# Patient Record
Sex: MALE | Race: WHITE | NOT HISPANIC OR LATINO | Employment: OTHER | URBAN - METROPOLITAN AREA
[De-identification: names, ages, dates, MRNs, and addresses within clinical notes are randomized per-mention and may not be internally consistent; named-entity substitution may affect disease eponyms.]

---

## 2017-03-10 DIAGNOSIS — M25.562 PAIN IN BOTH KNEES, UNSPECIFIED CHRONICITY: Primary | ICD-10-CM

## 2017-03-10 DIAGNOSIS — M25.561 PAIN IN BOTH KNEES, UNSPECIFIED CHRONICITY: Primary | ICD-10-CM

## 2017-03-13 ENCOUNTER — OFFICE VISIT (OUTPATIENT)
Dept: ORTHOPEDICS | Facility: CLINIC | Age: 57
End: 2017-03-13
Payer: COMMERCIAL

## 2017-03-13 VITALS
DIASTOLIC BLOOD PRESSURE: 75 MMHG | HEART RATE: 78 BPM | SYSTOLIC BLOOD PRESSURE: 138 MMHG | BODY MASS INDEX: 38.51 KG/M2 | HEIGHT: 69 IN | WEIGHT: 260 LBS

## 2017-03-13 DIAGNOSIS — M22.2X1 PATELLOFEMORAL STRESS SYNDROME OF BOTH KNEES: Primary | ICD-10-CM

## 2017-03-13 DIAGNOSIS — M22.2X2 PATELLOFEMORAL STRESS SYNDROME OF BOTH KNEES: Primary | ICD-10-CM

## 2017-03-13 PROCEDURE — 1160F RVW MEDS BY RX/DR IN RCRD: CPT | Mod: S$GLB,,, | Performed by: ORTHOPAEDIC SURGERY

## 2017-03-13 PROCEDURE — 99213 OFFICE O/P EST LOW 20 MIN: CPT | Mod: S$GLB,,, | Performed by: ORTHOPAEDIC SURGERY

## 2017-03-13 NOTE — PROGRESS NOTES
Past Medical History:   Diagnosis Date    Arthritis     Gout        Past Surgical History:   Procedure Laterality Date    SHOULDER ARTHROSCOPY         Current Outpatient Prescriptions   Medication Sig    ibuprofen (ADVIL,MOTRIN) 800 MG tablet Take 1 tablet (800 mg total) by mouth 3 (three) times daily.    meloxicam (MOBIC) 7.5 MG tablet Take 1 tablet (7.5 mg total) by mouth once daily.    meloxicam (MOBIC) 7.5 MG tablet Take 1 tablet (7.5 mg total) by mouth once daily.    oxycodone-acetaminophen (PERCOCET)  mg per tablet Take 1 tablet by mouth every 6 (six) hours.    oxycodone-acetaminophen (PERCOCET)  mg per tablet Take 1 tablet by mouth every 6 (six) hours.     No current facility-administered medications for this visit.        Review of patient's allergies indicates:   Allergen Reactions    Pcn [penicillins]        History reviewed. No pertinent family history.    Social History     Social History    Marital status:      Spouse name: N/A    Number of children: N/A    Years of education: N/A     Occupational History    Not on file.     Social History Main Topics    Smoking status: Current Every Day Smoker     Packs/day: 5.00    Smokeless tobacco: Never Used    Alcohol use No    Drug use: No    Sexual activity: Not on file     Other Topics Concern    Not on file     Social History Narrative       Chief Complaint:   Chief Complaint   Patient presents with    Right Knee - Pain    Left Knee - Pain       Consulting Physician: No ref. provider found    History of present illness: This is a 56-year-old gentleman who had a left knee scope done by us in Feb 2106.  We also completed a Euflexxa series on both knees.He reports that he feels that he had a 60-70% improvement but recently at work he has pain that gets up to an 8 out of 10 and has swelling in the knees.  He states the right knee seems to swell more than the left.    Review of Systems:    Constitution: Denies chills, fever,  and sweats.    HENT: Denies headaches or blurry vision.    Cardiovascular: Denies chest pain or irregular heart beat.    Respiratory: Denies cough or shortness of breath.    Gastrointestinal: Denies abdominal pain, nausea, or vomiting.    Musculoskeletal:  Denies muscle cramps.    Neurological: Denies dizziness or focal weakness.    Psychiatric/Behavioral: Normal mental status.    Hematologic/Lymphatic: Denies bleeding problem or easy bruising/bleeding.    Skin: Denies rash or suspicious lesions.      Examination:    Vital Signs:    Vitals:    03/13/17 1315   BP: 138/75   Pulse: 78       Body mass index is 38.4 kg/(m^2).    This a well-developed, well nourished patient in no acute distress.    Alert and oriented and cooperative to examination.       Physical Exam: Right Knee Exam    Gait   Antalgic    Skin  Rash:   None  Scars:   None    Inspection  Erythema:  None  Ecchymosis:  None  Effusion:  None  Masses:  None  Lymphadenopathy: None    Range of Motion: 0 to 130°    Medial Joint : y  Lateral Joint : n    Patellofemoral Tenderness: Yes  Patellofemoral Crepitus: Yes    Lachman:  Normal  Anterior Drawer: Normal  Posterior Drawer: Normal    Leroy's:  Negative  Apley's:  Negative    Varus Stress:  Stable  Valgus Stress:  Stable    Strength:  5/5    Pulses:  Palpable  Sensation:  Intact    Left Knee Exam    Gait   Antalgic    Skin  Rash:   None  Scars:   None    Inspection  Erythema:  None  Ecchymosis:  None  Effusion:  None  Masses:  None  Lymphadenopathy: None    Range of Motion: 0 to 130°    Medial Joint : y  Lateral Joint : n    Patellofemoral Tenderness: Yes  Patellofemoral Crepitus: Yes    Lachman:  Normal  Anterior Drawer: Normal  Posterior Drawer: Normal    Leroy's:  Negative  Apley's:  Negative    Varus Stress:  Stable  Valgus Stress:  Stable    Strength:  5/5    Pulses:  Palpable  Sensation:  Intact          Imaging: X-rays ordered and reviewed today of both  knees show some degenerative changes of the medial compartment but mostly moderate to severe changes in the patellofemoral compartment.    Assessment: Patellofemoral stress syndrome of both knees        Plan:  The pain is mostly in the lateral thigh near the superior portion of the patella consistent with patellofemoral syndrome.  We discussed treatment options and suggested that he do some physical therapy.  We will also go ahead and order Euflexxa injections and series helped him last time.    DISCLAIMER: This note may have been dictated using voice recognition software and may contain grammatical errors.     NOTE: Consult report sent to referring provider via [email or web address] EMR.

## 2017-04-03 ENCOUNTER — OFFICE VISIT (OUTPATIENT)
Dept: ORTHOPEDICS | Facility: CLINIC | Age: 57
End: 2017-04-03
Payer: COMMERCIAL

## 2017-04-03 VITALS — BODY MASS INDEX: 38.51 KG/M2 | WEIGHT: 260 LBS | HEIGHT: 69 IN

## 2017-04-03 DIAGNOSIS — M17.11 ARTHRITIS OF KNEE, RIGHT: ICD-10-CM

## 2017-04-03 DIAGNOSIS — M17.12 ARTHRITIS OF KNEE, LEFT: Primary | ICD-10-CM

## 2017-04-03 PROCEDURE — 99499 UNLISTED E&M SERVICE: CPT | Mod: S$GLB,,, | Performed by: ORTHOPAEDIC SURGERY

## 2017-04-03 PROCEDURE — 20610 DRAIN/INJ JOINT/BURSA W/O US: CPT | Mod: 50,S$GLB,, | Performed by: ORTHOPAEDIC SURGERY

## 2017-04-03 PROCEDURE — 1160F RVW MEDS BY RX/DR IN RCRD: CPT | Mod: S$GLB,,, | Performed by: ORTHOPAEDIC SURGERY

## 2017-04-03 RX ORDER — HYALURONATE SODIUM 20 MG/2 ML
20 SYRINGE (ML) INTRAARTICULAR
Status: DISCONTINUED | OUTPATIENT
Start: 2017-04-03 | End: 2017-04-03 | Stop reason: HOSPADM

## 2017-04-03 RX ADMIN — Medication 20 MG: at 04:04

## 2017-04-03 NOTE — PROGRESS NOTES
Past Medical History:   Diagnosis Date    Arthritis     Gout        Past Surgical History:   Procedure Laterality Date    SHOULDER ARTHROSCOPY         Current Outpatient Prescriptions   Medication Sig    ibuprofen (ADVIL,MOTRIN) 800 MG tablet Take 1 tablet (800 mg total) by mouth 3 (three) times daily.    meloxicam (MOBIC) 7.5 MG tablet Take 1 tablet (7.5 mg total) by mouth once daily.    meloxicam (MOBIC) 7.5 MG tablet Take 1 tablet (7.5 mg total) by mouth once daily.    oxycodone-acetaminophen (PERCOCET)  mg per tablet Take 1 tablet by mouth every 6 (six) hours.    oxycodone-acetaminophen (PERCOCET)  mg per tablet Take 1 tablet by mouth every 6 (six) hours.     No current facility-administered medications for this visit.        Review of patient's allergies indicates:   Allergen Reactions    Pcn [penicillins]        History reviewed. No pertinent family history.    Social History     Social History    Marital status:      Spouse name: N/A    Number of children: N/A    Years of education: N/A     Occupational History    Not on file.     Social History Main Topics    Smoking status: Current Every Day Smoker     Packs/day: 5.00    Smokeless tobacco: Never Used    Alcohol use No    Drug use: No    Sexual activity: Not on file     Other Topics Concern    Not on file     Social History Narrative       Chief Complaint:   Chief Complaint   Patient presents with    Injections     euflexxa 1 of 3 bilat knee       Consulting Physician: Rod Drake MD    History of present illness: This is a 56-year-old gentleman who had a left knee scope done by us in Feb 2106.  He puts his pain at work at a 5 out of 10.  States the right knee is worse.    Review of Systems:    Constitution: Denies chills, fever, and sweats.    HENT: Denies headaches or blurry vision.    Cardiovascular: Denies chest pain or irregular heart beat.    Respiratory: Denies cough or shortness of  breath.    Gastrointestinal: Denies abdominal pain, nausea, or vomiting.    Musculoskeletal:  Denies muscle cramps.    Neurological: Denies dizziness or focal weakness.    Psychiatric/Behavioral: Normal mental status.    Hematologic/Lymphatic: Denies bleeding problem or easy bruising/bleeding.    Skin: Denies rash or suspicious lesions.      Examination:    Vital Signs:    There were no vitals filed for this visit.    Body mass index is 38.4 kg/(m^2).    This a well-developed, well nourished patient in no acute distress.    Alert and oriented and cooperative to examination.       Physical Exam: Right Knee Exam    Gait   Antalgic    Skin  Rash:   None  Scars:   None    Inspection  Erythema:  None  Ecchymosis:  None  Effusion:  None  Masses:  None  Lymphadenopathy: None    Range of Motion: 0 to 130°    Medial Joint : y  Lateral Joint : n    Patellofemoral Tenderness: Yes  Patellofemoral Crepitus: Yes    Lachman:  Normal  Anterior Drawer: Normal  Posterior Drawer: Normal    Leroy's:  Negative  Apley's:  Negative    Varus Stress:  Stable  Valgus Stress:  Stable    Strength:  5/5    Pulses:  Palpable  Sensation:  Intact    Left Knee Exam    Gait   Antalgic    Skin  Rash:   None  Scars:   None    Inspection  Erythema:  None  Ecchymosis:  None  Effusion:  None  Masses:  None  Lymphadenopathy: None    Range of Motion: 0 to 130°    Medial Joint : y  Lateral Joint : n    Patellofemoral Tenderness: Yes  Patellofemoral Crepitus: Yes    Lachman:  Normal  Anterior Drawer: Normal  Posterior Drawer: Normal    Leroy's:  Negative  Apley's:  Negative    Varus Stress:  Stable  Valgus Stress:  Stable    Strength:  5/5    Pulses:  Palpable  Sensation:  Intact          Imaging:     Assessment: Arthritis of knee, left  -     Large Joint Aspiration/Injection    Arthritis of knee, right  -     Large Joint Aspiration/Injection        Plan:  The pain is mostly in the lateral thigh near the  superior portion of the patella consistent with patellofemoral syndrome.  We discussed treatment options and suggested that he do some physical therapy.  We will also go ahead and do Euflexxa injections    DISCLAIMER: This note may have been dictated using voice recognition software and may contain grammatical errors.     NOTE: Consult report sent to referring provider via [employer] EMR.

## 2017-04-03 NOTE — PROCEDURES
Large Joint Aspiration/Injection  Date/Time: 4/3/2017 4:08 PM  Performed by: VIKTOR MORGAN  Authorized by: VIKTOR MORGAN     Consent Done?:  Yes (Verbal)  Indications:  Pain    Location:  Knee  Site:  R knee and L knee  Prep: Patient was prepped and draped in usual sterile fashion    Approach:  Anteromedial  Medications:  20 mg EUFLEXXA 10 mg/mL; 20 mg EUFLEXXA 10 mg/mL

## 2017-04-10 ENCOUNTER — OFFICE VISIT (OUTPATIENT)
Dept: ORTHOPEDICS | Facility: CLINIC | Age: 57
End: 2017-04-10
Payer: COMMERCIAL

## 2017-04-10 VITALS — HEIGHT: 69 IN | WEIGHT: 260 LBS | BODY MASS INDEX: 38.51 KG/M2

## 2017-04-10 DIAGNOSIS — M17.12 ARTHRITIS OF KNEE, LEFT: Primary | ICD-10-CM

## 2017-04-10 DIAGNOSIS — M17.11 ARTHRITIS OF KNEE, RIGHT: ICD-10-CM

## 2017-04-10 PROCEDURE — 99499 UNLISTED E&M SERVICE: CPT | Mod: S$GLB,,, | Performed by: ORTHOPAEDIC SURGERY

## 2017-04-10 PROCEDURE — 20610 DRAIN/INJ JOINT/BURSA W/O US: CPT | Mod: 50,S$GLB,, | Performed by: ORTHOPAEDIC SURGERY

## 2017-04-10 RX ORDER — HYALURONATE SODIUM 20 MG/2 ML
20 SYRINGE (ML) INTRAARTICULAR
Status: DISCONTINUED | OUTPATIENT
Start: 2017-04-10 | End: 2017-04-10 | Stop reason: HOSPADM

## 2017-04-10 RX ADMIN — Medication 20 MG: at 03:04

## 2017-04-10 NOTE — PROGRESS NOTES
Past Medical History:   Diagnosis Date    Arthritis     Gout        Past Surgical History:   Procedure Laterality Date    SHOULDER ARTHROSCOPY         Current Outpatient Prescriptions   Medication Sig    ibuprofen (ADVIL,MOTRIN) 800 MG tablet Take 1 tablet (800 mg total) by mouth 3 (three) times daily.    meloxicam (MOBIC) 7.5 MG tablet Take 1 tablet (7.5 mg total) by mouth once daily.    meloxicam (MOBIC) 7.5 MG tablet Take 1 tablet (7.5 mg total) by mouth once daily.    oxycodone-acetaminophen (PERCOCET)  mg per tablet Take 1 tablet by mouth every 6 (six) hours.    oxycodone-acetaminophen (PERCOCET)  mg per tablet Take 1 tablet by mouth every 6 (six) hours.     No current facility-administered medications for this visit.        Review of patient's allergies indicates:   Allergen Reactions    Pcn [penicillins]        History reviewed. No pertinent family history.    Social History     Social History    Marital status:      Spouse name: N/A    Number of children: N/A    Years of education: N/A     Occupational History    Not on file.     Social History Main Topics    Smoking status: Current Every Day Smoker     Packs/day: 5.00    Smokeless tobacco: Never Used    Alcohol use No    Drug use: No    Sexual activity: Not on file     Other Topics Concern    Not on file     Social History Narrative       Chief Complaint:   Chief Complaint   Patient presents with    Injections     Bilateral Knee Euflexxa #2       Consulting Physician: No ref. provider found    History of present illness: This is a 56-year-old gentleman who had a left knee scope done by us in Feb 2106.  He puts his pain at work at a 2 out of 10 today and feels injections are helping.    Review of Systems:    Constitution: Denies chills, fever, and sweats.    HENT: Denies headaches or blurry vision.    Cardiovascular: Denies chest pain or irregular heart beat.    Respiratory: Denies cough or shortness of  breath.    Gastrointestinal: Denies abdominal pain, nausea, or vomiting.    Musculoskeletal:  Denies muscle cramps.    Neurological: Denies dizziness or focal weakness.    Psychiatric/Behavioral: Normal mental status.    Hematologic/Lymphatic: Denies bleeding problem or easy bruising/bleeding.    Skin: Denies rash or suspicious lesions.      Examination:    Vital Signs:    There were no vitals filed for this visit.    Body mass index is 38.4 kg/(m^2).    This a well-developed, well nourished patient in no acute distress.    Alert and oriented and cooperative to examination.       Physical Exam: Right Knee Exam    Gait   Antalgic    Skin  Rash:   None  Scars:   None    Inspection  Erythema:  None  Ecchymosis:  None  Effusion:  None  Masses:  None  Lymphadenopathy: None    Range of Motion: 0 to 130°    Medial Joint : y  Lateral Joint : n    Patellofemoral Tenderness: Yes  Patellofemoral Crepitus: Yes    Lachman:  Normal  Anterior Drawer: Normal  Posterior Drawer: Normal    Leroy's:  Negative  Apley's:  Negative    Varus Stress:  Stable  Valgus Stress:  Stable    Strength:  5/5    Pulses:  Palpable  Sensation:  Intact    Left Knee Exam    Gait   Antalgic    Skin  Rash:   None  Scars:   None    Inspection  Erythema:  None  Ecchymosis:  None  Effusion:  None  Masses:  None  Lymphadenopathy: None    Range of Motion: 0 to 130°    Medial Joint : y  Lateral Joint : n    Patellofemoral Tenderness: Yes  Patellofemoral Crepitus: Yes    Lachman:  Normal  Anterior Drawer: Normal  Posterior Drawer: Normal    Leroy's:  Negative  Apley's:  Negative    Varus Stress:  Stable  Valgus Stress:  Stable    Strength:  5/5    Pulses:  Palpable  Sensation:  Intact          Imaging:     Assessment: Arthritis of knee, left  -     Large Joint Aspiration/Injection    Arthritis of knee, right  -     Large Joint Aspiration/Injection        Plan: Euflexxa injections    DISCLAIMER: This note may have  been dictated using voice recognition software and may contain grammatical errors.     NOTE: Consult report sent to referring provider via [employer] EMR.

## 2017-04-10 NOTE — PROCEDURES
Large Joint Aspiration/Injection  Date/Time: 4/10/2017 3:59 PM  Performed by: VIKTOR MORAGN  Authorized by: VIKTOR MORGAN     Consent Done?:  Yes (Verbal)  Indications:  Pain  Timeout: Prior to procedure the correct patient, procedure, and site was verified      Location:  Knee  Site:  R knee and L knee  Prep: Patient was prepped and draped in usual sterile fashion    Approach:  Anterolateral  Medications:  20 mg EUFLEXXA 10 mg/mL; 20 mg EUFLEXXA 10 mg/mL

## 2017-04-17 ENCOUNTER — OFFICE VISIT (OUTPATIENT)
Dept: ORTHOPEDICS | Facility: CLINIC | Age: 57
End: 2017-04-17
Payer: COMMERCIAL

## 2017-04-17 VITALS — HEIGHT: 69 IN | WEIGHT: 260 LBS | BODY MASS INDEX: 38.51 KG/M2

## 2017-04-17 DIAGNOSIS — M17.11 ARTHRITIS OF KNEE, RIGHT: ICD-10-CM

## 2017-04-17 DIAGNOSIS — M17.12 ARTHRITIS OF KNEE, LEFT: Primary | ICD-10-CM

## 2017-04-17 PROCEDURE — 20610 DRAIN/INJ JOINT/BURSA W/O US: CPT | Mod: 50,S$GLB,, | Performed by: ORTHOPAEDIC SURGERY

## 2017-04-17 PROCEDURE — 99499 UNLISTED E&M SERVICE: CPT | Mod: S$GLB,,, | Performed by: ORTHOPAEDIC SURGERY

## 2017-04-17 RX ORDER — HYALURONATE SODIUM 20 MG/2 ML
20 SYRINGE (ML) INTRAARTICULAR
Status: DISCONTINUED | OUTPATIENT
Start: 2017-04-17 | End: 2017-04-17 | Stop reason: HOSPADM

## 2017-04-17 RX ADMIN — Medication 20 MG: at 03:04

## 2017-04-17 NOTE — PROGRESS NOTES
Past Medical History:   Diagnosis Date    Arthritis     Gout        Past Surgical History:   Procedure Laterality Date    SHOULDER ARTHROSCOPY         Current Outpatient Prescriptions   Medication Sig    ibuprofen (ADVIL,MOTRIN) 800 MG tablet Take 1 tablet (800 mg total) by mouth 3 (three) times daily.    meloxicam (MOBIC) 7.5 MG tablet Take 1 tablet (7.5 mg total) by mouth once daily.    meloxicam (MOBIC) 7.5 MG tablet Take 1 tablet (7.5 mg total) by mouth once daily.    oxycodone-acetaminophen (PERCOCET)  mg per tablet Take 1 tablet by mouth every 6 (six) hours.    oxycodone-acetaminophen (PERCOCET)  mg per tablet Take 1 tablet by mouth every 6 (six) hours.     No current facility-administered medications for this visit.        Review of patient's allergies indicates:   Allergen Reactions    Pcn [penicillins]        History reviewed. No pertinent family history.    Social History     Social History    Marital status:      Spouse name: N/A    Number of children: N/A    Years of education: N/A     Occupational History    Not on file.     Social History Main Topics    Smoking status: Current Every Day Smoker     Packs/day: 5.00    Smokeless tobacco: Never Used    Alcohol use No    Drug use: No    Sexual activity: Not on file     Other Topics Concern    Not on file     Social History Narrative       Chief Complaint:   Chief Complaint   Patient presents with    Injections     Bilat knee Euflexxa #3       Consulting Physician: No ref. provider found    History of present illness: This is a 56-year-old gentleman who had a left knee scope done by us in Feb 2106.  He puts his pain at work at a 2 out of 10 today and feels injections are helping.    Review of Systems:    Constitution: Denies chills, fever, and sweats.    HENT: Denies headaches or blurry vision.    Cardiovascular: Denies chest pain or irregular heart beat.    Respiratory: Denies cough or shortness of  breath.    Gastrointestinal: Denies abdominal pain, nausea, or vomiting.    Musculoskeletal:  Denies muscle cramps.    Neurological: Denies dizziness or focal weakness.    Psychiatric/Behavioral: Normal mental status.    Hematologic/Lymphatic: Denies bleeding problem or easy bruising/bleeding.    Skin: Denies rash or suspicious lesions.      Examination:    Vital Signs:    There were no vitals filed for this visit.    Body mass index is 38.4 kg/(m^2).    This a well-developed, well nourished patient in no acute distress.    Alert and oriented and cooperative to examination.       Physical Exam: Right Knee Exam    Gait   Antalgic    Skin  Rash:   None  Scars:   None    Inspection  Erythema:  None  Ecchymosis:  None  Effusion:  None  Masses:  None  Lymphadenopathy: None    Range of Motion: 0 to 130°    Medial Joint : y  Lateral Joint : n    Patellofemoral Tenderness: Yes  Patellofemoral Crepitus: Yes    Lachman:  Normal  Anterior Drawer: Normal  Posterior Drawer: Normal    Leroy's:  Negative  Apley's:  Negative    Varus Stress:  Stable  Valgus Stress:  Stable    Strength:  5/5    Pulses:  Palpable  Sensation:  Intact    Left Knee Exam    Gait   Antalgic    Skin  Rash:   None  Scars:   None    Inspection  Erythema:  None  Ecchymosis:  None  Effusion:  None  Masses:  None  Lymphadenopathy: None    Range of Motion: 0 to 130°    Medial Joint : y  Lateral Joint : n    Patellofemoral Tenderness: Yes  Patellofemoral Crepitus: Yes    Lachman:  Normal  Anterior Drawer: Normal  Posterior Drawer: Normal    Leroy's:  Negative  Apley's:  Negative    Varus Stress:  Stable  Valgus Stress:  Stable    Strength:  5/5    Pulses:  Palpable  Sensation:  Intact          Imaging:     Assessment: Arthritis of knee, left  -     Large Joint Aspiration/Injection    Arthritis of knee, right  -     Large Joint Aspiration/Injection        Plan: Euflexxa injections    DISCLAIMER: This note may have  been dictated using voice recognition software and may contain grammatical errors.     NOTE: Consult report sent to referring provider via [employer] EMR.

## 2017-04-17 NOTE — PROCEDURES
Large Joint Aspiration/Injection  Date/Time: 4/17/2017 3:31 PM  Performed by: VIKTOR MORGAN  Authorized by: VIKTOR MORGAN     Consent Done?:  Yes (Verbal)  Indications:  Pain  Timeout: Prior to procedure the correct patient, procedure, and site was verified      Location:  Knee  Site:  R knee and L knee  Prep: Patient was prepped and draped in usual sterile fashion    Approach:  Anteromedial  Medications:  20 mg EUFLEXXA 10 mg/mL(mw 2.4 -3.6 million); 20 mg EUFLEXXA 10 mg/mL(mw 2.4 -3.6 million)

## 2018-10-15 ENCOUNTER — TELEPHONE (OUTPATIENT)
Dept: PODIATRY | Facility: CLINIC | Age: 58
End: 2018-10-15

## 2018-10-15 NOTE — TELEPHONE ENCOUNTER
----- Message from Racquel Lawrence sent at 10/15/2018 11:00 AM CDT -----  Contact: Patient's wife, Della  Type:  Same Day Appointment Request    Caller is requesting a same day appointment.  Caller declined first available appointment listed below.      Name of Caller:  Patient's wife  When is the first available appointment?  10/19  Symptoms:  Patient has a sore on his left leg  Best Call Back Number:    Additional Information:

## 2018-10-16 ENCOUNTER — TELEPHONE (OUTPATIENT)
Dept: PODIATRY | Facility: CLINIC | Age: 58
End: 2018-10-16

## 2018-10-16 NOTE — TELEPHONE ENCOUNTER
----- Message from Kat Ireland sent at 10/16/2018  2:45 PM CDT -----  Contact: self  Patient [phone] is returning call from today about changing appt/please call

## 2018-10-17 ENCOUNTER — OFFICE VISIT (OUTPATIENT)
Dept: PODIATRY | Facility: CLINIC | Age: 58
End: 2018-10-17
Payer: COMMERCIAL

## 2018-10-17 VITALS — DIASTOLIC BLOOD PRESSURE: 72 MMHG | HEART RATE: 77 BPM | SYSTOLIC BLOOD PRESSURE: 126 MMHG

## 2018-10-17 DIAGNOSIS — I83.023 VENOUS STASIS ULCER OF LEFT ANKLE WITH VARICOSE VEINS, UNSPECIFIED ULCER STAGE: Primary | ICD-10-CM

## 2018-10-17 DIAGNOSIS — L97.329 VENOUS STASIS ULCER OF LEFT ANKLE WITH VARICOSE VEINS, UNSPECIFIED ULCER STAGE: Primary | ICD-10-CM

## 2018-10-17 PROCEDURE — 99999 PR PBB SHADOW E&M-EST. PATIENT-LVL III: CPT | Mod: PBBFAC,,, | Performed by: FAMILY MEDICINE

## 2018-10-17 PROCEDURE — 87186 SC STD MICRODIL/AGAR DIL: CPT | Mod: 59

## 2018-10-17 PROCEDURE — 99214 OFFICE O/P EST MOD 30 MIN: CPT | Mod: S$GLB,,, | Performed by: FAMILY MEDICINE

## 2018-10-17 PROCEDURE — 87077 CULTURE AEROBIC IDENTIFY: CPT

## 2018-10-17 PROCEDURE — 87070 CULTURE OTHR SPECIMN AEROBIC: CPT

## 2018-10-17 RX ORDER — SIMVASTATIN 20 MG/1
TABLET, FILM COATED ORAL
COMMUNITY
Start: 2018-09-25 | End: 2022-04-11

## 2018-10-17 RX ORDER — SAXAGLIPTIN AND METFORMIN HYDROCHLORIDE 5; 500 MG/1; MG/1
TABLET, FILM COATED, EXTENDED RELEASE ORAL
COMMUNITY
Start: 2018-09-27 | End: 2022-02-23

## 2018-10-17 RX ORDER — TRIAMCINOLONE ACETONIDE 1 MG/G
OINTMENT TOPICAL
COMMUNITY
Start: 2018-09-17 | End: 2022-02-23

## 2018-10-17 RX ORDER — LINEZOLID 600 MG/1
TABLET, FILM COATED ORAL
COMMUNITY
End: 2018-10-17 | Stop reason: ALTCHOICE

## 2018-10-17 NOTE — PROGRESS NOTES
Subjective:       Patient ID: Earle Hoff is a 58 y.o. male.    Chief Complaint: Wound Care; Follow-up; and Foot Ulcer    HPI   Mr. Hoff presents for wound evaluation. He is new to me but not the clinic. Reports a long histoyr of swelling in both legs, currently being managed with compression stockings. Has seen wound care at [facility] in the past but reports he did not experience significant improvemnet during that time. Reports that they evaluated him with CTA, venous dopplers, and arterial dopplers, and that he was found to have normal blood flow. Sees Dr. Ray for his diabetes and reports this is well-controlled that his last a1c was between 6 and 6.5.    Current complaints are itching of both legs, a sore over his left medial ankle that has been there for > 6 months and won't heal (started after having a biopsy there), and shocks on the inside of his left leg from ankle to knee. Has been putting vasoline over the sore and covering with a bandaid. Wears compression stockings while at work. For the itching in his legs, he's been doing bleach soaks in the tub ~2x/week. Otherwise is using soaps and detergents for sensitive skin.    Review of Systems   Constitutional: Negative for diaphoresis, fatigue, fever and unexpected weight change.   Skin: Positive for rash and wound. Negative for color change and pallor.        itching       Past Medical History:   Diagnosis Date    Arthritis     Gout      Past Surgical History:   Procedure Laterality Date    SHOULDER ARTHROSCOPY       Social History     Socioeconomic History    Marital status:      Spouse name: Not on file    Number of children: Not on file    Years of education: Not on file    Highest education level: Not on file   Social Needs    Financial resource strain: Not on file    Food insecurity - worry: Not on file    Food insecurity - inability: Not on file    Transportation needs - medical: Not on file    Transportation needs -  non-medical: Not on file   Occupational History    Not on file   Tobacco Use    Smoking status: Current Every Day Smoker     Packs/day: 5.00    Smokeless tobacco: Never Used   Substance and Sexual Activity    Alcohol use: No    Drug use: No    Sexual activity: Not on file   Other Topics Concern    Not on file   Social History Narrative    Not on file     History reviewed. No pertinent family history.    Objective:      /72   Pulse 77   Physical Exam   Constitutional: He is oriented to person, place, and time. He appears well-developed and well-nourished. No distress.   obese   Neurological: He is alert and oriented to person, place, and time.   Skin: Skin is warm and dry. Rash (maculopapular excoriations to bilateral shins) noted. He is not diaphoretic.   Vitals reviewed.      Assessment:       1. Venous stasis ulcer of left ankle with varicose veins, unspecified ulcer stage        Plan:       Venous stasis ulcer of left ankle with varicose veins, unspecified ulcer stage  -     Continue compression stockings  -     D/c bleach soaks for the next 2-3 weeks  -     Will obtain records from prev wound care provider and inf dz; normal blood flow per patient; a1c < 6.5 per patient  -     Aerobic culture (Specify Source); send in antibiotics if indicated            Risks, benefits, and side effects were discussed with the patient. All questions were answered to the fullest satisfaction of the patient, and pt verbalized understanding and agreement to treatment plan. Pt was to call with any new or worsening symptoms, or present to the ER.

## 2018-10-22 ENCOUNTER — TELEPHONE (OUTPATIENT)
Dept: FAMILY MEDICINE | Facility: CLINIC | Age: 58
End: 2018-10-22

## 2018-10-22 DIAGNOSIS — I87.2 VENOUS STASIS ULCER WITHOUT VARICOSE VEINS, UNSPECIFIED SITE, UNSPECIFIED ULCER STAGE: Primary | ICD-10-CM

## 2018-10-22 DIAGNOSIS — L97.909 VENOUS STASIS ULCER WITHOUT VARICOSE VEINS, UNSPECIFIED SITE, UNSPECIFIED ULCER STAGE: Primary | ICD-10-CM

## 2018-10-22 LAB
BACTERIA SPEC AEROBE CULT: NORMAL
BACTERIA SPEC AEROBE CULT: NORMAL

## 2018-10-22 RX ORDER — DOXYCYCLINE HYCLATE 100 MG
100 TABLET ORAL 2 TIMES DAILY
Qty: 28 TABLET | Refills: 0 | Status: SHIPPED | OUTPATIENT
Start: 2018-10-22 | End: 2018-11-05

## 2018-10-22 NOTE — TELEPHONE ENCOUNTER
Duplicate, see other message.    ----- Message from Mary Gibson sent at 10/22/2018  3:14 PM CDT -----  Contact: self  Type:  Patient Returning Call    Who Called:  self  Who Left Message for Patient:  Not sure  Does the patient know what this is regarding?:  yes  Best Call Back Number:  [phone]  Additional Information:  Patient calling back for results. Thanks!

## 2018-11-02 ENCOUNTER — OFFICE VISIT (OUTPATIENT)
Dept: PODIATRY | Facility: CLINIC | Age: 58
End: 2018-11-02
Payer: COMMERCIAL

## 2018-11-02 VITALS
HEIGHT: 69 IN | BODY MASS INDEX: 43.69 KG/M2 | TEMPERATURE: 97 F | HEART RATE: 72 BPM | WEIGHT: 295 LBS | SYSTOLIC BLOOD PRESSURE: 118 MMHG | DIASTOLIC BLOOD PRESSURE: 66 MMHG

## 2018-11-02 DIAGNOSIS — L97.329 VENOUS STASIS ULCER OF LEFT ANKLE WITH VARICOSE VEINS, UNSPECIFIED ULCER STAGE: Primary | ICD-10-CM

## 2018-11-02 DIAGNOSIS — I83.023 VENOUS STASIS ULCER OF LEFT ANKLE WITH VARICOSE VEINS, UNSPECIFIED ULCER STAGE: Primary | ICD-10-CM

## 2018-11-02 DIAGNOSIS — G57.92 NEURITIS OF LEFT FOOT: ICD-10-CM

## 2018-11-02 DIAGNOSIS — G62.9 NEUROPATHY: ICD-10-CM

## 2018-11-02 PROCEDURE — 99214 OFFICE O/P EST MOD 30 MIN: CPT | Mod: S$GLB,,, | Performed by: PODIATRIST

## 2018-11-02 PROCEDURE — 3008F BODY MASS INDEX DOCD: CPT | Mod: CPTII,S$GLB,, | Performed by: PODIATRIST

## 2018-11-02 PROCEDURE — 99999 PR PBB SHADOW E&M-EST. PATIENT-LVL III: CPT | Mod: PBBFAC,,, | Performed by: PODIATRIST

## 2018-11-02 RX ORDER — CLINDAMYCIN HYDROCHLORIDE 150 MG/1
300 CAPSULE ORAL 3 TIMES DAILY
Qty: 60 CAPSULE | Refills: 0 | Status: SHIPPED | OUTPATIENT
Start: 2018-11-02 | End: 2018-11-12

## 2018-11-02 RX ORDER — DOXYCYCLINE 100 MG/1
CAPSULE ORAL
COMMUNITY
Start: 2018-10-22 | End: 2018-11-09

## 2018-11-02 RX ORDER — PREGABALIN 75 MG/1
75 CAPSULE ORAL 2 TIMES DAILY
Qty: 60 CAPSULE | Refills: 2 | Status: SHIPPED | OUTPATIENT
Start: 2018-11-02 | End: 2018-12-18 | Stop reason: SDUPTHER

## 2018-11-02 RX ORDER — OXYCODONE AND ACETAMINOPHEN 10; 325 MG/1; MG/1
1 TABLET ORAL EVERY 6 HOURS PRN
Qty: 20 TABLET | Refills: 0 | Status: SHIPPED | OUTPATIENT
Start: 2018-11-02 | End: 2018-11-09 | Stop reason: SDUPTHER

## 2018-11-02 RX ORDER — GABAPENTIN 300 MG/1
CAPSULE ORAL
COMMUNITY
Start: 2018-10-30 | End: 2018-12-18

## 2018-11-02 RX ORDER — FUROSEMIDE 20 MG/1
TABLET ORAL
Status: ON HOLD | COMMUNITY
Start: 2018-10-30 | End: 2022-04-27 | Stop reason: SDUPTHER

## 2018-11-02 RX ORDER — HYDROXYZINE HYDROCHLORIDE 25 MG/1
TABLET, FILM COATED ORAL
COMMUNITY
Start: 2018-10-30

## 2018-11-02 RX ORDER — CIPROFLOXACIN 500 MG/1
500 TABLET ORAL 2 TIMES DAILY
Qty: 20 TABLET | Refills: 0 | Status: SHIPPED | OUTPATIENT
Start: 2018-11-02 | End: 2018-11-12

## 2018-11-09 ENCOUNTER — OFFICE VISIT (OUTPATIENT)
Dept: PODIATRY | Facility: CLINIC | Age: 58
End: 2018-11-09
Payer: COMMERCIAL

## 2018-11-09 VITALS
SYSTOLIC BLOOD PRESSURE: 125 MMHG | TEMPERATURE: 98 F | WEIGHT: 295 LBS | HEART RATE: 80 BPM | HEIGHT: 69 IN | BODY MASS INDEX: 43.69 KG/M2 | DIASTOLIC BLOOD PRESSURE: 81 MMHG

## 2018-11-09 DIAGNOSIS — I83.023 VENOUS STASIS ULCER OF LEFT ANKLE WITH VARICOSE VEINS, UNSPECIFIED ULCER STAGE: Primary | ICD-10-CM

## 2018-11-09 DIAGNOSIS — M79.2 NEUROPATHIC PAIN: ICD-10-CM

## 2018-11-09 DIAGNOSIS — I87.2 VENOUS INSUFFICIENCY OF LEFT LOWER EXTREMITY: ICD-10-CM

## 2018-11-09 DIAGNOSIS — L97.329 VENOUS STASIS ULCER OF LEFT ANKLE WITH VARICOSE VEINS, UNSPECIFIED ULCER STAGE: Primary | ICD-10-CM

## 2018-11-09 DIAGNOSIS — G57.52 TARSAL TUNNEL SYNDROME, LEFT LOWER LIMB: ICD-10-CM

## 2018-11-09 PROCEDURE — 64450 NJX AA&/STRD OTHER PN/BRANCH: CPT | Mod: LT,S$GLB,, | Performed by: PODIATRIST

## 2018-11-09 PROCEDURE — 99999 PR PBB SHADOW E&M-EST. PATIENT-LVL III: CPT | Mod: PBBFAC,,, | Performed by: PODIATRIST

## 2018-11-09 PROCEDURE — 99214 OFFICE O/P EST MOD 30 MIN: CPT | Mod: 25,S$GLB,, | Performed by: PODIATRIST

## 2018-11-09 PROCEDURE — 3008F BODY MASS INDEX DOCD: CPT | Mod: CPTII,S$GLB,, | Performed by: PODIATRIST

## 2018-11-09 RX ORDER — DEXAMETHASONE SODIUM PHOSPHATE 4 MG/ML
4 INJECTION, SOLUTION INTRA-ARTICULAR; INTRALESIONAL; INTRAMUSCULAR; INTRAVENOUS; SOFT TISSUE ONCE
Status: COMPLETED | OUTPATIENT
Start: 2018-11-09 | End: 2018-11-09

## 2018-11-09 RX ORDER — OXYCODONE AND ACETAMINOPHEN 10; 325 MG/1; MG/1
1 TABLET ORAL EVERY 6 HOURS PRN
Qty: 20 TABLET | Refills: 0 | Status: SHIPPED | OUTPATIENT
Start: 2018-11-09 | End: 2018-11-30 | Stop reason: SDUPTHER

## 2018-11-09 RX ORDER — LIDOCAINE HYDROCHLORIDE 10 MG/ML
2 INJECTION INFILTRATION; PERINEURAL
Status: COMPLETED | OUTPATIENT
Start: 2018-11-09 | End: 2018-11-09

## 2018-11-09 RX ORDER — METHYLPREDNISOLONE ACETATE 80 MG/ML
80 INJECTION, SUSPENSION INTRA-ARTICULAR; INTRALESIONAL; INTRAMUSCULAR; SOFT TISSUE ONCE
Status: COMPLETED | OUTPATIENT
Start: 2018-11-09 | End: 2018-11-09

## 2018-11-09 RX ADMIN — METHYLPREDNISOLONE ACETATE 80 MG: 80 INJECTION, SUSPENSION INTRA-ARTICULAR; INTRALESIONAL; INTRAMUSCULAR; SOFT TISSUE at 12:11

## 2018-11-09 RX ADMIN — DEXAMETHASONE SODIUM PHOSPHATE 4 MG: 4 INJECTION, SOLUTION INTRA-ARTICULAR; INTRALESIONAL; INTRAMUSCULAR; INTRAVENOUS; SOFT TISSUE at 12:11

## 2018-11-09 RX ADMIN — LIDOCAINE HYDROCHLORIDE 2 ML: 10 INJECTION INFILTRATION; PERINEURAL at 12:11

## 2018-11-10 NOTE — PROGRESS NOTES
Subjective:      Patient ID: Earle Hoff is a 58 y.o. male.    Chief Complaint: Follow-up; Foot Problem; and Ankle Pain  Patient presents via referral Dr. Valentine for Unna boot left lower extremity.  Presented to her on 10/17   with concern regarding an open wound on the left ankle region.  Culture was taken of this area and   patient was started on doxycycline.  Reports he is taking medication as directed.  States over the last   6 months or so increased pain, itching in the area, sharp shooting pain from the ankle to the foot.  I last saw the patient for this same wound about a year ago,  which started following a biopsy of the   area.  Our last visit was 11/02/2017.  At that point this area was completely healed.  Patient feels this area never completely closed, weeping some of the time with constant burning and   tingling which recently escalated.  Reports he is compliant with wearing compression hose daily.   He has been up performing bleach soaks and applying Vaseline to the area.    Works at [employer] nighttime shift, on his feet and does lot of walking. Pain level 8/10.        ROS     Constitutional    Pleasant, well-nourished, no distress, well oriented    Cardiovascular          No chest pain, no shortness of breath    Respiratory         No cough, no congestion     Musculoskeletal        YES arthralgias/joint pain, YES swelling in the extremities    Endocrine        DIABETES well controlled, no reported diabetic medication          Objective:      Physical Exam  Vascular         Arterial Pulses Right: posterior tibialis 2/4, dorsalis pedis 2/4, normal CFT   Arterial Pulses Left: posterior tibialis 2/4, dorsalis pedis 2/4, normal CFT   Mild lower extremity edema left, none right side     Integumentary   Patient has a small stasis ulcer medial left ankle,  very small superficial opening, no active bleeding          or drainage.  Surrounding skin is dark with varicosities.   Improved from previous photos  10/17   no complications right lower extremity        Neurological   Gross sensation intact, patient has acvtive firing neuritis surrounding this area due to inflammation        of wound.  This radiates from area surrounding the wound to medial aspect of the foot and occurs        regularly, about every 30 sec or so.  Also evidence of underlying neuropathy left lower extremity   No symptoms of this right lower extremity    Musculoskeletal   Muscle Strength/Testing and Tone:  Intact, normal tone bilateral   Joints, Bones, and Muscles: Limited ROM, tight, stiffness, guarding        Walks well unassisted        Culture  Component 3wk ago   Aerobic Bacterial Culture STAPHYLOCOCCUS AUREUS   Many       Aerobic Bacterial Culture ESCHERICHIA COLI   Rare       Resulting Agency [facility]   Susceptibility      Staphylococcus aureus Escherichia coli     CULTURE, AEROBIC  (SPECIFY SOURCE) CULTURE, AEROBIC  (SPECIFY SOURCE)     Amox/K Clav'ate   <=8/4  Sensitive     Amp/Sulbactam   <=8/4  Sensitive     Ampicillin   <=8  Sensitive     Cefazolin   <=8  Sensitive     Cefepime   <=8  Sensitive     Ceftriaxone   <=8  Sensitive     Ciprofloxacin   <=1  Sensitive     Clindamycin <=0.5  Sensitive       Ertapenem   <=2  Sensitive     Erythromycin <=0.5  Sensitive       Gentamicin   <=4  Sensitive     Meropenem   <=4  Sensitive     Oxacillin 0.5  Sensitive       Penicillin >8  Resistant       Piperacillin/Tazo   <=16  Sensitive     Tetracycline <=4  Sensitive <=4  Sensitive     Tobramycin   <=4  Sensitive     Trimeth/Sulfa <=0.5/9.5  Sensitive <=2/38  Sensitive              Linear View      Specimen Collected: 10/17/18 13:53 Last Resulted: 10/22/18 14:25               Assessment:       Encounter Diagnoses   Name Primary?    Venous stasis ulcer of left ankle with varicose veins, unspecified ulcer stage Yes    Neuritis of left foot     Neuropathy          Plan:       Earle was seen today for follow-up, foot problem and ankle  pain.    Diagnoses and all orders for this visit:    Venous stasis ulcer of left ankle with varicose veins, unspecified ulcer stage    Neuritis of left foot    Neuropathy    Other orders  -     clindamycin (CLEOCIN) 150 MG capsule; Take 2 capsules (300 mg total) by mouth 3 (three) times daily. for 10 days  -     ciprofloxacin HCl (CIPRO) 500 MG tablet; Take 1 tablet (500 mg total) by mouth 2 (two) times daily. for 10 days  -     pregabalin (LYRICA) 75 MG capsule; Take 1 capsule (75 mg total) by mouth 2 (two) times daily.  -     Discontinue: oxyCODONE-acetaminophen (PERCOCET)  mg per tablet; Take 1 tablet by mouth every 6 (six) hours as needed for Pain.      Reviewed culture results positive for Staph aureus and E coli.  Doxycycline covers both bacteria,   however due to patient's symptoms discontinue doxycycline and started clindamycin and Cipro.    We had a lengthy discussion regarding chronic damage to the skin, weakness in integrity of the   skin which puts him at high risk for breakdown in this same area.  Explained no bleach or soaks   of any kind should be used, no ointment directly over the wound, needs to be kept clean and dry.    Any weeping on the bandage should be changed immediately.  Advised patient unna boot would not be recommended today due to infection, we will re-evaluate   need for this in 1 week.  We discussed neuritis, evidence of neuropathy.  Recommended trying Lyrica especially since patient   feels he has had these symptoms since our lastvisit a year ago.    Dispensed prescription to start patient 75mg Lyrica twice daily.  Advised patient this can be increased   if needed.  He is to discontinue gabapentin 1 day, start Lyrica as directed the next day.  Instructed   patient to contact with any side effects.  Discussed possible cortisone injection next visit if evidence of infection resolved.  We had done this   once previously in the area the tarsal tunnel and gave patient significant  relief.  Dispensed prescription for Percocet 10 mg, 1 as needed every 6 hr for pain.  Patient has continued   to work a full-time schedule with this condition.  Instructed patient to apply either iodine or dry bandage to the area and continue compression hose,   monitor for any changes.  Counseled patient on his conditions, their implications and medical management.  Instructed patient to contact the office with any changes, questions, concerns, worsening of symptoms.   Patient verbalized understanding.   Total face to face time, exam, assessment, treatment, discussion, documentation 25 minutes, more   than half this time spent on consultation and coordination of care.   Changed appointment for Dr. Valentine next week to follow up with us next Friday in hopes of applying   unna boot.      This note was created using M*Modal voice recognition software that occasionally misinterpreted   phrases or words.

## 2018-11-16 ENCOUNTER — OFFICE VISIT (OUTPATIENT)
Dept: PODIATRY | Facility: CLINIC | Age: 58
End: 2018-11-16
Payer: COMMERCIAL

## 2018-11-16 VITALS
SYSTOLIC BLOOD PRESSURE: 115 MMHG | TEMPERATURE: 98 F | DIASTOLIC BLOOD PRESSURE: 74 MMHG | BODY MASS INDEX: 43.69 KG/M2 | HEART RATE: 73 BPM | HEIGHT: 69 IN | WEIGHT: 295 LBS

## 2018-11-16 DIAGNOSIS — L85.3 DRY SKIN DERMATITIS: ICD-10-CM

## 2018-11-16 DIAGNOSIS — L97.329 VENOUS STASIS ULCER OF LEFT ANKLE WITH VARICOSE VEINS, UNSPECIFIED ULCER STAGE: Primary | ICD-10-CM

## 2018-11-16 DIAGNOSIS — I87.2 VENOUS INSUFFICIENCY OF LEFT LOWER EXTREMITY: ICD-10-CM

## 2018-11-16 DIAGNOSIS — I83.023 VENOUS STASIS ULCER OF LEFT ANKLE WITH VARICOSE VEINS, UNSPECIFIED ULCER STAGE: Primary | ICD-10-CM

## 2018-11-16 DIAGNOSIS — M79.2 NEUROPATHIC PAIN: ICD-10-CM

## 2018-11-16 PROCEDURE — 3008F BODY MASS INDEX DOCD: CPT | Mod: CPTII,S$GLB,, | Performed by: PODIATRIST

## 2018-11-16 PROCEDURE — 99214 OFFICE O/P EST MOD 30 MIN: CPT | Mod: S$GLB,,, | Performed by: PODIATRIST

## 2018-11-16 PROCEDURE — 99999 PR PBB SHADOW E&M-EST. PATIENT-LVL III: CPT | Mod: PBBFAC,,, | Performed by: PODIATRIST

## 2018-11-19 NOTE — PROGRESS NOTES
Subjective:      Patient ID: Earle Hoff is a 58 y.o. male.    Chief Complaint: Follow-up; Diabetes Mellitus; and Ankle Pain  Patient presents  for follow up stasis ulcer left ankle and e coli and staph aureus infection.  Reports   taking clindamycin in Cipro as directed, no side effects. States area of ulcer has been dry. He was   told by his pharmacy a prior authorization/ more information was needed by our office before they   could submit prescription for Lyrica.  Therefore he has not been able to try it last week.  He reports   constant shooting pain left ankle down to the foot all day.  Reports pain medicine does help but does   not take it very often.    Pain level 8/10      ROS     Constitutional    Pleasant, well-nourished, no distress, well oriented    Cardiovascular          No chest pain, no shortness of breath    Respiratory         No cough, no congestion     Musculoskeletal        YES arthralgias/joint pain, YES swelling in the extremities    Endocrine        DIABETES well controlled, no reported diabetic medication          Objective:      Physical Exam  Vascular         Arterial Pulses Right: posterior tibialis 2/4, dorsalis pedis 2/4, normal CFT   Arterial Pulses Left: posterior tibialis 2/4, dorsalis pedis 2/4, normal CFT   Mild lower extremity edema left, none right side     Integumentary   Small stasis ulcer medial left ankle is improved, dry, no erythema or calor. Surrounding skin is         dark with varicosities.   Evidence of new petechiae, stable.    Neurological   Gross sensation intact, patient has active firing neuritis surrounding this area due to inflammation        of wound.  This radiates from area surrounding the wound to medial aspect of the foot and occurs        regularly, about every 30 sec or so.  Also evidence of underlying neuropathy left lower extremity   No symptoms of this right lower extremity    Musculoskeletal   Muscle Strength/Testing and Tone:  Intact, normal  tone bilateral   Joints, Bones, and Muscles: Limited ROM, tight, stiffness, guarding            Assessment:       Encounter Diagnoses   Name Primary?    Venous stasis ulcer of left ankle with varicose veins, unspecified ulcer stage Yes    Neuropathic pain - Left Foot     Tarsal tunnel syndrome, left lower limb     Venous insufficiency of left lower extremity          Plan:       Earle was seen today for follow-up, diabetes mellitus and ankle pain.    Diagnoses and all orders for this visit:    Venous stasis ulcer of left ankle with varicose veins, unspecified ulcer stage    Neuropathic pain - Left Foot    Tarsal tunnel syndrome, left lower limb    Venous insufficiency of left lower extremity    Other orders  -     oxyCODONE-acetaminophen (PERCOCET)  mg per tablet; Take 1 tablet by mouth every 6 (six) hours as needed for Pain.  -     dexamethasone injection 4 mg  -     methylPREDNISolone acetate injection 80 mg  -     lidocaine HCL 10 mg/ml (1%) injection 2 mL      Instructed patient finished antibiotics as directed until gone.  Reassured him area of ulceration has improved   considerably, no drainage.  We discussed rash starting in area and recommended Unna boot.  Patient was in   agreement and relates this medicated dressing has always been very effective for him. He understands it is to   be removed if it becomes too tight or painful, removing 3 days and resume compression hose.  We discussed nerve pain, neuropathy, tarsal tunnel.  Discussed possibility of damage to these nerves in this   area from this ulcer, condition of veins in this region.  Recommended cortisone injection.  We discussed   potential side effects and length of time injection may be beneficial.  Patient related he wish to pursue injection   due to high pain level, he is working a full-time job on his feet, and confirmed previous injection in this area was   very beneficial over a year ago.  Patient was administered injection tarsal  tunnel region left with above medications, patient tolerated well.  Unna boot applied left lower extremity.  Advised patient we will file pre approval for Lyrica.  He was dispensed samples today and instructed to take    1-75 mg capsule twice daily, due to on takes gabapentin with it, discontinue and contact the office immediately   if any side effects.  Refill dispensed Percocet, reviewed medication, must be taken as directed, advised patient this medication will   not be refilled on a regular basis.  Patient was in his 10 understanding and agreement with treatment plan.  Counseled patient on his conditions, their implications and medical management.  Instructed patient to contact the office with any changes, questions, concerns, worsening of symptoms.   Patient verbalized understanding.   Total face to face time, exam, assessment, treatment, discussion, documentation 25 minutes, more than half   this time spent on consultation and coordination of care.   Follow up 1 week.        This note was created using M*Modal voice recognition software that occasionally misinterpreted phrases or   words.

## 2018-11-29 ENCOUNTER — TELEPHONE (OUTPATIENT)
Dept: PODIATRY | Facility: CLINIC | Age: 58
End: 2018-11-29

## 2018-11-29 NOTE — TELEPHONE ENCOUNTER
----- Message from Mirna Moncada sent at 11/29/2018 12:36 PM CST -----  Call Samantha at [phone]  Requesting an afternoon appt today or tomorrow .. His leg is breaking out again / cannot do tomorrow at 8:15

## 2018-11-30 ENCOUNTER — OFFICE VISIT (OUTPATIENT)
Dept: PODIATRY | Facility: CLINIC | Age: 58
End: 2018-11-30
Payer: COMMERCIAL

## 2018-11-30 VITALS
TEMPERATURE: 98 F | HEIGHT: 69 IN | BODY MASS INDEX: 43.69 KG/M2 | DIASTOLIC BLOOD PRESSURE: 80 MMHG | WEIGHT: 295 LBS | OXYGEN SATURATION: 99 % | HEART RATE: 72 BPM | SYSTOLIC BLOOD PRESSURE: 135 MMHG | RESPIRATION RATE: 18 BRPM

## 2018-11-30 DIAGNOSIS — G57.52 TARSAL TUNNEL SYNDROME, LEFT LOWER LIMB: ICD-10-CM

## 2018-11-30 DIAGNOSIS — M79.2 NEUROPATHIC PAIN: Primary | ICD-10-CM

## 2018-11-30 DIAGNOSIS — R21 RASH: ICD-10-CM

## 2018-11-30 DIAGNOSIS — I87.2 VENOUS INSUFFICIENCY OF LEFT LOWER EXTREMITY: ICD-10-CM

## 2018-11-30 DIAGNOSIS — L97.329 VENOUS STASIS ULCER OF LEFT ANKLE WITH VARICOSE VEINS, UNSPECIFIED ULCER STAGE: ICD-10-CM

## 2018-11-30 DIAGNOSIS — B02.29 OTHER POSTHERPETIC NERVOUS SYSTEM INVOLVEMENT: ICD-10-CM

## 2018-11-30 DIAGNOSIS — I83.023 VENOUS STASIS ULCER OF LEFT ANKLE WITH VARICOSE VEINS, UNSPECIFIED ULCER STAGE: ICD-10-CM

## 2018-11-30 PROCEDURE — 99214 OFFICE O/P EST MOD 30 MIN: CPT | Mod: S$GLB,,, | Performed by: PODIATRIST

## 2018-11-30 PROCEDURE — 99999 PR PBB SHADOW E&M-EST. PATIENT-LVL IV: CPT | Mod: PBBFAC,,, | Performed by: PODIATRIST

## 2018-11-30 PROCEDURE — 3008F BODY MASS INDEX DOCD: CPT | Mod: CPTII,S$GLB,, | Performed by: PODIATRIST

## 2018-11-30 RX ORDER — OXYCODONE AND ACETAMINOPHEN 10; 325 MG/1; MG/1
1 TABLET ORAL EVERY 6 HOURS PRN
Qty: 20 TABLET | Refills: 0 | Status: SHIPPED | OUTPATIENT
Start: 2018-11-30 | End: 2018-12-11 | Stop reason: SDUPTHER

## 2018-11-30 RX ORDER — CIPROFLOXACIN 500 MG/1
500 TABLET ORAL 2 TIMES DAILY
Qty: 20 TABLET | Refills: 0 | Status: SHIPPED | OUTPATIENT
Start: 2018-11-30 | End: 2018-12-10

## 2018-11-30 RX ORDER — ACYCLOVIR 200 MG/1
200 CAPSULE ORAL
Qty: 50 CAPSULE | Refills: 0 | Status: SHIPPED | OUTPATIENT
Start: 2018-11-30 | End: 2019-01-02

## 2018-12-01 NOTE — PROGRESS NOTES
Subjective:      Patient ID: Earle Hoff is a 58 y.o. male.    Chief Complaint: Ankle Pain; Foot Pain; and Follow-up  Patient presents for follow up stasis ulcer left ankle and nerve pain left lower extremity.  Patient reports   cortisone injection has helped.  Also reports taking Lyrica seems to help also, tolerating well with no side   effects. He does still have continued pain in this area, not as severe, will take half a pain pill or so only if   absolutely needed.  He has continued to work full time.  Pain level 2/10 today.   Considerable improvement   from 8/10 last visit      ROS     Constitutional    Pleasant, well-nourished, no distress, well oriented    Cardiovascular          No chest pain, no shortness of breath    Respiratory         No cough, no congestion     Musculoskeletal        YES arthralgias/joint pain, YES swelling in the extremities    Endocrine        DIABETES well controlled, no reported diabetic medication          Objective:      Physical Exam  Vascular         Arterial Pulses Right: posterior tibialis 2/4, dorsalis pedis 2/4, normal CFT   Arterial Pulses Left: posterior tibialis 2/4, dorsalis pedis 2/4, normal CFT   Mild lower extremity edema left, none right side     Integumentary   Small stasis ulcer medial left ankle remains closed, dry, no erythema or calor. Surrounding skin is         dark with varicosities. Dry skin dermiitis medial ankle L>R, mild petechiae, stable.    Neurological   Gross sensation intact, positive paresthesias and nerve pain left lower extremity, improved       Musculoskeletal   Muscle Strength/Testing and Tone:  Intact, normal tone bilateral   Joints, Bones, and Muscles: Limited ROM, tight, stiffness, guarding            Assessment:       Encounter Diagnoses   Name Primary?    Venous stasis ulcer of left ankle with varicose veins, unspecified ulcer stage Yes    Venous insufficiency of left lower extremity     Neuropathic pain - Left Foot     Dry skin  dermatitis          Plan:       Earle was seen today for ankle pain, foot pain and follow-up.    Diagnoses and all orders for this visit:    Venous stasis ulcer of left ankle with varicose veins, unspecified ulcer stage    Venous insufficiency of left lower extremity    Neuropathic pain - Left Foot    Dry skin dermatitis      Continue daily compression hose.  Apply Eucrisa (alreay has at home) once daily to areas of mildly red dry skin.  Make sure it is thoroughly absorbed   into the skin and dry before applying compression hose.  Would recommend hit applied for he goes to bed when   he does not have compression hose on.  Check skin on ankle and legs daily for any changes.  Dispensed samples Lyrica until approved by insurance.  Instructed patient to continue  1-75 mg capsule twice daily.  Counseled patient on his conditions, their implications and medical management.  Instructed patient to contact the office with any changes, questions, concerns, worsening of symptoms.   Patient verbalized understanding.   Total face to face time, exam, assessment, treatment, discussion, documentation 25 minutes, more than half this   time spent on consultation and coordination of care.   Follow up 2 weeks.      This note was created using [employer] voice recognition software that occasionally misinterpreted phrases or   words.

## 2018-12-10 NOTE — PROGRESS NOTES
Subjective:      Patient ID: Earle Hoff is a 58 y.o. male.    Chief Complaint: Foot Pain (red swollen )  Patient presents with concern regarding a deep red rash on both feet and lower legs, worse on the   left side.  Relates nerve pain in left foot and ankle has increased since last visit.  Lyrica and pain   medication is helpful, but states it seems anything he/we tries is effective for only a few weeks.    He relates there is continued regular sharp shooting pains left foot and ankle.  Otherwise he feels   okay, no fever or chills.  Has continued to work full-time, too painful to wear compression stockings.  Pain level back up to 8/10. Was 2 last visit.     ROS     Constitutional    Pleasant, well-nourished, no distress, well oriented    Cardiovascular          No chest pain, no shortness of breath    Respiratory         No cough, no congestion     Musculoskeletal        YES arthralgias/joint pain, YES swelling in the extremities    Endocrine        DIABETES well controlled, no reported diabetic medication          Objective:      Physical Exam  Vascular         Arterial Pulses Right: posterior tibialis 2/4, dorsalis pedis 2/4, normal CFT   Arterial Pulses Left: posterior tibialis 2/4, dorsalis pedis 2/4, normal CFT   Mild lower extremity edema right, moderate left.     Integumentary   Small stasis ulcer medial left ankle remains closed and dry, calor.   There is a dark, deep red, well demarcated rash bilateral feet and lower legs, left greater than        right, no calor, weeping or other indication of obvious infection, possibly viral / shingles  SEE DIGITAL PHOTOS UNDER MEDIA.    Neurological   Gross sensation intact, increased cyclic radiating paresthesias and nerve pain left lower extremity       Musculoskeletal   Muscle Strength/Testing and Tone:  Intact, normal tone bilateral   Joints, Bones, and Muscles: Limited ROM, tight, stiffness, guarding            Assessment:       Encounter Diagnoses   Name  Primary?    Neuropathic pain - Left Foot Yes    Rash     Other postherpetic nervous system involvement     Tarsal tunnel syndrome, left lower limb     Venous insufficiency of left lower extremity     Venous stasis ulcer of left ankle with varicose veins, unspecified ulcer stage          Plan:       Earle was seen today for foot pain.    Diagnoses and all orders for this visit:    Neuropathic pain - Left Foot    Rash    Other postherpetic nervous system involvement    Tarsal tunnel syndrome, left lower limb    Venous insufficiency of left lower extremity    Venous stasis ulcer of left ankle with varicose veins, unspecified ulcer stage    Other orders  -     acyclovir (ZOVIRAX) 200 MG capsule; Take 1 capsule (200 mg total) by mouth every 4 (four) hours while awake. for 10 days  -     oxyCODONE-acetaminophen (PERCOCET)  mg per tablet; Take 1 tablet by mouth every 6 (six) hours as needed for Pain.  -     ciprofloxacin HCl (CIPRO) 500 MG tablet; Take 1 tablet (500 mg total) by mouth 2 (two) times daily. for 10 days          Advised patient there is no evidence of infection, would   Recommend antibiotic as a precaution.  Prescribed Cipro    Advised rash can be medication or viral related.   Discussed shingles which would increase his nerve pain. Wife tried to contact his PCP, Dr. Rachid Ray while in the treatment room there was no answer on a Friday afternoon.    Prescribed acyclovir and encouraged patient to start medication as a precaution until he can see    Dr Ray on Monday.  We discussed signs of infection to monitor for, if he has warmth, drainage,   increased pain, fever or chills he needs to go to the emergency room this weekend.   Advised patient he needs to discuss referral to neurology with Dr. Ray as well.  Patient has seen    Neurologist in the past with poor results, however explained constant radiating nerve pain lower left   leg needs to be investigated, 2nd opinion obtained since it  is above the ankle.  Advised patient   different neurologist could be very helpful.  Patient stated he would consider this.  Light unna boots were applied bilateral lower extremities, instructed patient to remove if tight or painful.  Refill dispensed for Percocet, instructed patient to take medication as directed.    Counseled patient on his conditions, their implications and medical management.  Instructed patient to contact the office with any changes, questions, concerns, worsening of symptoms.   Patient verbalized understanding.   Total face to face time, exam, assessment, treatment, discussion, documentation 25 minutes, more   than half this time spent on consultation and coordination of care.   Follow up 1 week.      This note was created using [employer] voice recognition software that occasionally misinterpreted   phrases or words.

## 2018-12-11 ENCOUNTER — OFFICE VISIT (OUTPATIENT)
Dept: PODIATRY | Facility: CLINIC | Age: 58
End: 2018-12-11
Payer: COMMERCIAL

## 2018-12-11 ENCOUNTER — TELEPHONE (OUTPATIENT)
Dept: PODIATRY | Facility: CLINIC | Age: 58
End: 2018-12-11

## 2018-12-11 VITALS
HEART RATE: 68 BPM | BODY MASS INDEX: 43.69 KG/M2 | OXYGEN SATURATION: 98 % | WEIGHT: 295 LBS | RESPIRATION RATE: 18 BRPM | HEIGHT: 69 IN | TEMPERATURE: 97 F | SYSTOLIC BLOOD PRESSURE: 123 MMHG | DIASTOLIC BLOOD PRESSURE: 75 MMHG

## 2018-12-11 DIAGNOSIS — M79.2 NEUROPATHIC PAIN: ICD-10-CM

## 2018-12-11 DIAGNOSIS — I87.2 VENOUS INSUFFICIENCY OF LEFT LOWER EXTREMITY: ICD-10-CM

## 2018-12-11 DIAGNOSIS — B02.29 OTHER POSTHERPETIC NERVOUS SYSTEM INVOLVEMENT: ICD-10-CM

## 2018-12-11 DIAGNOSIS — R21 RASH: ICD-10-CM

## 2018-12-11 DIAGNOSIS — G57.92 NEURITIS OF LEFT FOOT: ICD-10-CM

## 2018-12-11 PROCEDURE — 29580 STRAPPING UNNA BOOT: CPT | Mod: 51,LT,S$GLB, | Performed by: PODIATRIST

## 2018-12-11 PROCEDURE — 3008F BODY MASS INDEX DOCD: CPT | Mod: CPTII,S$GLB,, | Performed by: PODIATRIST

## 2018-12-11 PROCEDURE — 99214 OFFICE O/P EST MOD 30 MIN: CPT | Mod: 25,S$GLB,, | Performed by: PODIATRIST

## 2018-12-11 PROCEDURE — 96372 THER/PROPH/DIAG INJ SC/IM: CPT | Mod: 59,S$GLB,, | Performed by: PODIATRIST

## 2018-12-11 PROCEDURE — 99999 PR PBB SHADOW E&M-EST. PATIENT-LVL III: CPT | Mod: PBBFAC,,, | Performed by: PODIATRIST

## 2018-12-11 RX ORDER — BETAMETHASONE SODIUM PHOSPHATE AND BETAMETHASONE ACETATE 3; 3 MG/ML; MG/ML
18 INJECTION, SUSPENSION INTRA-ARTICULAR; INTRALESIONAL; INTRAMUSCULAR; SOFT TISSUE
Status: COMPLETED | OUTPATIENT
Start: 2018-12-11 | End: 2018-12-11

## 2018-12-11 RX ORDER — CYCLOBENZAPRINE HYDROCHLORIDE 7.5 MG/1
7.5 TABLET, FILM COATED ORAL NIGHTLY
Qty: 30 TABLET | Refills: 0 | Status: SHIPPED | OUTPATIENT
Start: 2018-12-11 | End: 2018-12-11

## 2018-12-11 RX ORDER — CYCLOBENZAPRINE HCL 10 MG
10 TABLET ORAL 3 TIMES DAILY PRN
Qty: 30 TABLET | Refills: 0 | Status: SHIPPED | OUTPATIENT
Start: 2018-12-11 | End: 2019-01-10

## 2018-12-11 RX ORDER — OXYCODONE AND ACETAMINOPHEN 10; 325 MG/1; MG/1
1 TABLET ORAL EVERY 6 HOURS PRN
Qty: 30 TABLET | Refills: 0 | Status: SHIPPED | OUTPATIENT
Start: 2018-12-11 | End: 2019-01-02 | Stop reason: SDUPTHER

## 2018-12-11 RX ADMIN — BETAMETHASONE SODIUM PHOSPHATE AND BETAMETHASONE ACETATE 18 MG: 3; 3 INJECTION, SUSPENSION INTRA-ARTICULAR; INTRALESIONAL; INTRAMUSCULAR; SOFT TISSUE at 10:12

## 2018-12-11 NOTE — TELEPHONE ENCOUNTER
----- Message from Mildred Kelley sent at 12/11/2018  4:00 PM CST -----  Contact: [facility]pharmacist  Type: Needs Medical Advice    Who Called:  [facility]-pharmacist  Pharmacy name and phone #:  [facility]  Cyclobenzaprine 7.5mg not covered by insur need to be changed to 5mg or the 10mg which is covered.  Best Call Back Number: [phone]  Additional Information: would like a call back

## 2018-12-11 NOTE — TELEPHONE ENCOUNTER
Spoke to Bud.  Advised cyclobenzaprine 7.5 mg isn't covered by insurance.  He said it 5 mg or 10 mg is covered.  Please advise.

## 2018-12-18 ENCOUNTER — OFFICE VISIT (OUTPATIENT)
Dept: PODIATRY | Facility: CLINIC | Age: 58
End: 2018-12-18
Payer: COMMERCIAL

## 2018-12-18 ENCOUNTER — HOSPITAL ENCOUNTER (OUTPATIENT)
Dept: RADIOLOGY | Facility: HOSPITAL | Age: 58
Discharge: HOME | End: 2018-12-18
Attending: PODIATRIST
Payer: COMMERCIAL

## 2018-12-18 VITALS
BODY MASS INDEX: 43.69 KG/M2 | DIASTOLIC BLOOD PRESSURE: 76 MMHG | HEIGHT: 69 IN | TEMPERATURE: 97 F | SYSTOLIC BLOOD PRESSURE: 129 MMHG | WEIGHT: 295 LBS | OXYGEN SATURATION: 98 % | HEART RATE: 70 BPM | RESPIRATION RATE: 18 BRPM

## 2018-12-18 DIAGNOSIS — M79.2 NEUROPATHIC PAIN: ICD-10-CM

## 2018-12-18 DIAGNOSIS — R21 RASH: ICD-10-CM

## 2018-12-18 DIAGNOSIS — M79.672 FOOT PAIN, LEFT: ICD-10-CM

## 2018-12-18 DIAGNOSIS — I87.2 VENOUS INSUFFICIENCY OF LEFT LOWER EXTREMITY: ICD-10-CM

## 2018-12-18 DIAGNOSIS — M72.2 PLANTAR FASCIITIS: ICD-10-CM

## 2018-12-18 DIAGNOSIS — B02.29 OTHER POSTHERPETIC NERVOUS SYSTEM INVOLVEMENT: ICD-10-CM

## 2018-12-18 DIAGNOSIS — M19.072 PRIMARY OSTEOARTHRITIS OF LEFT FOOT: ICD-10-CM

## 2018-12-18 PROCEDURE — 20550 NJX 1 TENDON SHEATH/LIGAMENT: CPT | Mod: LT,S$GLB,, | Performed by: PODIATRIST

## 2018-12-18 PROCEDURE — 99999 PR PBB SHADOW E&M-EST. PATIENT-LVL III: CPT | Mod: PBBFAC,,, | Performed by: PODIATRIST

## 2018-12-18 PROCEDURE — 3008F BODY MASS INDEX DOCD: CPT | Mod: CPTII,S$GLB,, | Performed by: PODIATRIST

## 2018-12-18 PROCEDURE — 29580 STRAPPING UNNA BOOT: CPT | Mod: 59,LT,S$GLB, | Performed by: PODIATRIST

## 2018-12-18 PROCEDURE — 73630 X-RAY EXAM OF FOOT: CPT | Mod: TC,FY,LT

## 2018-12-18 PROCEDURE — 73630 X-RAY EXAM OF FOOT: CPT | Mod: 26,LT,, | Performed by: RADIOLOGY

## 2018-12-18 PROCEDURE — 99214 OFFICE O/P EST MOD 30 MIN: CPT | Mod: 25,S$GLB,, | Performed by: PODIATRIST

## 2018-12-18 RX ORDER — PREGABALIN 75 MG/1
75 CAPSULE ORAL 3 TIMES DAILY
Qty: 90 CAPSULE | Refills: 2 | Status: SHIPPED | OUTPATIENT
Start: 2018-12-18 | End: 2022-09-06

## 2018-12-18 RX ADMIN — DEXAMETHASONE SODIUM PHOSPHATE 4 MG: 4 INJECTION, SOLUTION INTRA-ARTICULAR; INTRALESIONAL; INTRAMUSCULAR; INTRAVENOUS; SOFT TISSUE at 01:12

## 2018-12-18 RX ADMIN — METHYLPREDNISOLONE ACETATE 80 MG: 80 INJECTION, SUSPENSION INTRA-ARTICULAR; INTRALESIONAL; INTRAMUSCULAR; SOFT TISSUE at 01:12

## 2018-12-18 RX ADMIN — LIDOCAINE HYDROCHLORIDE 2 ML: 10 INJECTION INFILTRATION; PERINEURAL at 01:12

## 2018-12-19 RX ORDER — LIDOCAINE HYDROCHLORIDE 10 MG/ML
2 INJECTION INFILTRATION; PERINEURAL
Status: COMPLETED | OUTPATIENT
Start: 2018-12-19 | End: 2018-12-18

## 2018-12-19 RX ORDER — LIDOCAINE HYDROCHLORIDE 10 MG/ML
2 INJECTION INFILTRATION; PERINEURAL
Status: DISCONTINUED | OUTPATIENT
Start: 2018-12-19 | End: 2018-12-19

## 2018-12-19 RX ORDER — DEXAMETHASONE SODIUM PHOSPHATE 4 MG/ML
4 INJECTION, SOLUTION INTRA-ARTICULAR; INTRALESIONAL; INTRAMUSCULAR; INTRAVENOUS; SOFT TISSUE ONCE
Status: COMPLETED | OUTPATIENT
Start: 2018-12-19 | End: 2018-12-18

## 2018-12-19 RX ORDER — METHYLPREDNISOLONE ACETATE 80 MG/ML
80 INJECTION, SUSPENSION INTRA-ARTICULAR; INTRALESIONAL; INTRAMUSCULAR; SOFT TISSUE ONCE
Status: COMPLETED | OUTPATIENT
Start: 2018-12-19 | End: 2018-12-18

## 2018-12-19 RX ORDER — METHYLPREDNISOLONE ACETATE 80 MG/ML
80 INJECTION, SUSPENSION INTRA-ARTICULAR; INTRALESIONAL; INTRAMUSCULAR; SOFT TISSUE ONCE
Status: DISCONTINUED | OUTPATIENT
Start: 2018-12-19 | End: 2018-12-19

## 2018-12-19 RX ORDER — DEXAMETHASONE SODIUM PHOSPHATE 4 MG/ML
4 INJECTION, SOLUTION INTRA-ARTICULAR; INTRALESIONAL; INTRAMUSCULAR; INTRAVENOUS; SOFT TISSUE ONCE
Status: DISCONTINUED | OUTPATIENT
Start: 2018-12-19 | End: 2018-12-19

## 2018-12-19 NOTE — PROGRESS NOTES
Subjective:      Patient ID: Earle Hoff is a 58 y.o. male.    Chief Complaint: Follow-up  Patient presents for follow-up  Rash with blisters dorsal feet lower legs secondary to shingles.    Has a history of venous insufficiency, edema and stasis ulcer left ankle.  Relates these conditions   have been stable with no problems. Patient confirms he saw Dr. Ray who agrees with diagnosis   and wanted him to continue acyclovir as directed.   Relates Unna boot medicated dressings have   always been helpful and give him relief.  He has been taking Percocet as directed, only as needed.    Has been taking 75 mg gabapentin twice day and tolerating with no side effects.  Relates no change  in nerve pain left foot and leg.  Relates pain continues to shoot constantly, went up on his feet, active   or at work he can tolerated with the pain medication, but it is severe at night and having difficulty   sleeping.  Has appointment with Dr. Ray today at 1:30 a.m.      ROS     Constitutional    Pleasant, well-nourished, no distress, well oriented    Cardiovascular          No chest pain, no shortness of breath    Respiratory         No cough, no congestion     Musculoskeletal        YES arthralgias/joint pain, YES swelling in the extremities    Endocrine        DIABETES well controlled, no reported diabetic medication          Objective:      Physical Exam  Vascular         Arterial Pulses Right: posterior tibialis 1/4, dorsalis pedis 2/4, normal CFT   Arterial Pulses Left: posterior tibialis 1/4, dorsalis pedis 1/4, normal CFT   Moderate bilateral lower extremity edema      Integumentary          Increased edema bilateral lower legs.  Darkness of the rash has improved, but several small              superficial broken blisters noted bilateral lower extremities along with the usual dry skin and              discoloration from patient's venous insufficiency.  There is noted improvement from previous              visit, but  still very irritated.  No evidence of infection  Small stasis ulcer medial left ankle remains closed and dry, calor.     Neurological   Gross sensation intact, no change in cyclic radiating paresthesias and nerve pain left lower         Extremity.  Patient's foot jumps each time this occurs is which is roughly about every 30 sec       Musculoskeletal   Muscle Strength/Testing and Tone:  Intact, normal tone bilateral   Joints, Bones, and Muscles: Limited ROM, tight, stiffness, guarding            Assessment:       Encounter Diagnoses   Name Primary?    Neuropathic pain - Left Foot     Other postherpetic nervous system involvement     Rash     Venous insufficiency of left lower extremity     Neuritis of left foot          Plan:       Earle was seen today for follow-up.    Diagnoses and all orders for this visit:    Neuropathic pain - Left Foot    Other postherpetic nervous system involvement    Rash    Venous insufficiency of left lower extremity    Neuritis of left foot    Other orders  -     betamethasone acetate-betamethasone sodium phosphate injection 18 mg  -     crisaborole (EUCRISA) 2 % Oint; Apply 2 g topically once. for 1 dose  -     oxyCODONE-acetaminophen (PERCOCET)  mg per tablet; Take 1 tablet by mouth every 6 (six) hours as needed for Pain.  -     Discontinue: cyclobenzaprine (FEXMID) 7.5 MG Tab; Take 1 tablet (7.5 mg total) by mouth nightly.  -     cyclobenzaprine (FLEXERIL) 10 MG tablet; Take 1 tablet (10 mg total) by mouth 3 (three) times daily as needed for Muscle spasms.        Increase Lyrica 75 mg from twice a day to 3 times a day.  Advised patient we will try this for 1   week to see how he tolerates it.  recommended IM cortisone injection due to high pain level, constant firing of acute nerve   inflammation left foot, no improvement with other conservative treatments and the need for   patient to continue some level of comfort to work full time.  We discussed potential complications,    potential side effects and length of time injection may be beneficial.  Patient related he did want to  pursue injection today.  Patient was administered 3 cc betamethasone left hip  Dispensed refill for Percocet.  BMP is consistent.  Advised patient he needs to start decreasing   the amount of pain medication he takes daily to avoid dependency, hopefully increase in Lyrica   will offset his pain.  Prescribed 1 Flexeril 10 mg at night for increased neuropathic pain while at rest.  Instructed   patient do not take pain medication with this.  We discussed potential side effects.  Bilateral Unna boots lightly applied.  Instructed patient to take off in 3 days, wash legs with warm   soapy water, apply you Eri once daily.  This has been very beneficial for him in the past for   irritated skin due to his venous insufficiency.  Instructed patient to not get it into any open areas,   those regions need to be kept clean and dry.  Instructed patient no soaking or antibiotic ointment.  Advised patient with multiple superficial openings he is at high risk for infection, needs to keep his   legs clean and dry, check daily and contact the office immediately with any changes.  Advised patient she ankles has to run its course since it is viral can take 4-6 weeks, he needs to   continue to follow-up with Dr. Ray as well.  Patient was in understanding and agreement with treatment plan.  Counseled patient on his conditions, their implications and medical management.  Instructed patient to contact the office with any changes, questions, concerns, worsening of symptoms.   Patient verbalized understanding.   Total face to face time, exam, assessment, treatment, discussion, documentation 25 minutes, more   than half this time spent on consultation and coordination of care.   Follow up 1 week.      This note was created using [employer] voice recognition software that occasionally misinterpreted   phrases or words.

## 2018-12-19 NOTE — PROGRESS NOTES
"Subjective:      Patient ID: Earle Hoff is a 58 y.o. male.    Chief Complaint: Follow-up  Patient presents for follow-up rash with blisters dorsal feet lower legs secondary to shingles and   venous insufficiency with edema.  Patient reports previous area of nerve pain has "let up",  however   he is now having same pain in the left heel.  Noticed a bruise to the area, does not recall any injury,   having sharp shooting nerve pain from the heel to the arch.  States he can barely get through work.    Pain level 8/10.  Confirms he is tolerating 75 mg Lyrica 3 times a day without any side effects or   complications.   Has a few days of acyclovir left, feels the rash on his legs is improving.  He has   been using Eucrisa and massaging it in to very tight region on the inside of the left lower ankle   where he was having the nerve pain and states this has helped considerably, skin has softened,   knot isgone and feels this has been helpful regarding the constant nerve pain he was having in this   area.  Patient reports taking Percocet only as needed, tolerating Flexeril at night, only as needed.    Patient states he has come to the conclusion he is going to have chronic problems with his legs,   swelling and condition of his skin, feels extensive amount of walking and standing he does at work   at the [employer] is not helping and plans to retire from that job end of December, in a few weeks.  He is following up again today with Dr. Ray at 1:30.      ROS     Constitutional    Pleasant, well-nourished, no distress, well oriented    Cardiovascular          No chest pain, no shortness of breath    Respiratory         No cough, no congestion     Musculoskeletal        YES arthralgias/joint pain, YES swelling in the extremities    Endocrine        DIABETES well controlled, no reported diabetic medication          Objective:      Physical Exam  Vascular         Arterial Pulses Right: posterior tibialis 1/4, dorsalis pedis " "2/4, normal CFT   Arterial Pulses Left: posterior tibialis 1/4, dorsalis pedis 1/4, normal CFT   Moderate bilateral lower extremity edema      Integumentary          Dark rash with small superficial blisters dorsal feet and both anterior lower extremities                continues to improve.  There is some evidence of scratching, some new superficial                blisters.  Area where patient reports he has been" working on"  with Eucrisa has               Improved significantly.  Skin in this region is soft, good texture, light discoloration.               Majority of skin dorsal feet in the anterior lower legs is lightly erythematous, dry and                Cracking.  No areas of drainage or weeping.  No evidence of infection.  Small stasis ulcer medial left ankle remains closed and dry, calor.   Small area of ecchymosis plantar left heel, not fluctuant or body, no calor, no evidence          of abscess.    Neurological   Gross sensation intact, improved neuritis pain left lower extremity is area of tarsal tunnel,           However patient is having similar pain from the heel to the left arch.  It is severe with            palpation and can continues with out pressure to the area.  This is consistent with           Baxters neuritis     Musculoskeletal   Muscle Strength/Testing and Tone:  Intact, normal tone bilateral   Joints, Bones, and Muscles: Limited ROM, tight, stiffness, guarding  Severe plantar fascial pain left heel at the calcaneal attachment into the medial band of the        arch     Radiograph  FINDINGS:  No acute fracture or malalignment. Os peroneum, anatomic variant.  Dorsal and plantar calcaneal   spurs.  Mild midfoot degenerative changes including the navicular articulation with the cuneiforms   and the 2nd through 5th TMT joints.  Mild degenerative changes 1st MTP joint.  Moderate   degenerative change hallux sesamoid joint.  Atherosclerosis.  Soft tissue swelling dorsal foot.      "   Assessment:       Encounter Diagnoses   Name Primary?    Neuropathic pain - Left Foot     Primary osteoarthritis of left foot     Plantar fasciitis - Left Foot     Other postherpetic nervous system involvement     Rash     Venous insufficiency of left lower extremity     Foot pain, left          Plan:       Earle was seen today for follow-up.    Diagnoses and all orders for this visit:    Neuropathic pain - Left Foot    Primary osteoarthritis of left foot    Plantar fasciitis - Left Foot    Other postherpetic nervous system involvement    Rash    Venous insufficiency of left lower extremity    Foot pain, left  -     X-Ray Foot Complete Left; Future    Other orders  -     pregabalin (LYRICA) 75 MG capsule; Take 1 capsule (75 mg total) by mouth 3 (three) times daily.  -     dexamethasone injection 4 mg  -     methylPREDNISolone acetate injection 80 mg  -     lidocaine HCL 10 mg/ml (1%) injection 2 mL      Reviewed x-rays today with patient reassuring no bony pathology other than a considerable amount   of osteoarthritis throughout the foot.  Advised patient there is a significant improvement previous nerve pain he was having the medial foot  and ankle.  Explained with the state of his nerves, they are obviously pretty delicate and easy to flare,   he now has neuritis involving the plantar fascia of the heel.  Advised patient since he responded very   well to IM cortisone injection regarding the tarsal tunnel pain would recommend injection of the left heel.    I feel we need to be aggressive in treating this since he has not showed much improvement over the  last few months until last visit.    patient was in agreement with treatment plan and did want to pursue injection.  Patient was in  injection plantar fascial insertion left heel utilizing above medications.  Patient   tolerated well.  Bilateral Unna boots lightly applied.  Instructed patient to take off few hours before next appointment    so he  can shower, wash his legs lightly with warm soapy water and pat dry, we will reapply Unna boot   in the office.  We will do this for a few visits to help controlled swelling and healing of blistered skin.    He responds best to these medicated dressings.    Instructed patient to continue Lyrica 3 times daily.  Patient was in understanding and agreement with treatment plan.  Explained to patient unfortunately I agree with him regarding problems with swelling and sores on his   lower legs regarding prolonged walking and standing at work, his legs most likely would benefit from   job with lasts walking and standing.  Patient was in understanding and agreement with treatment plan.  Counseled patient on his conditions, their implications and medical management.  Instructed patient to contact the office with any changes, questions, concerns, worsening of symptoms.   Patient verbalized understanding.   Total face to face time, exam, assessment, treatment, discussion, documentation 25 minutes, more   than half this time spent on consultation and coordination of care.   Follow up 1 week.      This note was created using [employer] voice recognition software that occasionally misinterpreted   phrases or words.

## 2018-12-21 ENCOUNTER — OFFICE VISIT (OUTPATIENT)
Dept: PODIATRY | Facility: CLINIC | Age: 58
End: 2018-12-21
Payer: COMMERCIAL

## 2018-12-21 VITALS
SYSTOLIC BLOOD PRESSURE: 122 MMHG | WEIGHT: 295 LBS | BODY MASS INDEX: 43.69 KG/M2 | HEIGHT: 69 IN | HEART RATE: 98 BPM | TEMPERATURE: 98 F | DIASTOLIC BLOOD PRESSURE: 72 MMHG

## 2018-12-21 DIAGNOSIS — M72.2 PLANTAR FASCIITIS: Primary | ICD-10-CM

## 2018-12-21 DIAGNOSIS — R60.0 EDEMA, LOWER EXTREMITY: ICD-10-CM

## 2018-12-21 DIAGNOSIS — I87.2 VENOUS INSUFFICIENCY OF LEFT LOWER EXTREMITY: ICD-10-CM

## 2018-12-21 DIAGNOSIS — B02.29 OTHER POSTHERPETIC NERVOUS SYSTEM INVOLVEMENT: ICD-10-CM

## 2018-12-21 DIAGNOSIS — G57.52 TARSAL TUNNEL SYNDROME, LEFT LOWER LIMB: ICD-10-CM

## 2018-12-21 PROCEDURE — 3008F BODY MASS INDEX DOCD: CPT | Mod: CPTII,S$GLB,, | Performed by: PODIATRIST

## 2018-12-21 PROCEDURE — 99999 PR PBB SHADOW E&M-EST. PATIENT-LVL III: CPT | Mod: PBBFAC,,, | Performed by: PODIATRIST

## 2018-12-21 PROCEDURE — 29580 STRAPPING UNNA BOOT: CPT | Mod: RT,S$GLB,, | Performed by: PODIATRIST

## 2018-12-21 PROCEDURE — 99213 OFFICE O/P EST LOW 20 MIN: CPT | Mod: 25,S$GLB,, | Performed by: PODIATRIST

## 2018-12-24 ENCOUNTER — CLINICAL SUPPORT (OUTPATIENT)
Dept: PODIATRY | Facility: CLINIC | Age: 58
End: 2018-12-24
Payer: COMMERCIAL

## 2018-12-24 VITALS
HEIGHT: 69 IN | RESPIRATION RATE: 18 BRPM | HEART RATE: 81 BPM | BODY MASS INDEX: 43.56 KG/M2 | DIASTOLIC BLOOD PRESSURE: 77 MMHG | SYSTOLIC BLOOD PRESSURE: 119 MMHG | TEMPERATURE: 99 F

## 2018-12-24 PROCEDURE — 99999 PR PBB SHADOW E&M-EST. PATIENT-LVL II: CPT | Mod: PBBFAC,,,

## 2018-12-24 NOTE — PROGRESS NOTES
Patient presented to clinic for unna boot change to BLL.  VSS, temp 99.0.  Pt denies feeling ill.  He stated he removed unna boots last night before work so he could shower.  BLL appear very red, left leg weeping small amounts of serous fluid.  Unna boots applied to BLL with minimal compression.  Ace wrap applied over unna boots with minimal compression. Patient tolerated well.

## 2018-12-27 ENCOUNTER — OFFICE VISIT (OUTPATIENT)
Dept: PODIATRY | Facility: CLINIC | Age: 58
End: 2018-12-27
Payer: COMMERCIAL

## 2018-12-27 VITALS
TEMPERATURE: 98 F | HEIGHT: 69 IN | WEIGHT: 300 LBS | SYSTOLIC BLOOD PRESSURE: 123 MMHG | BODY MASS INDEX: 44.43 KG/M2 | HEART RATE: 82 BPM | DIASTOLIC BLOOD PRESSURE: 71 MMHG

## 2018-12-27 DIAGNOSIS — R60.0 EDEMA, LOWER EXTREMITY: ICD-10-CM

## 2018-12-27 DIAGNOSIS — I87.2 VENOUS INSUFFICIENCY OF LEFT LOWER EXTREMITY: ICD-10-CM

## 2018-12-27 DIAGNOSIS — G62.9 NEUROPATHY: Primary | ICD-10-CM

## 2018-12-27 DIAGNOSIS — I77.6 VASCULITIS: ICD-10-CM

## 2018-12-27 DIAGNOSIS — I83.023 VENOUS STASIS ULCER OF LEFT ANKLE WITH VARICOSE VEINS, UNSPECIFIED ULCER STAGE: ICD-10-CM

## 2018-12-27 DIAGNOSIS — L97.329 VENOUS STASIS ULCER OF LEFT ANKLE WITH VARICOSE VEINS, UNSPECIFIED ULCER STAGE: ICD-10-CM

## 2018-12-27 PROCEDURE — 99213 PR OFFICE/OUTPT VISIT, EST, LEVL III, 20-29 MIN: ICD-10-PCS | Mod: 25,S$GLB,, | Performed by: PODIATRIST

## 2018-12-27 PROCEDURE — 99999 PR PBB SHADOW E&M-EST. PATIENT-LVL III: CPT | Mod: PBBFAC,,, | Performed by: PODIATRIST

## 2018-12-27 PROCEDURE — 3008F PR BODY MASS INDEX (BMI) DOCUMENTED: ICD-10-PCS | Mod: CPTII,S$GLB,, | Performed by: PODIATRIST

## 2018-12-27 PROCEDURE — 99213 OFFICE O/P EST LOW 20 MIN: CPT | Mod: 25,S$GLB,, | Performed by: PODIATRIST

## 2018-12-27 PROCEDURE — 3008F BODY MASS INDEX DOCD: CPT | Mod: CPTII,S$GLB,, | Performed by: PODIATRIST

## 2018-12-27 PROCEDURE — 29580 STRAPPING UNNA BOOT: CPT | Mod: RT,S$GLB,, | Performed by: PODIATRIST

## 2018-12-27 PROCEDURE — 99999 PR PBB SHADOW E&M-EST. PATIENT-LVL III: ICD-10-PCS | Mod: PBBFAC,,, | Performed by: PODIATRIST

## 2018-12-27 PROCEDURE — 29580 PR STRAPPING UNNA BOOT: ICD-10-PCS | Mod: 51,LT,S$GLB, | Performed by: PODIATRIST

## 2018-12-27 NOTE — PROGRESS NOTES
Subjective:      Patient ID: Earle Hoff is a 58 y.o. male.    Chief Complaint: Follow-up  Patient presents for follow-up edema, rash with blisters dorsal feet lower legs secondary to shingles   and venous insufficiency.  Reports Unna boots have been doing a great job, always comfortable,   always seem to help him.  Relates nerve pain left heel and ankle has subsided quite a bit, starting to  experience some symptoms in the inside of the right ankle.  Is tolerating Lyrica without side effects,   taking 75 mg 3 times daily.  Following up with his PCP this afternoon for shingles      ROS     Constitutional    Pleasant, well-nourished, no distress, well oriented    Cardiovascular          No chest pain, no shortness of breath    Respiratory         No cough, no congestion     Musculoskeletal        YES arthralgias/joint pain, YES swelling in the extremities    Endocrine        DIABETES well controlled, no reported diabetic medication          Objective:      Physical Exam  Vascular         Arterial Pulses Right: posterior tibialis 1/4, dorsalis pedis 2/4, normal CFT   Arterial Pulses Left: posterior tibialis 1/4, dorsalis pedis 1/4, normal CFT   Moderate bilateral lower extremity edema, improving       Integumentary    significant improvement from previous visit.  All previous blisters are dry and healed, dark discoloration        has improved considerably bilateral lower extremities.  There is light pink, dry skin in areas of previous        rash, suspected shingles.  No open areas, no signs of infection  Small stasis ulcer medial left ankle remains closed and dry         Small area of ecchymosis plantar left heel resolved        No new areas a complication bilateral feet and lower legs           Neurological   Gross sensation intact, improved neuritis pain left lower extremity and heel, mild signs tarsal tunnel right         Musculoskeletal   Muscle Strength/Testing and Tone:  Intact, normal tone bilateral    Joints, Bones, and Muscles: Limited ROM, tight, stiffness, guarding      Significant decrease in pain regarding plantar fascia left heel          Assessment:       Encounter Diagnoses   Name Primary?    Plantar fasciitis - Left Foot Yes    Venous insufficiency of left lower extremity     Other postherpetic nervous system involvement     Tarsal tunnel syndrome, left lower limb     Edema, lower extremity          Plan:       Earle was seen today for follow-up.    Diagnoses and all orders for this visit:    Plantar fasciitis - Left Foot    Venous insufficiency of left lower extremity    Other postherpetic nervous system involvement    Tarsal tunnel syndrome, left lower limb    Edema, lower extremity      Advised patient left heel has improved considerably, monitor for any changes.  Continue Lyrica 75 mg t.i.d..  Unna boots applied bilateral    Instructed patient to take off before nurse's visit on Monday so he can shower and then come to the   office for them to be reapplied.  Patient was in understanding and agreement with treatment plan.  Counseled patient on his conditions, their implications and medical management.  Instructed patient to contact the office with any changes, questions, concerns, worsening of symptoms.   Patient verbalized understanding.   Total face to face time, exam, assessment, treatment, discussion, documentation 15 minutes, more   than half this time spent on consultation and coordination of care.   Additional time for bilateral Unna boot  applications  Follow up 3 days    This note was created using [email or web address] voice recognition software that occasionally misinterpreted   phrases or words.

## 2018-12-28 ENCOUNTER — TELEPHONE (OUTPATIENT)
Dept: PODIATRY | Facility: CLINIC | Age: 58
End: 2018-12-28

## 2018-12-28 NOTE — TELEPHONE ENCOUNTER
----- Message from Alexsandra Griffin DPM sent at 12/28/2018  1:41 PM CST -----  Please look into [facility] to eval/tx and or r/o vasculitis

## 2019-01-02 ENCOUNTER — LAB VISIT (OUTPATIENT)
Dept: LAB | Facility: HOSPITAL | Age: 59
End: 2019-01-02
Attending: FAMILY MEDICINE
Payer: COMMERCIAL

## 2019-01-02 ENCOUNTER — OFFICE VISIT (OUTPATIENT)
Dept: PODIATRY | Facility: CLINIC | Age: 59
End: 2019-01-02
Payer: COMMERCIAL

## 2019-01-02 VITALS
HEART RATE: 72 BPM | SYSTOLIC BLOOD PRESSURE: 127 MMHG | DIASTOLIC BLOOD PRESSURE: 67 MMHG | WEIGHT: 300 LBS | HEIGHT: 69 IN | RESPIRATION RATE: 18 BRPM | TEMPERATURE: 98 F | BODY MASS INDEX: 44.43 KG/M2

## 2019-01-02 DIAGNOSIS — I77.6 VASCULITIS: ICD-10-CM

## 2019-01-02 DIAGNOSIS — I83.023 VENOUS STASIS ULCER OF LEFT ANKLE, UNSPECIFIED ULCER STAGE, UNSPECIFIED WHETHER VARICOSE VEINS PRESENT: Primary | ICD-10-CM

## 2019-01-02 DIAGNOSIS — I87.2 VENOUS INSUFFICIENCY: ICD-10-CM

## 2019-01-02 DIAGNOSIS — I77.6 VASCULITIS: Primary | ICD-10-CM

## 2019-01-02 DIAGNOSIS — L97.329 VENOUS STASIS ULCER OF LEFT ANKLE, UNSPECIFIED ULCER STAGE, UNSPECIFIED WHETHER VARICOSE VEINS PRESENT: Primary | ICD-10-CM

## 2019-01-02 DIAGNOSIS — I87.2 VENOUS STASIS DERMATITIS OF BOTH LOWER EXTREMITIES: ICD-10-CM

## 2019-01-02 PROBLEM — L25.9 CONTACT DERMATITIS: Status: ACTIVE | Noted: 2019-01-02

## 2019-01-02 LAB
BASOPHILS # BLD AUTO: 0.02 K/UL
BASOPHILS NFR BLD: 0.2 %
C3 SERPL-MCNC: 144 MG/DL
C4 SERPL-MCNC: 28 MG/DL
CRP SERPL-MCNC: 0.98 MG/DL
DIFFERENTIAL METHOD: ABNORMAL
EOSINOPHIL # BLD AUTO: 0 K/UL
EOSINOPHIL NFR BLD: 0.3 %
ERYTHROCYTE [DISTWIDTH] IN BLOOD BY AUTOMATED COUNT: 16 %
ERYTHROCYTE [SEDIMENTATION RATE] IN BLOOD BY WESTERGREN METHOD: 15 MM/HR
HCT VFR BLD AUTO: 40.8 %
HGB BLD-MCNC: 13.3 G/DL
IMM GRANULOCYTES # BLD AUTO: 0.11 K/UL
IMM GRANULOCYTES NFR BLD AUTO: 1 %
LYMPHOCYTES # BLD AUTO: 1 K/UL
LYMPHOCYTES NFR BLD: 8.4 %
MCH RBC QN AUTO: 28.7 PG
MCHC RBC AUTO-ENTMCNC: 32.6 G/DL
MCV RBC AUTO: 88 FL
MONOCYTES # BLD AUTO: 0.5 K/UL
MONOCYTES NFR BLD: 4.5 %
NEUTROPHILS # BLD AUTO: 9.8 K/UL
NEUTROPHILS NFR BLD: 85.6 %
NRBC BLD-RTO: 0 /100 WBC
PLATELET # BLD AUTO: 207 K/UL
PMV BLD AUTO: 9.8 FL
RBC # BLD AUTO: 4.64 M/UL
WBC # BLD AUTO: 11.48 K/UL

## 2019-01-02 PROCEDURE — 36415 COLL VENOUS BLD VENIPUNCTURE: CPT

## 2019-01-02 PROCEDURE — 86160 COMPLEMENT ANTIGEN: CPT

## 2019-01-02 PROCEDURE — 29580 PR STRAPPING UNNA BOOT: ICD-10-PCS | Mod: 51,LT,S$GLB, | Performed by: FAMILY MEDICINE

## 2019-01-02 PROCEDURE — 3008F BODY MASS INDEX DOCD: CPT | Mod: CPTII,S$GLB,, | Performed by: FAMILY MEDICINE

## 2019-01-02 PROCEDURE — 99999 PR PBB SHADOW E&M-EST. PATIENT-LVL III: ICD-10-PCS | Mod: PBBFAC,,, | Performed by: FAMILY MEDICINE

## 2019-01-02 PROCEDURE — 99214 PR OFFICE/OUTPT VISIT, EST, LEVL IV, 30-39 MIN: ICD-10-PCS | Mod: 25,S$GLB,, | Performed by: FAMILY MEDICINE

## 2019-01-02 PROCEDURE — 99999 PR PBB SHADOW E&M-EST. PATIENT-LVL III: CPT | Mod: PBBFAC,,, | Performed by: FAMILY MEDICINE

## 2019-01-02 PROCEDURE — 29580 STRAPPING UNNA BOOT: CPT | Mod: RT,S$GLB,, | Performed by: FAMILY MEDICINE

## 2019-01-02 PROCEDURE — 3008F PR BODY MASS INDEX (BMI) DOCUMENTED: ICD-10-PCS | Mod: CPTII,S$GLB,, | Performed by: FAMILY MEDICINE

## 2019-01-02 PROCEDURE — 86160 COMPLEMENT ANTIGEN: CPT | Mod: 59

## 2019-01-02 PROCEDURE — 99214 OFFICE O/P EST MOD 30 MIN: CPT | Mod: 25,S$GLB,, | Performed by: FAMILY MEDICINE

## 2019-01-02 PROCEDURE — 86255 FLUORESCENT ANTIBODY SCREEN: CPT

## 2019-01-02 PROCEDURE — 85025 COMPLETE CBC W/AUTO DIFF WBC: CPT

## 2019-01-02 PROCEDURE — 85651 RBC SED RATE NONAUTOMATED: CPT

## 2019-01-02 PROCEDURE — 86038 ANTINUCLEAR ANTIBODIES: CPT

## 2019-01-02 PROCEDURE — 86431 RHEUMATOID FACTOR QUANT: CPT

## 2019-01-02 PROCEDURE — 86140 C-REACTIVE PROTEIN: CPT

## 2019-01-02 RX ORDER — OXYCODONE AND ACETAMINOPHEN 10; 325 MG/1; MG/1
1 TABLET ORAL EVERY 6 HOURS PRN
Qty: 30 TABLET | Refills: 0 | Status: SHIPPED | OUTPATIENT
Start: 2019-01-02 | End: 2019-01-17 | Stop reason: SDUPTHER

## 2019-01-02 NOTE — PROGRESS NOTES
Subjective:       Patient ID: Earle Hoff is a 58 y.o. male.    Chief Complaint: Follow-up    HPI   Mr. Hoff presents for follow-up and unna boot placement. Reports some improvement in itching and redness over the past 2 weeks. Request refill of percocet, which he takes sparingly, mainly at night. Is seeing podiatry, Dr. Alexsandra Griffin, and is in the process of being referred to vascular and rheumatology. Sees his allergist again tomorrow.    Review of Systems   Constitutional: Negative for chills, fatigue, fever and unexpected weight change.   Respiratory: Negative for cough, chest tightness, shortness of breath and wheezing.    Skin: Positive for color change and rash. Negative for pallor and wound.       Past Medical History:   Diagnosis Date    Arthritis     Edema, lower extremity     Gout     Stasis ulcer of lower extremity, left      Past Surgical History:   Procedure Laterality Date    KNEE ARTHROSCOPY W/ MENISCAL REPAIR      SHOULDER ARTHROSCOPY       Social History     Socioeconomic History    Marital status:      Spouse name: Not on file    Number of children: Not on file    Years of education: Not on file    Highest education level: Not on file   Social Needs    Financial resource strain: Not on file    Food insecurity - worry: Not on file    Food insecurity - inability: Not on file    Transportation needs - medical: Not on file    Transportation needs - non-medical: Not on file   Occupational History    Not on file   Tobacco Use    Smoking status: Former Smoker     Packs/day: 0.50     Types: Cigarettes     Last attempt to quit: 12/31/2018    Smokeless tobacco: Never Used   Substance and Sexual Activity    Alcohol use: No    Drug use: No    Sexual activity: Not on file   Other Topics Concern    Not on file   Social History Narrative    Not on file     History reviewed. No pertinent family history.    Objective:      /67 (BP Location: Left arm, Patient Position:  "Sitting, BP Method: Medium (Automatic))   Pulse 72   Temp 98.1 °F (36.7 °C) (Oral)   Resp 18   Ht 5' 9" (1.753 m)   Wt 136.1 kg (300 lb)   BMI 44.30 kg/m²   Physical Exam   Constitutional: He is oriented to person, place, and time. He appears well-developed and well-nourished. No distress.   obese   Neurological: He is alert and oriented to person, place, and time.   Skin: Skin is warm and dry. Rash noted. He is not diaphoretic. There is erythema.   Erythema to bilateral LE's from tops of the feet to just below the knees; excoriations to medial and lateral ankles and tops of feet   Vitals reviewed.      Assessment:       1. Vasculitis    2. Venous stasis dermatitis of both lower extremities        Plan:       Vasculitis  -     Being referred to Rheumatology by Dr. Griffin; obtain labs below  -     C-reactive protein; Future; Expected date: 01/02/2019  -     CAROL; Future; Expected date: 01/02/2019  -     Rheumatoid factor; Future; Expected date: 01/02/2019  -     Sedimentation rate, manual; Future; Expected date: 01/02/2019  -     CBC auto differential; Future; Expected date: 04/02/2019  -     C3 complement; Future; Expected date: 01/02/2019  -     C4 complement; Future; Expected date: 01/02/2019  -     Anti-neutrophilic cytoplasmic antibody; Future; Expected date: 01/02/2019  -     oxyCODONE-acetaminophen (PERCOCET)  mg per tablet; Take 1 tablet by mouth every 6 (six) hours as needed for Pain.  Dispense: 30 tablet; Refill: 0    Venous stasis dermatitis of both lower extremities  -     Unna boots placed bilaterally  -     Refilling med below; patient takes sparingly;  consistent.  -     oxyCODONE-acetaminophen (PERCOCET)  mg per tablet; Take 1 tablet by mouth every 6 (six) hours as needed for Pain.  Dispense: 30 tablet; Refill: 0            Risks, benefits, and side effects were discussed with the patient. All questions were answered to the fullest satisfaction of the patient, and pt verbalized " understanding and agreement to treatment plan. Pt was to call with any new or worsening symptoms, or present to the ER.

## 2019-01-03 ENCOUNTER — TELEPHONE (OUTPATIENT)
Dept: ADMINISTRATIVE | Facility: OTHER | Age: 59
End: 2019-01-03

## 2019-01-03 ENCOUNTER — TELEPHONE (OUTPATIENT)
Dept: PODIATRY | Facility: CLINIC | Age: 59
End: 2019-01-03

## 2019-01-03 LAB
ANA SER QL IF: NORMAL
RHEUMATOID FACT SERPL-ACNC: <10 IU/ML

## 2019-01-03 NOTE — TELEPHONE ENCOUNTER
----- Message from María Wilkins sent at 1/3/2019 12:54 PM CST -----  Contact: pt  ..Type:  Sooner Apoointment Request    Caller is requesting a sooner appointment.  Caller declined first available appointment listed below.  Caller will not accept being placed on the waitlist and is requesting a message be sent to doctor.    Name of Caller:  pt  When is the first available appointment?  No availability to schedule for pt  Best Call Back Number:    Additional Information:  Pt would like to speak with a nurse to schedule a NP appt.  Pt has referral in from Dr Alexsandra Griffin. Please call to schedule  Thanks

## 2019-01-03 NOTE — TELEPHONE ENCOUNTER
----- Message from Mildred Kelley sent at 1/3/2019 12:08 PM CST -----  Contact: pt  Type: Needs Medical Advice    Who Called:  Pt  Best Call Back Number: [phone] (home)   Additional Information: Please call patient in regards to a call from Vein Dr dont remember who he was referred to.

## 2019-01-06 NOTE — PROGRESS NOTES
Subjective:      Patient ID: Earle Hoff is a 58 y.o. male.    Chief Complaint: Follow-up  Patient presents for follow-up neuropathic pain, edema, venous insufficiency.  Relates nerve pain   Has remained more prominent on the inside of the right foot and ankle.  Has continued Lyrica 75mg   Tid.      ROS     Constitutional    Pleasant, obese (BMI 44.30) , no distress, well oriented    Cardiovascular          No chest pain, no shortness of breath    Respiratory         No cough, no congestion     Musculoskeletal        YES arthralgias/joint pain, YES swelling in the extremities    Neurological        YES peripheral neuropathy/neuritis    Endocrine        H/o DIABETES well controlled, no reported diabetic medication          Objective:      Physical Exam  Vascular         Arterial Pulses Right: posterior tibialis 1/4, dorsalis pedis 2/4, normal CFT   Arterial Pulses Left: posterior tibialis 1/4, dorsalis pedis 1/4, normal CFT   Moderate bilateral lower extremity edema,  venous insufficiency left greater than right      Integumentary    significant improvement from previous visit.  All previous blisters are dry and healed, dark discoloration        has improved considerably bilateral lower extremities.  There is light pink, dry skin in areas of previous        rash, suspected shingles.  No open areas, no signs of infection  Small stasis ulcer medial left ankle remains closed and dry         Small area of ecchymosis plantar left heel resolved        No new areas a complication bilateral feet and lower legs           Neurological   Gross sensation intact, neuropathic/neuritis pain bilateral lower extremity/tarsal tunnel regions         Musculoskeletal   Muscle Strength/Testing and Tone:  Intact, walks well unassisted  Joints, Bones, and Muscles: Limited ROM, tight, stiffness, guarding              Assessment:       Encounter Diagnoses   Name Primary?    Neuropathy Yes    Venous insufficiency of left lower extremity      Vasculitis     Edema, lower extremity     Venous stasis ulcer of left ankle with varicose veins, unspecified ulcer stage          Plan:       Earle was seen today for follow-up.    Diagnoses and all orders for this visit:    Neuropathy    Venous insufficiency of left lower extremity    Vasculitis    Edema, lower extremity    Venous stasis ulcer of left ankle with varicose veins, unspecified ulcer stage      Discussed referral to Rheumatology for vasculitis.  Advised patient I am not convinced rash on feet and   legs is due to shingles,  Nerve pain can be due to chronic inflammation.  Patient was receptive to this   and advised we will put in a referral to Rheumatology / [facility] and have nurse contact him   With doctor's name.  At that point if he does not hear from them in a few days he was instructed to call   them to check on the referral.  Continue Lyrica 75 mg t.i.d..  Unna boots applied bilateral.  Patient was in understanding and agreement with treatment plan.  Counseled patient on his conditions, their implications and medical management.  Instructed patient to contact the office with any changes, questions, concerns, worsening of symptoms.   Patient verbalized understanding.   Total face to face time, exam, assessment, treatment, discussion, documentation 15 minutes, more   than half this time spent on consultation and coordination of care.   Additional time for bilateral Unna boot  applications  Follow up with Dr Valentine 1/2/2019     This note was created using [employer] voice recognition software that occasionally misinterpreted   phrases or words.

## 2019-01-07 LAB
ANCA AB TITR SER IF: NORMAL TITER
P-ANCA TITR SER IF: NORMAL TITER

## 2019-01-08 ENCOUNTER — OFFICE VISIT (OUTPATIENT)
Dept: PODIATRY | Facility: CLINIC | Age: 59
End: 2019-01-08
Payer: COMMERCIAL

## 2019-01-08 VITALS — HEIGHT: 69 IN | RESPIRATION RATE: 18 BRPM | OXYGEN SATURATION: 98 % | BODY MASS INDEX: 44.43 KG/M2 | WEIGHT: 300 LBS

## 2019-01-08 DIAGNOSIS — R60.0 EDEMA, LOWER EXTREMITY: ICD-10-CM

## 2019-01-08 DIAGNOSIS — I83.023 VENOUS STASIS ULCER OF LEFT ANKLE, UNSPECIFIED ULCER STAGE, UNSPECIFIED WHETHER VARICOSE VEINS PRESENT: ICD-10-CM

## 2019-01-08 DIAGNOSIS — L97.329 VENOUS STASIS ULCER OF LEFT ANKLE, UNSPECIFIED ULCER STAGE, UNSPECIFIED WHETHER VARICOSE VEINS PRESENT: ICD-10-CM

## 2019-01-08 DIAGNOSIS — G62.9 NEUROPATHY: ICD-10-CM

## 2019-01-08 DIAGNOSIS — G57.50 TARSAL TUNNEL SYNDROME, UNSPECIFIED LATERALITY: ICD-10-CM

## 2019-01-08 DIAGNOSIS — I77.6 VASCULITIS: ICD-10-CM

## 2019-01-08 DIAGNOSIS — I87.2 VENOUS INSUFFICIENCY: Primary | ICD-10-CM

## 2019-01-08 PROCEDURE — 99214 OFFICE O/P EST MOD 30 MIN: CPT | Mod: 25,S$GLB,, | Performed by: PODIATRIST

## 2019-01-08 PROCEDURE — 29580 PR STRAPPING UNNA BOOT: ICD-10-PCS | Mod: 51,RT,S$GLB, | Performed by: PODIATRIST

## 2019-01-08 PROCEDURE — 99999 PR PBB SHADOW E&M-EST. PATIENT-LVL III: CPT | Mod: PBBFAC,,, | Performed by: PODIATRIST

## 2019-01-08 PROCEDURE — 99214 PR OFFICE/OUTPT VISIT, EST, LEVL IV, 30-39 MIN: ICD-10-PCS | Mod: 25,S$GLB,, | Performed by: PODIATRIST

## 2019-01-08 PROCEDURE — 29580 STRAPPING UNNA BOOT: CPT | Mod: LT,S$GLB,, | Performed by: PODIATRIST

## 2019-01-08 PROCEDURE — 3008F BODY MASS INDEX DOCD: CPT | Mod: CPTII,S$GLB,, | Performed by: PODIATRIST

## 2019-01-08 PROCEDURE — 99999 PR PBB SHADOW E&M-EST. PATIENT-LVL III: ICD-10-PCS | Mod: PBBFAC,,, | Performed by: PODIATRIST

## 2019-01-08 PROCEDURE — 3008F PR BODY MASS INDEX (BMI) DOCUMENTED: ICD-10-PCS | Mod: CPTII,S$GLB,, | Performed by: PODIATRIST

## 2019-01-08 RX ORDER — SULFAMETHOXAZOLE AND TRIMETHOPRIM 400; 80 MG/1; MG/1
TABLET ORAL
COMMUNITY
Start: 2019-01-03 | End: 2022-02-23

## 2019-01-09 NOTE — PROGRESS NOTES
Subjective:      Patient ID: Earle Hoff is a 58 y.o. male.    Chief Complaint: Follow-up  Patient presents for follow-up venous insufficiency with lower extremity edema, on neuropathic pain   specifically in the region of the tarsal tunnel left greater than right, stasis ulcer medial left foot.  States  areas have been doing very well since last visit, using Eucrisa, Aveeno, oatmeal baths.  He has not   worked since December 31st,  Has quit his job due to problems with his legs.  He states since he is   not standing all night at work he has had very little swelling.  He is hoping to return to work once   condition with his legs is resolved.  He relates however still having constant shooting pain in the inside   of the feet and ankles, it has improved since he is not on his feet as much, but still constant throughout   the day and evening, uses  Percocet sparingly, Dr. Valentine refilled last visit.  Pain level 5/10.  He is waiting for an appointment from rheumatology. Reports Dr. Valentine ordered blood work for him   prior to that visit.      ROS       Constitutional    Pleasant, obese (BMI 44.30) , no distress, well oriented    Cardiovascular          No chest pain, no shortness of breath    Respiratory         No cough, no congestion     Musculoskeletal        YES arthralgias/joint pain, YES swelling in the extremities    Neurological        YES neuritis/neuropathy    Endocrine        H/o DIABETES well controlled, no reported diabetic medication          Objective:      Physical Exam    Vascular         Arterial Pulses Right: posterior tibialis 1/4, dorsalis pedis 2/4, normal CFT   Arterial Pulses Left: posterior tibialis 1/4, dorsalis pedis 1/4, normal CFT   Moderate bilateral lower extremity edema,  venous insufficiency left greater than right      Integumentary   There has been continued improvement over the last few weeks.  Skin is smooth, light erythema      Dorsal feet and lower legs L>R.        There are  some small cracking anterior left lower leg.  Venous ulcer medial left foot / ankle is       dry, closed.  This area has remain healed last several visits.     Neurological   Gross sensation intact, painful, cyclic neuritis  involving tarsal tunnel left greater than right        Musculoskeletal   Muscle Strength/Testing and Tone:  Intact, walks well unassisted  Joints, Bones, and Muscles: Limited ROM, tight, stiffness, guarding    C-reactive protein   Order: [id number]   Status:  Final result   Visible to patient:  No (Not Released) Next appt:  01/17/2019 at 08:30 AM in Podiatry (Alexsandra Griffin DPM) Dx:  Vasculitis    Ref Range & Units 7d ago   CRP 0.00 - 0.75 mg/dL 0.98 Abnormally high             CAROL   Order: [id number]   Status:  Final result   Visible to patient:  No (Not Released) Next appt:  01/17/2019 at 08:30 AM in Podiatry (Alexsandra Griffin DPM) Dx:  Vasculitis    Ref Range & Units 7d ago   CAROL Screen Negative <1:160 Negative <1:160            Rheumatoid factor   Order: [id number]   Status:  Final result   Visible to patient:  No (Not Released) Next appt:  01/17/2019 at 08:30 AM in Podiatry (Alexsandra Griffin DPM) Dx:  Vasculitis    Ref Range & Units 7d ago   Rheumatoid Factor 0.0 - 15.0 IU/mL <10.0            Contains abnormal data Sedimentation rate, manual   Order: [id number]   Status:  Final result   Visible to patient:  No (Not Released) Next appt:  01/17/2019 at 08:30 AM in Podiatry (Alexsandra Griffin DPM) Dx:  Vasculitis    Ref Range & Units 7d ago   Sed Rate 0 - 10 mm/Hr 15 Abnormally high             Lymph #1.0 - 4.8 K/uL1.0 Mono #0.3 - 1.0 K/uL0.5 Eos #0.0 - 0.5 K/uL0.0 Baso #0.00 - 0.20 K/uL0.02 nRBC0 /100 WBC0 Gran%38.0 - 73.0 %85.6   Abnormally high  Lymph%18.0 - 48.0 %8.4 Abnormally low  Mono%4.0 - 15.0 %4.5 Eosinophil%0.0 - 8.0 %0.3 Basophil%0.0 - 1.9 %0.2   Ref Range & Hhfwq8f ago  WBC3.90 - 12.70 K/uL11.48 RBC4.60 - 6.20 M/uL4.64 Wqyzxawxlp22.0 - 18.0 g/dL13.3 Abnormally low  Mjiwsnyllu47.0 -  54.0 %40.8 MCV82 -   98 fL88 MCH27.0 - 31.0 pg28.7 MCHC32.0 - 36.0 g/dL32.6 RDW11.5 - 14.5 %16.0 Abnormally high  Mdyipunsl534 - 350 K/uL207 MPV9.2 -   12.9 fL9.8 Immature Granulocytes0.0 - 0.5 %1.0 Abnormally high  Gran # (ANC)1.8 - 7.7 K/uL9.8 Abnormally high  Immature Grans (Abs)0.00 -   0.04 K/uL0.11 Abnormally high    C3 complement   Order: [id number]   Status:  Final result   Visible to patient:  No (Not Released) Next appt:  01/17/2019 at 08:30 AM in Podiatry (Alexsandra Griffin DPM) Dx:  Vasculitis    Ref Range & Units 7d ago   Complement (C-3) 50 - 180 mg/dL 144            C4 complement   Order: [id number]   Status:  Final result   Visible to patient:  No (Not Released) Next appt:  01/17/2019 at 08:30 AM in Podiatry (Alexsandra Griffin DPM) Dx:  Vasculitis    Ref Range & Units 7d ago   Complement (C-4) 11 - 44 mg/dL 28            Anti-neutrophilic cytoplasmic antibody   Order: [id number]   Status:  Final result   Visible to patient:  No (Not Released) Next appt:  01/17/2019 at 08:30 AM in Podiatry (Alexsandra Griffin DPM) Dx:  Vasculitis    Ref Range & Units 7d ago   Cytoplasmic Neutrophilic Ab <1:20 Titer <1:20    Comment: Test performed at [facility],   [facility] [place]  [address]     [phone]   Dony Hopkins MD  - Medical Director    Perinuclear (P-ANCA) <1:20 Titer <1:20                  Assessment:       Encounter Diagnoses   Name Primary?    Venous insufficiency Yes    Vasculitis     Neuropathy     Tarsal tunnel syndrome, unspecified laterality     Edema, lower extremity     Venous stasis ulcer of left ankle, unspecified ulcer stage, unspecified whether varicose veins present          Plan:       Earle was seen today for follow-up.    Diagnoses and all orders for this visit:    Venous insufficiency    Vasculitis    Neuropathy    Tarsal tunnel syndrome, unspecified laterality    Edema, lower extremity    Venous stasis ulcer of left ankle, unspecified ulcer stage,  unspecified whether varicose veins present      Advised patient significant improvement since  He has not been working, prolonged walking and standing   is definitely been an obstacle, however whether blood work is indicative of potential vasculitis or not he   needs to follow through with the rheumatologist.  Did advise to positives on his blood work indicating   inflammation and I feel he needs a thorough workup by rheumatology.  Patient was in understanding and   agreement with treatment plan.  He was advised doctor [address], rheumatologist on our end has received   his referral in should be contacting him in the next few days for an appointment.  If he has not heard from   them by Monday I would recommend he calls our office.  Patient verbalized understanding.  Patient is going to continue Lyrica 75 mg t.i.d. And Percocet as needed for neuropathic pain. Also advised   patient hopefully with a more accurate diagnosis and treatment through Rheumatology this inflammation   and neuritis will resolve.  Advised patient I feel this is due to acute inflammation / neuritis and is not long-  term diabetic neuropathy.  Bilateral Unna boots applied.  Patient is very familiar with these dressings and will keep them intact for   3-5 days depending on comfort.  He is aware he is to remove them if there is any drainage through the   bandage, any increased swelling or pain.  Following removal of the Unna boots he is to continue use of  Eurista, Aveeno as needed, oatmeal baths which has been working very well last few weeks.  Instructed   patient to immediately contact the office with any changes.  Patient was in understanding and agreement with treatment plan.  Counseled patient on his conditions, their implications and medical management.  Instructed patient to contact the office with any changes, questions, concerns, worsening of symptoms.   Patient verbalized understanding.   Total face to face time, exam, assessment,  treatment, discussion, documentation 25 minutes, more   than half this time spent on consultation and coordination of care.   Additional time for bilateral Unna boot  applications  Follow up 1 week     This note was created using [employer] voice recognition software that occasionally misinterpreted   phrases or words.

## 2019-01-17 ENCOUNTER — OFFICE VISIT (OUTPATIENT)
Dept: PODIATRY | Facility: CLINIC | Age: 59
End: 2019-01-17
Payer: COMMERCIAL

## 2019-01-17 ENCOUNTER — TELEPHONE (OUTPATIENT)
Dept: PODIATRY | Facility: CLINIC | Age: 59
End: 2019-01-17

## 2019-01-17 ENCOUNTER — TELEPHONE (OUTPATIENT)
Dept: RHEUMATOLOGY | Facility: CLINIC | Age: 59
End: 2019-01-17

## 2019-01-17 VITALS
TEMPERATURE: 98 F | SYSTOLIC BLOOD PRESSURE: 107 MMHG | WEIGHT: 300 LBS | DIASTOLIC BLOOD PRESSURE: 64 MMHG | HEIGHT: 69 IN | HEART RATE: 79 BPM | BODY MASS INDEX: 44.43 KG/M2

## 2019-01-17 DIAGNOSIS — L85.3 DRY SKIN DERMATITIS: ICD-10-CM

## 2019-01-17 DIAGNOSIS — G57.92 NEURITIS OF LEFT FOOT: ICD-10-CM

## 2019-01-17 DIAGNOSIS — I87.2 VENOUS INSUFFICIENCY OF BOTH LOWER EXTREMITIES: Primary | ICD-10-CM

## 2019-01-17 DIAGNOSIS — I77.6 VASCULITIS: ICD-10-CM

## 2019-01-17 DIAGNOSIS — I87.2 VENOUS STASIS DERMATITIS OF BOTH LOWER EXTREMITIES: ICD-10-CM

## 2019-01-17 PROCEDURE — 3008F PR BODY MASS INDEX (BMI) DOCUMENTED: ICD-10-PCS | Mod: CPTII,S$GLB,, | Performed by: PODIATRIST

## 2019-01-17 PROCEDURE — 99213 OFFICE O/P EST LOW 20 MIN: CPT | Mod: 25,S$GLB,, | Performed by: PODIATRIST

## 2019-01-17 PROCEDURE — 29580 STRAPPING UNNA BOOT: CPT | Mod: 51,LT,S$GLB, | Performed by: PODIATRIST

## 2019-01-17 PROCEDURE — 99999 PR PBB SHADOW E&M-EST. PATIENT-LVL III: ICD-10-PCS | Mod: PBBFAC,,, | Performed by: PODIATRIST

## 2019-01-17 PROCEDURE — 29580 PR STRAPPING UNNA BOOT: ICD-10-PCS | Mod: 51,LT,S$GLB, | Performed by: PODIATRIST

## 2019-01-17 PROCEDURE — 3008F BODY MASS INDEX DOCD: CPT | Mod: CPTII,S$GLB,, | Performed by: PODIATRIST

## 2019-01-17 PROCEDURE — 99213 PR OFFICE/OUTPT VISIT, EST, LEVL III, 20-29 MIN: ICD-10-PCS | Mod: 25,S$GLB,, | Performed by: PODIATRIST

## 2019-01-17 PROCEDURE — 99999 PR PBB SHADOW E&M-EST. PATIENT-LVL III: CPT | Mod: PBBFAC,,, | Performed by: PODIATRIST

## 2019-01-17 RX ORDER — OXYCODONE AND ACETAMINOPHEN 10; 325 MG/1; MG/1
1 TABLET ORAL EVERY 8 HOURS PRN
Qty: 30 TABLET | Refills: 0 | Status: SHIPPED | OUTPATIENT
Start: 2019-01-17 | End: 2019-02-19 | Stop reason: SDUPTHER

## 2019-01-17 NOTE — TELEPHONE ENCOUNTER
----- Message from María Wilkins sent at 1/17/2019 11:40 AM CST -----  Contact: Cristina/Dr Alexsandra Griffin's Office  ..Type:  Sooner Apoointment Request    Caller is requesting a sooner appointment.  Caller declined first available appointment listed below.  Caller will not accept being placed on the waitlist and is requesting a message be sent to doctor.    Name of Caller: Cristina/Dr Alexsandra Griffin's Office  When is the first available appointment?  4-8-19  Best Call Back Number:  or   Additional Information: Cristina would like to speak with a nurse to schedule an appt for pt to be seen ASAP! Pt has a referral in chart  Please call to schedule.  Thanks

## 2019-01-17 NOTE — TELEPHONE ENCOUNTER
Unable to send message to Dr. Summers or Dr. Hazel staff. Called call center to have message sent for patient to please be scheduled ASAP for rheumatology appointment.

## 2019-01-17 NOTE — TELEPHONE ENCOUNTER
Julián    Pt has a rheumatology referral in from Dr. Alexsandra Griffin from 1/2/19.  He had a f/u today with her and stated he hasn't heard anything yet and waiting for an appointment to be made.  I can see a nurse's note from 1/4/19 where she stated she would contact the patient to schedule the appt but can't see any documentation where that was done. Is it possible for the patient to be contacted today to be scheduled ASAP?    Thanks!

## 2019-01-19 NOTE — PROGRESS NOTES
Subjective:      Patient ID: Earle Hoff is a 58 y.o. male.    Chief Complaint: Follow-up  Patient presents for follow-up venous insufficiency with lower extremity edema, dermatitis,  neuropathic   pain.  Reports areas continue to improve, skin is smooth, but redness has not resolved, some dry skin   and cracking.  Has been using the eucrisa, however relates he gets the most relief from Unna boots.  Nerve pain improved on the right, however continues on the left.  Does reported is has decreased slightly.  Pain level 4/10      ROS     Constitutional    Pleasant, obese (BMI 44.30) , no distress, well oriented    Cardiovascular          No chest pain, no shortness of breath    Respiratory          No cough, no congestion     Musculoskeletal         YES arthralgias/joint pain, YES swelling in the extremities    Neurological         YES neuritis/neuropathy    Endocrine         h/o DIABETES well controlled, no reported diabetic medication          Objective:      Physical Exam  Vascular         Arterial Pulses Right: posterior tibialis 1/4, dorsalis pedis 2/4, normal CFT   Arterial Pulses Left: posterior tibialis 1/4, dorsalis pedis 1/4, normal CFT   Mild bilateral lower extremity edema, much improved   Chronic venous insufficiency left greater than right      Integumentary   Some improvement  Noted in about the last 9 days regarding well demarcated, patchy area of        erythema  with dry skin dermatitis bilateral lower extremity  Skin is smooth, light erythema,       No weeping.   Venous ulcer medial left foot / ankle remains well closed and damaged skin is improved    Neurological   Gross sensation intact, painful, cyclic neuritis  involving tarsal tunnel left side       Musculoskeletal   Muscle Strength/Testing and Tone:  Intact, walks well unassisted  Joints, Bones, and Muscles: Limited ROM, tight, stiffness, guarding              Assessment:       Encounter Diagnoses   Name Primary?    Neuritis of left foot      Venous stasis dermatitis of both lower extremities     Dry skin dermatitis     Vasculitis     Venous insufficiency of both lower extremities Yes         Plan:       Earle was seen today for follow-up.    Diagnoses and all orders for this visit:    Venous insufficiency of both lower extremities    Neuritis of left foot    Venous stasis dermatitis of both lower extremities  -     oxyCODONE-acetaminophen (PERCOCET)  mg per tablet; Take 1 tablet by mouth every 8 (eight) hours as needed for Pain.    Dry skin dermatitis    Vasculitis  -     oxyCODONE-acetaminophen (PERCOCET)  mg per tablet; Take 1 tablet by mouth every 8 (eight) hours as needed for Pain.      Advised patient after reviewing Dr. Valentine message regarding his lab work she does not feel vasculitis   is potential underlying cause of his lower extremity flare-ups. Explained even if this condition is ruled   out blood work was positive for chronic underlying inflammatory process that needs to be further   investigated by rheumatology.  Advised patient we will contact Dr. Otto RN regarding appointment.    They have been in contact with us and confirmed they have received the referral.  In the meantime   advised patient continue current care treatment.   Patient was in understanding and agreement with   treatment plan.  Bilateral Unna boots applied today.  Continue Lyrica 75 mg t.i.d..   Percocet prescribed to take as directed, only as needed for nerve pain left foot  Patient was in understanding and agreement with treatment plan.  Counseled patient on his conditions, their implications and medical management.  Instructed patient to contact the office with any changes, questions, concerns, worsening of symptoms.   Patient verbalized understanding.   Total face to face time, exam, assessment, treatment, discussion, documentation 25 minutes, more   than half this time spent on consultation and coordination of care.   Additional time for  bilateral Unna boot  applications  Follow up 5 days     This note was created using [employer] voice recognition software that occasionally misinterpreted   phrases or words.

## 2019-01-22 ENCOUNTER — OFFICE VISIT (OUTPATIENT)
Dept: PODIATRY | Facility: CLINIC | Age: 59
End: 2019-01-22
Payer: COMMERCIAL

## 2019-01-22 VITALS
WEIGHT: 300 LBS | HEIGHT: 69 IN | OXYGEN SATURATION: 98 % | RESPIRATION RATE: 18 BRPM | TEMPERATURE: 99 F | SYSTOLIC BLOOD PRESSURE: 126 MMHG | BODY MASS INDEX: 44.43 KG/M2 | HEART RATE: 78 BPM | DIASTOLIC BLOOD PRESSURE: 81 MMHG

## 2019-01-22 DIAGNOSIS — G57.52 TARSAL TUNNEL SYNDROME, LEFT LOWER LIMB: Primary | ICD-10-CM

## 2019-01-22 DIAGNOSIS — I87.2 VENOUS INSUFFICIENCY: ICD-10-CM

## 2019-01-22 DIAGNOSIS — R60.0 EDEMA, LOWER EXTREMITY: ICD-10-CM

## 2019-01-22 DIAGNOSIS — L30.9 DERMATITIS: ICD-10-CM

## 2019-01-22 PROCEDURE — 99999 PR PBB SHADOW E&M-EST. PATIENT-LVL III: ICD-10-PCS | Mod: PBBFAC,,, | Performed by: PODIATRIST

## 2019-01-22 PROCEDURE — 3008F BODY MASS INDEX DOCD: CPT | Mod: CPTII,S$GLB,, | Performed by: PODIATRIST

## 2019-01-22 PROCEDURE — 3008F PR BODY MASS INDEX (BMI) DOCUMENTED: ICD-10-PCS | Mod: CPTII,S$GLB,, | Performed by: PODIATRIST

## 2019-01-22 PROCEDURE — 99214 OFFICE O/P EST MOD 30 MIN: CPT | Mod: 25,S$GLB,, | Performed by: PODIATRIST

## 2019-01-22 PROCEDURE — 29580 PR STRAPPING UNNA BOOT: ICD-10-PCS | Mod: 51,LT,S$GLB, | Performed by: PODIATRIST

## 2019-01-22 PROCEDURE — 99999 PR PBB SHADOW E&M-EST. PATIENT-LVL III: CPT | Mod: PBBFAC,,, | Performed by: PODIATRIST

## 2019-01-22 PROCEDURE — 29580 STRAPPING UNNA BOOT: CPT | Mod: RT,S$GLB,, | Performed by: PODIATRIST

## 2019-01-22 PROCEDURE — 99214 PR OFFICE/OUTPT VISIT, EST, LEVL IV, 30-39 MIN: ICD-10-PCS | Mod: 25,S$GLB,, | Performed by: PODIATRIST

## 2019-01-22 RX ORDER — MELOXICAM 15 MG/1
15 TABLET ORAL DAILY
Qty: 30 TABLET | Refills: 2 | Status: SHIPPED | OUTPATIENT
Start: 2019-01-22 | End: 2019-02-21

## 2019-01-23 NOTE — PROGRESS NOTES
Subjective:      Patient ID: Earle Hoff is a 58 y.o. male.    Chief Complaint: Follow-up (brice boots)  Patient presents with his wife for follow-up venous insufficiency, lower extremity edema, dermatitis,    neuropathic pain due to tarsal tunnel left.  Patient reports nerve pain in the right lower leg has remained   pretty well controlled.  Pain in the left lower leg again has improved since he stopped working, but still    Shooting pain on a fairly constant basis.  He removed Unna boots prior to coming in today.  He reports   his legs feel best when wrapped with Unna boots.  States although much improved skin remains bright   pink, left leg feels warm.  He did finally get an appointment with a rheumatologist in [place], Dr. Summers   but it is in April.  Patient reports he cannot wait that long.  Pain level 7/10 today.      ROS     Constitutional    Pleasant, obese (BMI 44.30) , no distress, well oriented    Cardiovascular          No chest pain, no shortness of breath    Respiratory          No cough, no congestion     Musculoskeletal         YES arthralgias/joint pain, YES swelling in the extremities    Neurological         YES neuritis/neuropathy    Endocrine         h/o DIABETES well controlled, no reported diabetic medication          Objective:      Physical Exam  Vascular         Arterial Pulses Right: posterior tibialis 1/4, dorsalis pedis 2/4, normal CFT   Arterial Pulses Left: posterior tibialis 1/4, dorsalis pedis 1/4, normal CFT   Mild bilateral lower extremity edema, much improved   Chronic venous insufficiency left greater than right      Integumentary           Well demarcated areas of erythematous dermatitis bilateral lower extremity.  Skin is improved,              smooth, no skin opening or weeping. Area of venous ulcer medial left foot / ankle remains             closed/healed.  Skin on the left lower leg has some mild calor.  There is obviously some underlying             inflammatory  component to patient's condition.  Again previous blood work confirmed elevated             sed rate and CPR.  Will continue to work on referral to Rheumatology.    Neurological   Gross sensation intact, painful, cyclic neuritis involving tarsal tunnel left side.   Patient's left leg jumps       every time pain shoots through this area    Musculoskeletal   Muscle Strength/Testing and Tone:  Intact, walks well unassisted  Joints, Bones, and Muscles: Limited ROM, tight, stiffness, guarding              Assessment:       Encounter Diagnoses   Name Primary?    Tarsal tunnel syndrome, left lower limb Yes    Edema, lower extremity     Venous insufficiency     Dermatitis          Plan:       Earle was seen today for follow-up.    Diagnoses and all orders for this visit:    Tarsal tunnel syndrome, left lower limb    Edema, lower extremity    Venous insufficiency    Dermatitis    Other orders  -     meloxicam (MOBIC) 15 MG tablet; Take 1 tablet (15 mg total) by mouth once daily.      Advised patient there is definitely inflammatory component regarding the left leg.  No concerns regarding   infection at this time but he needs to monitor closely for any increased warmth, any swelling, weeping.  Advised patient I am in agreement with him and will try to obtain sooner appointment to Rheumatology.    Patient states he could go to [facility] if they have a sooner appointment.  We discussed oral anti-inflammatory, recommended meloxicam.  Patient states Dr. Barrera had him on   this may be a year ago.  Confirmed in his chart his orthopedist had tried a month 7.5 mg of Mobic.  We   reviewed medication and started patient on 15 mg Mobic once daily.  Bilateral Unna boots applied today.  Continue Lyrica 75 mg t.i.d..   Percocet as directed as needed for nerve pain left foot  Patient was in understanding and agreement with treatment plan.  Counseled patient on his conditions, their implications and medical  management.  Instructed patient to contact the office with any changes, questions, concerns, worsening of symptoms.   Patient verbalized understanding.   Total face to face time, exam, assessment, treatment, discussion, documentation 25 minutes, more   than half this time spent on consultation and coordination of care.   Additional time for bilateral Unna boot  applications  Follow up 1/25/2019    This note was created using [employer] voice recognition software that occasionally misinterpreted   phrases or words.

## 2019-01-25 ENCOUNTER — OFFICE VISIT (OUTPATIENT)
Dept: PODIATRY | Facility: CLINIC | Age: 59
End: 2019-01-25
Payer: COMMERCIAL

## 2019-01-25 VITALS
HEART RATE: 67 BPM | TEMPERATURE: 98 F | WEIGHT: 300 LBS | HEIGHT: 69 IN | BODY MASS INDEX: 44.43 KG/M2 | SYSTOLIC BLOOD PRESSURE: 140 MMHG | DIASTOLIC BLOOD PRESSURE: 82 MMHG

## 2019-01-25 DIAGNOSIS — L30.9 DERMATITIS: Primary | ICD-10-CM

## 2019-01-25 DIAGNOSIS — R60.0 EDEMA, LOWER EXTREMITY: ICD-10-CM

## 2019-01-25 DIAGNOSIS — I87.2 VENOUS INSUFFICIENCY: ICD-10-CM

## 2019-01-25 DIAGNOSIS — G57.52 TARSAL TUNNEL SYNDROME, LEFT LOWER LIMB: ICD-10-CM

## 2019-01-25 PROCEDURE — 3008F BODY MASS INDEX DOCD: CPT | Mod: CPTII,S$GLB,, | Performed by: PODIATRIST

## 2019-01-25 PROCEDURE — 99999 PR PBB SHADOW E&M-EST. PATIENT-LVL III: CPT | Mod: PBBFAC,,, | Performed by: PODIATRIST

## 2019-01-25 PROCEDURE — 99213 PR OFFICE/OUTPT VISIT, EST, LEVL III, 20-29 MIN: ICD-10-PCS | Mod: 25,S$GLB,, | Performed by: PODIATRIST

## 2019-01-25 PROCEDURE — 29580 PR STRAPPING UNNA BOOT: ICD-10-PCS | Mod: 51,RT,S$GLB, | Performed by: PODIATRIST

## 2019-01-25 PROCEDURE — 99999 PR PBB SHADOW E&M-EST. PATIENT-LVL III: ICD-10-PCS | Mod: PBBFAC,,, | Performed by: PODIATRIST

## 2019-01-25 PROCEDURE — 99213 OFFICE O/P EST LOW 20 MIN: CPT | Mod: 25,S$GLB,, | Performed by: PODIATRIST

## 2019-01-25 PROCEDURE — 29580 STRAPPING UNNA BOOT: CPT | Mod: LT,S$GLB,, | Performed by: PODIATRIST

## 2019-01-25 PROCEDURE — 3008F PR BODY MASS INDEX (BMI) DOCUMENTED: ICD-10-PCS | Mod: CPTII,S$GLB,, | Performed by: PODIATRIST

## 2019-01-25 RX ORDER — CRISABOROLE 20 MG/G
OINTMENT TOPICAL
COMMUNITY
Start: 2019-01-15 | End: 2019-02-19 | Stop reason: SDUPTHER

## 2019-01-25 RX ORDER — ISOPROPYL ALCOHOL 70 ML/100ML
SWAB TOPICAL
COMMUNITY
Start: 2019-01-22 | End: 2022-02-23

## 2019-01-27 NOTE — PROGRESS NOTES
Subjective:      Patient ID: Earle Hoff is a 58 y.o. male.    Chief Complaint: Follow-up  Patient presents  For follow-up inflammatory dermatitis type rash  Bilateral lower extremities, venous   insufficiency,  Edema, tarsal tunnel pain left. Reports no change since last visit, skin on his legs seem   stable, improved from initial flare up in November 3 months ago, but is not resolving.  Again reports it  feels best to have Unna boots on, coolness of medication and compression helps not only his skin but   his pain as well.  He is taking 15 mg Mobic daily the,  no side effects.  Pain level 5/10.  Patient reports for the 1st time today that his father had same problems with his legs.  He reports he   would get a sore, treat it, would go away and another sore would appear on another area of his legs.    He was never diagnosed with a particular condition causing this.    Relates Dr Summers visit scheduled for 4/8/19, but our nurse got a sooner appointment at [facility]  with Dr. Abrams  2/12/2019.       ROS     Constitutional    Pleasant, obese (BMI 44.30) , no distress, well oriented    Cardiovascular          No chest pain, no shortness of breath    Respiratory          No cough, no congestion     Musculoskeletal         YES arthralgias/joint pain, YES swelling in the extremities    Neurological         YES neuritis/neuropathy    Endocrine         h/o DIABETES well controlled, no reported diabetic medication          Objective:      Physical Exam  Vascular         Arterial Pulses Right: posterior tibialis 1/4, dorsalis pedis 2/4, normal CFT   Arterial Pulses Left: posterior tibialis 1/4, dorsalis pedis 1/4, normal CFT   Mild bilateral lower extremity edema, much improved   Chronic venous insufficiency left greater than right      Integumentary           No change in well demarcated areas of erythematous dermatitis bilateral lower extremity. Skin is             erythematous, smooth, no skin opening or weeping.  Area of venous ulcer medial left foot / ankle             remains well healed. This area has actually improved significantly, minimal calor L>R.            SEE DIGITAL PHOTO UNDER MEDIA    Neurological   Gross sensation intact, painful, cyclic neuritis involving tarsal tunnel left side does not seem to be        occurring as often through visit today      Musculoskeletal   Muscle Strength/Testing and Tone:  Intact, walks well unassisted  Joints, Bones, and Muscles: Limited ROM, tight, stiffness, guarding              Assessment:       Encounter Diagnoses   Name Primary?    Dermatitis Yes    Venous insufficiency     Edema, lower extremity     Tarsal tunnel syndrome, left lower limb          Plan:       Earle was seen today for follow-up.    Diagnoses and all orders for this visit:    Dermatitis    Venous insufficiency    Edema, lower extremity    Tarsal tunnel syndrome, left lower limb      Sooner referral rheumatology   Bilateral Unna boots applied today.  Continue Lyrica 75 mg t.i.d.  Continue Mobic 15mg daily.  Percocet as directed as needed for nerve pain left foot.  Patient was in understanding and agreement with treatment plan.  Counseled patient on his conditions, their implications and medical management.  Instructed patient to contact the office with any changes, questions, concerns, worsening of symptoms.   Patient verbalized understanding.   Total face to face time, exam, assessment, treatment, discussion, documentation 15 minutes, more   than half this time spent on consultation and coordination of care.   Additional time for bilateral Unna boot  application  Follow up 1/29/2019    This note was created using [employer] voice recognition software that occasionally misinterpreted   phrases or words.

## 2019-01-29 ENCOUNTER — OFFICE VISIT (OUTPATIENT)
Dept: PODIATRY | Facility: CLINIC | Age: 59
End: 2019-01-29
Payer: COMMERCIAL

## 2019-01-29 VITALS
BODY MASS INDEX: 44.43 KG/M2 | HEART RATE: 65 BPM | WEIGHT: 300 LBS | RESPIRATION RATE: 18 BRPM | OXYGEN SATURATION: 99 % | HEIGHT: 69 IN | SYSTOLIC BLOOD PRESSURE: 110 MMHG | DIASTOLIC BLOOD PRESSURE: 71 MMHG | TEMPERATURE: 98 F

## 2019-01-29 DIAGNOSIS — L30.9 DERMATITIS: ICD-10-CM

## 2019-01-29 DIAGNOSIS — G57.52 TARSAL TUNNEL SYNDROME, LEFT LOWER LIMB: ICD-10-CM

## 2019-01-29 DIAGNOSIS — R60.0 EDEMA, LOWER EXTREMITY: ICD-10-CM

## 2019-01-29 DIAGNOSIS — I87.2 VENOUS INSUFFICIENCY: ICD-10-CM

## 2019-01-29 PROCEDURE — 3008F BODY MASS INDEX DOCD: CPT | Mod: CPTII,S$GLB,, | Performed by: PODIATRIST

## 2019-01-29 PROCEDURE — 99213 OFFICE O/P EST LOW 20 MIN: CPT | Mod: 25,S$GLB,, | Performed by: PODIATRIST

## 2019-01-29 PROCEDURE — 99999 PR PBB SHADOW E&M-EST. PATIENT-LVL IV: CPT | Mod: PBBFAC,,, | Performed by: PODIATRIST

## 2019-01-29 PROCEDURE — 3008F PR BODY MASS INDEX (BMI) DOCUMENTED: ICD-10-PCS | Mod: CPTII,S$GLB,, | Performed by: PODIATRIST

## 2019-01-29 PROCEDURE — 99213 PR OFFICE/OUTPT VISIT, EST, LEVL III, 20-29 MIN: ICD-10-PCS | Mod: 25,S$GLB,, | Performed by: PODIATRIST

## 2019-01-29 PROCEDURE — 29580 PR STRAPPING UNNA BOOT: ICD-10-PCS | Mod: RT,S$GLB,, | Performed by: PODIATRIST

## 2019-01-29 PROCEDURE — 29580 STRAPPING UNNA BOOT: CPT | Mod: RT,S$GLB,, | Performed by: PODIATRIST

## 2019-01-29 PROCEDURE — 99999 PR PBB SHADOW E&M-EST. PATIENT-LVL IV: ICD-10-PCS | Mod: PBBFAC,,, | Performed by: PODIATRIST

## 2019-01-30 NOTE — PROGRESS NOTES
Subjective:      Patient ID: Earle Hoff is a 58 y.o. male.    Chief Complaint: Follow-up  Patient presents for follow-up  venous insufficiency, edema, dermatitis,  tarsal tunnel pain left side.   Reports no change, pain improved since he is not working, better in the morning before he is on   is feet a lot.  Feels rash on his legs is getting better, but eager to follow up with Rheumatology for   possible cause. Has appointment at main [facility] with Dr. Abrams  2/12/2019. He took Unna boots   off on Sunday, has noticed some cracking on the back of both heels.  Just came from Dr. Ray   office this morning for blood work.  Pain level in his feet and legs today is 2/10        ROS     Constitutional    Pleasant, obese (BMI 44.30) , no distress, well oriented    Cardiovascular          No chest pain, no shortness of breath    Respiratory          No cough, no congestion     Musculoskeletal         YES arthralgias/joint pain, YES swelling in the extremities    Neurological         YES neuritis/neuropathy    Endocrine         h/o DIABETES well controlled, no reported diabetic medication          Objective:      Physical Exam  Vascular         Arterial Pulses Right: posterior tibialis 1/4, dorsalis pedis 2/4, normal CFT   Arterial Pulses Left: posterior tibialis 1/4, dorsalis pedis 1/4, normal CFT   Mild bilateral lower extremity edema, much improved   Chronic venous insufficiency left greater than right      Integumentary          No change in erythematous dermatitis bilateral lower extremity from feet to below knees. No                 weeping.  Are some superficial crack/fissures with evidence of bleeding on the medial                 posterior aspect of both heels.  No pain or evidence of infection in this area.         Minimal calor unchanged L>R lower extremity.         SEE DIGITAL PHOTO UNDER MEDIA    Neurological   Gross sensation intact, mild tarsal tunnel left side this morning     Musculoskeletal   Muscle  Strength/Testing and Tone:  Intact, walks well unassisted  Joints, Bones, and Muscles: Limited ROM, tight, stiffness, guarding              Assessment:       Encounter Diagnoses   Name Primary?    Venous insufficiency     Dermatitis     Tarsal tunnel syndrome, left lower limb     Edema, lower extremity          Plan:       Earle was seen today for follow-up.    Diagnoses and all orders for this visit:    Venous insufficiency    Dermatitis    Tarsal tunnel syndrome, left lower limb    Edema, lower extremity      Rheumatology evaluation 2/12  Bilateral Unna boots applied today.   Patient was instructed to leave these intact until  Thursday evening   or Friday morning to prevent further cracking of the skin on the heels.  He is instructed to remove the   Unna boots if pain in this area and contact the office immediately with any changes  Continue Lyrica 75 mg t.i.d.  Continue Mobic 15mg daily  Patient was in understanding and agreement with treatment plan.  Counseled patient on his conditions, their implications and medical management.  Instructed patient to contact the office with any, questions, concerns, worsening of symptoms.   Patient verbalized understanding.   Total face to face time, exam, assessment, treatment, discussion, documentation 15 minutes, more   than half this time spent on consultation and coordination of care.   Additional time for bilateral Unna boot  application  Follow up 2/1/2019    This note was created using [employer] voice recognition software that occasionally misinterpreted   phrases or words.

## 2019-02-01 ENCOUNTER — OFFICE VISIT (OUTPATIENT)
Dept: PODIATRY | Facility: CLINIC | Age: 59
End: 2019-02-01
Payer: COMMERCIAL

## 2019-02-01 VITALS
TEMPERATURE: 98 F | HEART RATE: 65 BPM | BODY MASS INDEX: 44.43 KG/M2 | WEIGHT: 300 LBS | DIASTOLIC BLOOD PRESSURE: 63 MMHG | HEIGHT: 69 IN | SYSTOLIC BLOOD PRESSURE: 112 MMHG

## 2019-02-01 DIAGNOSIS — G57.52 TARSAL TUNNEL SYNDROME, LEFT LOWER LIMB: ICD-10-CM

## 2019-02-01 DIAGNOSIS — I87.2 VENOUS INSUFFICIENCY: Primary | ICD-10-CM

## 2019-02-01 DIAGNOSIS — R60.0 EDEMA, LOWER EXTREMITY: ICD-10-CM

## 2019-02-01 DIAGNOSIS — L30.9 DERMATITIS: ICD-10-CM

## 2019-02-01 PROCEDURE — 99999 PR PBB SHADOW E&M-EST. PATIENT-LVL III: ICD-10-PCS | Mod: PBBFAC,,, | Performed by: PODIATRIST

## 2019-02-01 PROCEDURE — 99999 PR PBB SHADOW E&M-EST. PATIENT-LVL III: CPT | Mod: PBBFAC,,, | Performed by: PODIATRIST

## 2019-02-01 PROCEDURE — 3008F BODY MASS INDEX DOCD: CPT | Mod: CPTII,S$GLB,, | Performed by: PODIATRIST

## 2019-02-01 PROCEDURE — 29580 STRAPPING UNNA BOOT: CPT | Mod: RT,S$GLB,, | Performed by: PODIATRIST

## 2019-02-01 PROCEDURE — 3008F PR BODY MASS INDEX (BMI) DOCUMENTED: ICD-10-PCS | Mod: CPTII,S$GLB,, | Performed by: PODIATRIST

## 2019-02-01 PROCEDURE — 99213 OFFICE O/P EST LOW 20 MIN: CPT | Mod: 25,S$GLB,, | Performed by: PODIATRIST

## 2019-02-01 PROCEDURE — 99213 PR OFFICE/OUTPT VISIT, EST, LEVL III, 20-29 MIN: ICD-10-PCS | Mod: 25,S$GLB,, | Performed by: PODIATRIST

## 2019-02-01 PROCEDURE — 29580 PR STRAPPING UNNA BOOT: ICD-10-PCS | Mod: RT,S$GLB,, | Performed by: PODIATRIST

## 2019-02-04 NOTE — PROGRESS NOTES
Subjective:      Patient ID: Earle Hoff is a 58 y.o. male.    Chief Complaint: Follow-up and Diabetes Mellitus  Patient presents for follow-up  venous insufficiency, edema, dermatitis,  tarsal tunnel pain left side.   Patient reports significant improvement in condition of skin and pain down considerably on the left   side, none on the right side.   We have been applying Unna boots twice a week for about the last   4 weeks.  Confirms he is taking Mobic daily, no side effects.      ROS     Constitutional    Pleasant, obese (BMI 44.30) , no distress, well oriented    Cardiovascular          No chest pain, no shortness of breath    Respiratory          No cough, no congestion     Musculoskeletal         YES arthralgias/joint pain, YES swelling in the extremities    Neurological         YES neuritis/neuropathy    Endocrine         h/o DIABETES well controlled, no reported diabetic medication          Objective:      Physical Exam  Vascular         Arterial Pulses Right: posterior tibialis 1/4, dorsalis pedis 2/4, normal CFT   Arterial Pulses Left: posterior tibialis 1/4, dorsalis pedis 1/4, normal CFT   Minimal lower extremity edema bilateral    Chronic history venous insufficiency left greater than right     Integumentary         Notable significant improvement condition of skin compared to previous visit.  Well demarcated              lines of light erythematous, dry skin dermatitis are almost resolved and skin  Dorsal feet,              bilateral lower legs, especially the anterior aspect is  Almost normal skin color.  Small crack/             fissures posterior medial heels are resolved with the exception of 1 area on the right.        No calor bilateral feet/lower extremity         SEE DIGITAL PHOTO UNDER MEDIA    Neurological   Gross sensation intact,  There is some discomfort palpating tarsal tunnel left side today, but no       cyclic, radiating pain      Musculoskeletal   Muscle Strength/Testing and Tone:   Intact, walks well unassisted  Joints, Bones, and Muscles: Better ROM, tight              Assessment:       Encounter Diagnoses   Name Primary?    Venous insufficiency Yes    Dermatitis     Tarsal tunnel syndrome, left lower limb     Edema, lower extremity          Plan:       Earle was seen today for follow-up and diabetes mellitus.    Diagnoses and all orders for this visit:    Venous insufficiency    Dermatitis    Tarsal tunnel syndrome, left lower limb    Edema, lower extremity        Bilateral Unna boots applied today.     Patient instructed to leave these intact until Monday night, Tuesday morning,  Advised patient I would continue with the same twice weekly treatment since we have had   considerable results of the last 2 weeks and his pain level is almost resolved on the left lower   extremity.  Hopefully rheumatology will be able to better diagnosis cause of inflammation to   prevent recurrence.   Advised patient knee only other change other than the Unna boots as Mobic he has been taking   the last 2 weeks which may be helping inflammation, still does confirm the cause.  Pending Rheumatology  referral 2/12  Can decrease Lyrica 75 mg  to twice a day rather than 3 times  Continue Mobic 15mg daily  Patient was in understanding and agreement with treatment plan.  Counseled patient on his conditions, their implications and medical management.  Instructed patient to contact the office with any, questions, concerns, worsening of symptoms.   Patient verbalized understanding.   Total face to face time, exam, assessment, treatment, discussion, documentation 15 minutes,   more than half this time spent on consultation and coordination of care.   Additional time for   bilateral Unna boot application  Follow up 2/5/2019    This note was created using [employer] voice recognition software that occasionally misinterpreted   phrases or words.

## 2019-02-05 ENCOUNTER — OFFICE VISIT (OUTPATIENT)
Dept: PODIATRY | Facility: CLINIC | Age: 59
End: 2019-02-05
Payer: COMMERCIAL

## 2019-02-05 VITALS
RESPIRATION RATE: 18 BRPM | HEART RATE: 63 BPM | HEIGHT: 66 IN | WEIGHT: 303 LBS | TEMPERATURE: 98 F | BODY MASS INDEX: 48.7 KG/M2 | DIASTOLIC BLOOD PRESSURE: 70 MMHG | OXYGEN SATURATION: 99 % | SYSTOLIC BLOOD PRESSURE: 113 MMHG

## 2019-02-05 DIAGNOSIS — L30.9 DERMATITIS: ICD-10-CM

## 2019-02-05 DIAGNOSIS — R60.0 EDEMA, LOWER EXTREMITY: ICD-10-CM

## 2019-02-05 DIAGNOSIS — I87.2 VENOUS INSUFFICIENCY: ICD-10-CM

## 2019-02-05 DIAGNOSIS — G57.52 TARSAL TUNNEL SYNDROME, LEFT LOWER LIMB: ICD-10-CM

## 2019-02-05 PROCEDURE — 29580 PR STRAPPING UNNA BOOT: ICD-10-PCS | Mod: 51,LT,S$GLB, | Performed by: PODIATRIST

## 2019-02-05 PROCEDURE — 3008F BODY MASS INDEX DOCD: CPT | Mod: CPTII,S$GLB,, | Performed by: PODIATRIST

## 2019-02-05 PROCEDURE — 29580 STRAPPING UNNA BOOT: CPT | Mod: RT,S$GLB,, | Performed by: PODIATRIST

## 2019-02-05 PROCEDURE — 99999 PR PBB SHADOW E&M-EST. PATIENT-LVL III: CPT | Mod: PBBFAC,,, | Performed by: PODIATRIST

## 2019-02-05 PROCEDURE — 99213 OFFICE O/P EST LOW 20 MIN: CPT | Mod: 25,S$GLB,, | Performed by: PODIATRIST

## 2019-02-05 PROCEDURE — 3008F PR BODY MASS INDEX (BMI) DOCUMENTED: ICD-10-PCS | Mod: CPTII,S$GLB,, | Performed by: PODIATRIST

## 2019-02-05 PROCEDURE — 99999 PR PBB SHADOW E&M-EST. PATIENT-LVL III: ICD-10-PCS | Mod: PBBFAC,,, | Performed by: PODIATRIST

## 2019-02-05 PROCEDURE — 99213 PR OFFICE/OUTPT VISIT, EST, LEVL III, 20-29 MIN: ICD-10-PCS | Mod: 25,S$GLB,, | Performed by: PODIATRIST

## 2019-02-06 NOTE — PROGRESS NOTES
Subjective:      Patient ID: Earle Hoff is a 58 y.o. male.    Chief Complaint: Follow-up  Patient presents for follow-up  venous insufficiency, edema, dermatitis,  tarsal tunnel pain left side.   Patient reports significant improvement in condition of skin and pain down considerably on the left   side, none on the right side.   We have been applying Unna boots twice a week for about the last   4 weeks.  Confirms he is taking Mobic daily, no side effects.      ROS     Constitutional    Pleasant, obese (BMI 44.30) , no distress, well oriented    Cardiovascular          No chest pain, no shortness of breath    Respiratory          No cough, no congestion     Musculoskeletal         YES arthralgias/joint pain, YES swelling in the extremities    Neurological         YES neuritis/neuropathy    Endocrine         h/o DIABETES well controlled, no reported diabetic medication          Objective:      Physical Exam  Vascular         Arterial Pulses Right: posterior tibialis 1/4, dorsalis pedis 2/4, normal CFT   Arterial Pulses Left: posterior tibialis 1/4, dorsalis pedis 1/4, normal CFT   Minimal lower extremity edema bilateral    Chronic history venous insufficiency left greater than right     Integumentary         Continued improvement condition of dry skin dermatitis. Dorsal feet, bilateral lower legs. Small             fissure posterior medial right heel almost closed.        No calor bilateral feet/lower extremity        SEE DIGITAL PHOTO UNDER MEDIA    Neurological   No discomfort palpating tarsal tunnel left      Musculoskeletal   Muscle Strength/Testing and Tone:  Intact, walks well unassisted  Joints, Bones, and Muscles: Improving ROM              Assessment:       Encounter Diagnoses   Name Primary?    Tarsal tunnel syndrome, left lower limb     Venous insufficiency     Dermatitis     Edema, lower extremity          Plan:       Earle was seen today for follow-up.    Diagnoses and all orders for this  visit:    Tarsal tunnel syndrome, left lower limb    Venous insufficiency    Dermatitis    Edema, lower extremity        Bilateral Unna boots applied today.     Patient instructed to leave these intact until Thursday night, Friday morning.  Advised patient is in for condition have his skin has improved significantly with control of edema   would recommend this Friday are would be our last application.  Instructed patient following treatment with Unna boot compression dressings he needs to utilize   compression hose daily, no matter what his activity.  Rheumatology visit next week  2/12  Advised patient with symptoms controlled, little to no discomfort regarding tarsal tunnel pain left   instructed to start decreasing Lyrica 75 mg to twice a day.  Advised patient I feel Mobic over the last 2-3 weeks has made a significant difference in reduction   in inflammation,continue Mobic 15mg daily  Patient was in understanding and agreement with treatment plan.  Counseled patient on his conditions, their implications and medical management.  Instructed patient to contact the office with any, questions, concerns, worsening of symptoms.   Patient verbalized understanding.   Total face to face time, exam, assessment, treatment, discussion, documentation 15 minutes,   more than half this time spent on consultation and coordination of care.   Additional time for   bilateral Unna boot application  Follow up 2/9/2019    This note was created using [employer] voice recognition software that occasionally misinterpreted   phrases or words.

## 2019-02-08 ENCOUNTER — OFFICE VISIT (OUTPATIENT)
Dept: PODIATRY | Facility: CLINIC | Age: 59
End: 2019-02-08
Payer: COMMERCIAL

## 2019-02-08 VITALS
WEIGHT: 300 LBS | TEMPERATURE: 98 F | HEART RATE: 77 BPM | SYSTOLIC BLOOD PRESSURE: 141 MMHG | BODY MASS INDEX: 44.43 KG/M2 | DIASTOLIC BLOOD PRESSURE: 73 MMHG | HEIGHT: 69 IN

## 2019-02-08 DIAGNOSIS — G57.52 TARSAL TUNNEL SYNDROME, LEFT LOWER LIMB: ICD-10-CM

## 2019-02-08 DIAGNOSIS — R60.0 EDEMA, LOWER EXTREMITY: ICD-10-CM

## 2019-02-08 DIAGNOSIS — I87.2 VENOUS INSUFFICIENCY: ICD-10-CM

## 2019-02-08 DIAGNOSIS — L30.9 DERMATITIS: ICD-10-CM

## 2019-02-08 DIAGNOSIS — S80.811A ABRASION OF ANTERIOR RIGHT LOWER LEG, INITIAL ENCOUNTER: ICD-10-CM

## 2019-02-08 PROCEDURE — 29580 STRAPPING UNNA BOOT: CPT | Mod: LT,S$GLB,, | Performed by: PODIATRIST

## 2019-02-08 PROCEDURE — 29580 PR STRAPPING UNNA BOOT: ICD-10-PCS | Mod: 51,RT,S$GLB, | Performed by: PODIATRIST

## 2019-02-08 PROCEDURE — 99999 PR PBB SHADOW E&M-EST. PATIENT-LVL III: CPT | Mod: PBBFAC,,, | Performed by: PODIATRIST

## 2019-02-08 PROCEDURE — 3008F PR BODY MASS INDEX (BMI) DOCUMENTED: ICD-10-PCS | Mod: CPTII,S$GLB,, | Performed by: PODIATRIST

## 2019-02-08 PROCEDURE — 3008F BODY MASS INDEX DOCD: CPT | Mod: CPTII,S$GLB,, | Performed by: PODIATRIST

## 2019-02-08 PROCEDURE — 99213 PR OFFICE/OUTPT VISIT, EST, LEVL III, 20-29 MIN: ICD-10-PCS | Mod: 25,S$GLB,, | Performed by: PODIATRIST

## 2019-02-08 PROCEDURE — 99999 PR PBB SHADOW E&M-EST. PATIENT-LVL III: ICD-10-PCS | Mod: PBBFAC,,, | Performed by: PODIATRIST

## 2019-02-08 PROCEDURE — 99213 OFFICE O/P EST LOW 20 MIN: CPT | Mod: 25,S$GLB,, | Performed by: PODIATRIST

## 2019-02-12 ENCOUNTER — LAB VISIT (OUTPATIENT)
Dept: LAB | Facility: HOSPITAL | Age: 59
End: 2019-02-12
Attending: INTERNAL MEDICINE
Payer: COMMERCIAL

## 2019-02-12 ENCOUNTER — OFFICE VISIT (OUTPATIENT)
Dept: RHEUMATOLOGY | Facility: CLINIC | Age: 59
End: 2019-02-12
Payer: COMMERCIAL

## 2019-02-12 VITALS
BODY MASS INDEX: 46.65 KG/M2 | HEIGHT: 69 IN | SYSTOLIC BLOOD PRESSURE: 118 MMHG | HEART RATE: 82 BPM | WEIGHT: 315 LBS | DIASTOLIC BLOOD PRESSURE: 75 MMHG

## 2019-02-12 DIAGNOSIS — R21 RASH: Primary | ICD-10-CM

## 2019-02-12 DIAGNOSIS — R21 RASH: ICD-10-CM

## 2019-02-12 LAB
BILIRUB UR QL STRIP: NEGATIVE
CLARITY UR REFRACT.AUTO: CLEAR
COLOR UR AUTO: YELLOW
CREAT UR-MCNC: 173 MG/DL
GLUCOSE UR QL STRIP: NEGATIVE
HGB UR QL STRIP: NEGATIVE
KETONES UR QL STRIP: NEGATIVE
LEUKOCYTE ESTERASE UR QL STRIP: NEGATIVE
NITRITE UR QL STRIP: NEGATIVE
PH UR STRIP: 5 [PH] (ref 5–8)
PROT UR QL STRIP: NEGATIVE
PROT UR-MCNC: 8 MG/DL
PROT/CREAT UR: 0.05 MG/G{CREAT}
SP GR UR STRIP: 1.02 (ref 1–1.03)
URN SPEC COLLECT METH UR: NORMAL

## 2019-02-12 PROCEDURE — 81003 URINALYSIS AUTO W/O SCOPE: CPT

## 2019-02-12 PROCEDURE — 84156 ASSAY OF PROTEIN URINE: CPT

## 2019-02-12 PROCEDURE — 99999 PR PBB SHADOW E&M-EST. PATIENT-LVL IV: ICD-10-PCS | Mod: PBBFAC,,, | Performed by: INTERNAL MEDICINE

## 2019-02-12 PROCEDURE — 99204 PR OFFICE/OUTPT VISIT, NEW, LEVL IV, 45-59 MIN: ICD-10-PCS | Mod: S$GLB,,, | Performed by: INTERNAL MEDICINE

## 2019-02-12 PROCEDURE — 99999 PR PBB SHADOW E&M-EST. PATIENT-LVL IV: CPT | Mod: PBBFAC,,, | Performed by: INTERNAL MEDICINE

## 2019-02-12 PROCEDURE — 3008F BODY MASS INDEX DOCD: CPT | Mod: CPTII,S$GLB,, | Performed by: INTERNAL MEDICINE

## 2019-02-12 PROCEDURE — 3008F PR BODY MASS INDEX (BMI) DOCUMENTED: ICD-10-PCS | Mod: CPTII,S$GLB,, | Performed by: INTERNAL MEDICINE

## 2019-02-12 PROCEDURE — 99204 OFFICE O/P NEW MOD 45 MIN: CPT | Mod: S$GLB,,, | Performed by: INTERNAL MEDICINE

## 2019-02-12 NOTE — PROGRESS NOTES
Subjective:       Patient ID: Earle Hoff is a 58 y.o. male.    Chief Complaint: No chief complaint on file.    HPI   58 year old male with DM II,gout here for evaluation.  Reports that 2 years ago, he noticed he developed lower extremity lesions.   Reports standing his feet makes it worse.  He has lesions in arms and right side of abdomen and right hand. The lesions in legs drain clear and serous fluid.  He feels that his legs get swollen and that will make the rash worse. He reports he will start off with blisters and then they pop.   He has seen vascular doctor and was told to not have blockages.    He has seen dermatologists. He saw 2 dermatologists and had one biopsy.  Resting his feet improves the pain.   He reports that his feels thin and sometimes rubbery.  Denies lesions of scalp or oral ulcers.  Denies fevers, raynauds, photosensitivity, pleurisy, blood clots, or photosensitivity.        Past Medical History:   Diagnosis Date    Arthritis     Edema, lower extremity     Gout     Stasis ulcer of lower extremity, left        Review of Systems   Constitutional: Negative for activity change, appetite change, chills, diaphoresis, fatigue and fever.   HENT: Negative for ear discharge, ear pain, hearing loss, mouth sores, nosebleeds and sinus pressure.    Eyes: Negative.  Negative for photophobia, pain, discharge, redness and itching.   Respiratory: Negative for cough, chest tightness and shortness of breath.    Cardiovascular: Negative for chest pain, palpitations and leg swelling.   Gastrointestinal: Negative for abdominal distention, blood in stool and nausea.   Endocrine: Negative for cold intolerance, heat intolerance, polydipsia and polyphagia.   Genitourinary: Negative for flank pain, genital sores and hematuria.   Musculoskeletal: Positive for arthralgias and joint swelling. Negative for back pain, gait problem, myalgias, neck pain and neck stiffness.   Skin: Negative for color change, pallor and  rash.   Neurological: Negative for dizziness, weakness, light-headedness and headaches.   Hematological: Negative for adenopathy. Does not bruise/bleed easily.   Psychiatric/Behavioral: Negative for decreased concentration and hallucinations. The patient is not nervous/anxious.               Objective:   There were no vitals taken for this visit.     Physical Exam   Constitutional: He is oriented to person, place, and time and well-developed, well-nourished, and in no distress. No distress.   HENT:   Head: Normocephalic and atraumatic.   Right Ear: External ear normal.   Left Ear: External ear normal.   Nose: Nose normal.   Mouth/Throat: Oropharynx is clear and moist.   Eyes: Conjunctivae and EOM are normal. Pupils are equal, round, and reactive to light. Right eye exhibits no discharge. Left eye exhibits no discharge. No scleral icterus.   Neck: Normal range of motion. Neck supple. No JVD present. No tracheal deviation present. No thyromegaly present.   Cardiovascular: Normal rate, regular rhythm, normal heart sounds and intact distal pulses.  Exam reveals no gallop and no friction rub.    No murmur heard.  Pulmonary/Chest: Effort normal and breath sounds normal. No stridor. No respiratory distress. He has no wheezes. He has no rales. He exhibits no tenderness.   Abdominal: Soft. Bowel sounds are normal. He exhibits no distension and no mass. There is no tenderness. There is no rebound and no guarding.   Lymphadenopathy:     He has no cervical adenopathy.   Neurological: He is alert and oriented to person, place, and time.   Skin: Skin is warm. Rash noted. He is not diaphoretic. There is erythema.     +3 pitting edema with blistering lesions and chronic venous stasis changes.   Musculoskeletal: He exhibits no edema, tenderness or deformity.         labs: reviewed    Assessment:      58 year old male with DM II,gout here for evaluation.  He reports here for evaluation of rash.  I told patient that based on my exam,  his rash is not consistent with connective tissue disease.  I told him he needs fluid removal and I will send him to dermatology here for consideration of biopsy.  No diagnosis found.        Plan:   **    Labs  Dermatology referral  Leg elevation  Encourage weight loss  rtc prn

## 2019-02-13 ENCOUNTER — TELEPHONE (OUTPATIENT)
Dept: DERMATOLOGY | Facility: CLINIC | Age: 59
End: 2019-02-13

## 2019-02-13 ENCOUNTER — TELEPHONE (OUTPATIENT)
Dept: PODIATRY | Facility: CLINIC | Age: 59
End: 2019-02-13

## 2019-02-13 ENCOUNTER — INITIAL CONSULT (OUTPATIENT)
Dept: DERMATOLOGY | Facility: CLINIC | Age: 59
End: 2019-02-13
Payer: COMMERCIAL

## 2019-02-13 DIAGNOSIS — I87.2 CHRONIC VENOUS STASIS DERMATITIS: Primary | ICD-10-CM

## 2019-02-13 PROCEDURE — 99999 PR PBB SHADOW E&M-EST. PATIENT-LVL III: ICD-10-PCS | Mod: PBBFAC,,, | Performed by: STUDENT IN AN ORGANIZED HEALTH CARE EDUCATION/TRAINING PROGRAM

## 2019-02-13 PROCEDURE — 99202 OFFICE O/P NEW SF 15 MIN: CPT | Mod: S$GLB,,, | Performed by: STUDENT IN AN ORGANIZED HEALTH CARE EDUCATION/TRAINING PROGRAM

## 2019-02-13 PROCEDURE — 99202 PR OFFICE/OUTPT VISIT, NEW, LEVL II, 15-29 MIN: ICD-10-PCS | Mod: S$GLB,,, | Performed by: STUDENT IN AN ORGANIZED HEALTH CARE EDUCATION/TRAINING PROGRAM

## 2019-02-13 PROCEDURE — 99999 PR PBB SHADOW E&M-EST. PATIENT-LVL III: CPT | Mod: PBBFAC,,, | Performed by: STUDENT IN AN ORGANIZED HEALTH CARE EDUCATION/TRAINING PROGRAM

## 2019-02-13 NOTE — TELEPHONE ENCOUNTER
----- Message from Dina Holguin sent at 2/13/2019  9:05 AM CST -----  Contact: self   Pt was seen in office for 7 am this morning.he was told to go to [facility] and they will provide him socks. They are telling him a order is required.Patient is currently at the facility.please fax to [phone].Please call back asap at [phone].      Thanks,  Dina Holguin

## 2019-02-13 NOTE — TELEPHONE ENCOUNTER
----- Message from Chris Martinez sent at 2/13/2019  9:32 AM CST -----  Type: Needs Medical Advice    Who Called:  Self   Symptoms (please be specific): NA   How long has patient had these symptoms:  [facility] name [facility] phone #:  [facility] Call Back Number: [phone] Information: Patient is requesting an order for diabetic socks. Please fax the order-[facility]- [phone]. Patient is currently at the store.

## 2019-02-13 NOTE — LETTER
February 13, 2019      Tari Abrams MD  [address]  [address]  [facility] [address]           [facility]  [address] [facility] [address]  Phone: [phone]  Fax: [phone]          Patient: Earle Hoff   MR Number: [id number]   YOB: 1960   Date of Visit: 2/13/2019       Dear Dr. Tari Abrams:    Thank you for referring Earle Hoff to me for evaluation. Attached you will find relevant portions of my assessment and plan of care.    If you have questions, please do not hesitate to call me. I look forward to following Earle Hoff along with you.    Sincerely,    Howard Silveira MD    Enclosure  CC:  No Recipients    If you would like to receive this communication electronically, please contact [email or web address] or [phone] to request more information on [employer] access.    For providers and/or their staff who would like to refer a patient to [facility], please contact us through our one-stop-shop provider referral line, [facility], at [phone].    If you feel you have received this communication in error or would no longer like to receive these types of communications, please e-mail [email or web address]

## 2019-02-13 NOTE — TELEPHONE ENCOUNTER
Pt at [facility] in [place] to get compression stockings. Per last visit pt is to use compression stockings after brice boot comes off. He was measured and verbal was given for pt to get compression stockings. Order for provider's signature will be faxed.

## 2019-02-13 NOTE — PATIENT INSTRUCTIONS
Understanding Chronic Venous Insufficiency  Problems with the veins in the legs may lead to chronic venous insufficiency (CVI). CVI means that there is a long-term problem with the veins not being able to pump blood back to your heart. When this happens, blood stays in the legs and causes swelling and aching.   Two problems that may lead to chronic venous insufficiency are:  · Damaged valves. Valves keep blood flowing from the legs through the blood vessels and back to the heart. When the valves are damaged, blood does not flow as well.   · Deep vein thrombosis (DVT). Blood clots may form in the deep veins of the legs. This may cause pain, redness, and swelling in the legs. It may also block the flow of blood back to the heart. Seek immediate medical care if you have these symptoms.  · A blood clot in the leg can also break off and travel to the lungs. This is called pulmonary embolism (PE). In the lungs, the clot can cut off the flow of blood. This may cause chest pain, trouble breathing, sweating, a fast heartbeat, coughing (may cough up blood), and fainting. It is a medical emergency and may cause death. Call 911 if you have these symptoms.  · Healthcare providers call the two conditions, DVT and PE, venous thromboembolism (VTE).  CVI cant be cured, but you can control leg swelling to reduce the likelihood of ulcers (sores).  Recognizing the symptoms  Be aware of the following:  · If you stand or sit with your feet down for long periods, your legs may ache or feel heavy.  · Swollen ankles are possibly the most common symptom of CVI.  · As swelling increases, the skin over your ankles may show red spots or a brownish tinge. The skin may feel leathery or scaly, and may start to itch.  · If swelling is not controlled, an ulcer (open wound) may form.  What you can do  Reduce your risk of developing ulcers by doing the following:  · Increase blood flow back to your heart by elevating your legs, exercising daily,  and wearing elastic stockings.  · Boost blood flow in your legs by losing excess weight.  · If you must stand or sit in one place for a period of time, keep your blood moving by wiggling your toes, shifting your body position, and rising up on the balls of your feet.    Date Last Reviewed: 5/1/2016  © 7379-6418 [employer]. [address] [place] [address], [place] [address]. All rights reserved. This information is not intended as a substitute for professional medical care. Always follow your healthcare professional's instructions.

## 2019-02-13 NOTE — PROGRESS NOTES
Subjective:       Patient ID:  Earle Hoff is a 58 y.o. male who presents for   Chief Complaint   Patient presents with    Dry Skin     c/o dry skin red bumps to legs arms and stomach x 2 years tx kenalog oint eucrisa      History of Present Illness: The patient presents with chief complaint of rash and swelling of the legs.   Location: bilateral lower legs   Duration:  2 years   Signs/Symptoms: red bumps, dry skin, ulcers   Prior treatments: kenalog cream, eucrisa     He reports that for the past 2 years, he has been having increasing swelling and discoloration and ulcers on the lower legs. He had an ulcer on the left medial ankle that was opened up but has largely healed. He reports having pain when standing on his legs for prolonged periods of time and worsening symptoms as the day progresses. He wears unna boots that overall help with his symptoms of redness and swelling, however, once he takes them off, his symptoms return.           Review of Systems   Skin: Positive for itching and rash.        Objective:    Physical Exam   Constitutional: He appears well-developed and well-nourished. No distress.   Neurological: He is alert and oriented to person, place, and time. He is not disoriented.   Psychiatric: He has a normal mood and affect.   Skin:   Areas Examined (abnormalities noted in diagram):   Head / Face Inspection Performed  RLE Inspected  LLE Inspection Performed              Diagram Legend     Erythematous scaling macule/papule c/w actinic keratosis       Vascular papule c/w angioma      Pigmented verrucoid papule/plaque c/w seborrheic keratosis      Yellow umbilicated papule c/w sebaceous hyperplasia      Irregularly shaped tan macule c/w lentigo     1-2 mm smooth white papules consistent with Milia      Movable subcutaneous cyst with punctum c/w epidermal inclusion cyst      Subcutaneous movable cyst c/w pilar cyst      Firm pink to brown papule c/w dermatofibroma      Pedunculated fleshy  papule(s) c/w skin tag(s)      Evenly pigmented macule c/w junctional nevus     Mildly variegated pigmented, slightly irregular-bordered macule c/w mildly atypical nevus      Flesh colored to evenly pigmented papule c/w intradermal nevus       Pink pearly papule/plaque c/w basal cell carcinoma      Erythematous hyperkeratotic cursted plaque c/w SCC      Surgical scar with no sign of skin cancer recurrence      Open and closed comedones      Inflammatory papules and pustules      Verrucoid papule consistent consistent with wart     Erythematous eczematous patches and plaques     Dystrophic onycholytic nail with subungual debris c/w onychomycosis     Umbilicated papule    Erythematous-base heme-crusted tan verrucoid plaque consistent with inflamed seborrheic keratosis     Erythematous Silvery Scaling Plaque c/w Psoriasis     See annotation      Assessment / Plan:        Chronic venous stasis dermatitis - discussed etiology of disease with patient. Discussed that he needs to have compression stockings fitted, as well as leg elevation and needs to remove fluid from his legs. He is to speak to his PCP regarding his Lasix medication which he only takes 3 times weekly. Will refer patient over to vascular/cardiology for further treatment and recommendations.   -     Ambulatory referral to Vascular Cardiology       Follow-up in about 3 months (around 5/13/2019).

## 2019-02-14 ENCOUNTER — OFFICE VISIT (OUTPATIENT)
Dept: PODIATRY | Facility: CLINIC | Age: 59
End: 2019-02-14
Payer: COMMERCIAL

## 2019-02-14 VITALS
TEMPERATURE: 98 F | HEIGHT: 69 IN | SYSTOLIC BLOOD PRESSURE: 132 MMHG | HEART RATE: 70 BPM | DIASTOLIC BLOOD PRESSURE: 84 MMHG | RESPIRATION RATE: 18 BRPM | OXYGEN SATURATION: 99 % | BODY MASS INDEX: 46.65 KG/M2 | WEIGHT: 315 LBS

## 2019-02-14 DIAGNOSIS — G57.52 TARSAL TUNNEL SYNDROME, LEFT LOWER LIMB: ICD-10-CM

## 2019-02-14 DIAGNOSIS — S80.811D: Primary | ICD-10-CM

## 2019-02-14 DIAGNOSIS — R60.0 EDEMA, LOWER EXTREMITY: ICD-10-CM

## 2019-02-14 DIAGNOSIS — I87.2 VENOUS INSUFFICIENCY OF BOTH LOWER EXTREMITIES: ICD-10-CM

## 2019-02-14 PROCEDURE — 99213 OFFICE O/P EST LOW 20 MIN: CPT | Mod: 25,S$GLB,, | Performed by: PODIATRIST

## 2019-02-14 PROCEDURE — 29580 STRAPPING UNNA BOOT: CPT | Mod: 51,RT,S$GLB, | Performed by: PODIATRIST

## 2019-02-14 PROCEDURE — 99213 PR OFFICE/OUTPT VISIT, EST, LEVL III, 20-29 MIN: ICD-10-PCS | Mod: 25,S$GLB,, | Performed by: PODIATRIST

## 2019-02-14 PROCEDURE — 99999 PR PBB SHADOW E&M-EST. PATIENT-LVL III: ICD-10-PCS | Mod: PBBFAC,,, | Performed by: PODIATRIST

## 2019-02-14 PROCEDURE — 99999 PR PBB SHADOW E&M-EST. PATIENT-LVL III: CPT | Mod: PBBFAC,,, | Performed by: PODIATRIST

## 2019-02-14 PROCEDURE — 29580 STRAPPING UNNA BOOT: CPT | Mod: 50,PBBFAC | Performed by: PODIATRIST

## 2019-02-14 PROCEDURE — 29580 PR STRAPPING UNNA BOOT: ICD-10-PCS | Mod: 51,RT,S$GLB, | Performed by: PODIATRIST

## 2019-02-16 NOTE — PROGRESS NOTES
Subjective:      Patient ID: Earle Hoff is a 58 y.o. male.    Chief Complaint: Follow-up  Patient presents for follow-up  venous insufficiency, dermatitis, tarsal tunnel pain left side. Patient   reports some pain has returned since unna dressing were not perfomed last week for Rheumatology   visit. Therefore swelling increased, redness, dry skin cracking, inflammation and pain started to again.  Now has the same rash right inner forearm. Reports Rheumatologist had a lot of blood work done and   he is hoping to hear the results via phone since no follow-up appointment was made. She then sent   him to a dermatologist in [place] and was instructed to purchase lotion, set up with cardiology   referral, and sent for 2 pairs of compression hose. Feels unna compression has worked and feels the   best. Pain level 5/10 today.       ROS     Constitutional    Pleasant, obese (BMI 44.30) , no distress, well oriented    Cardiovascular          No chest pain, no shortness of breath    Respiratory          No cough, no congestion     Musculoskeletal         YES arthralgias/joint pain, YES swelling in the extremities    Neurological         YES neuritis    Endocrine          h/o DIABETES well controlled, no meds          Objective:      Physical Exam  Vascular         Arterial Pulses Right: posterior tibialis 1/4, dorsalis pedis 2/4, normal CFT   Arterial Pulses Left: posterior tibialis 1/4, dorsalis pedis 1/4, normal CFT   Moderate lower extremity edema bilateral    Chronic venous insufficiency left greater than right     Integumentary         Superficial friction burn/ abrasion anterior right lower extremity from below the knee to the ankle            is healing well.        Inflammatory dermatitis/venous stais with cracking, erythema anterior and medial lower legs            has progressed, no calor, no weeping         SEE DIGITAL PHOTO UNDER MEDIA    Neurological   Increased discomfort palpating tarsal tunnel left       Musculoskeletal   Muscle Strength/Testing and Tone:  Intact, walks well unassisted  Joints, Bones, and Muscles: Limited ROM              Assessment:       Encounter Diagnoses   Name Primary?    Abrasion of anterior lower leg, right, subsequent encounter Yes    Tarsal tunnel syndrome, left lower limb     Edema, lower extremity     Venous insufficiency of both lower extremities          Plan:       Earle was seen today for follow-up.    Diagnoses and all orders for this visit:    Abrasion of anterior lower leg, right, subsequent encounter    Tarsal tunnel syndrome, left lower limb    Edema, lower extremity    Venous insufficiency of both lower extremities      Advised patient only lab results back from rheumatologist so far are nonspecific for inflammation.    These were included and positive in labsordered by Dr. Valentine, however they are twice as high  now.  Again explained to patient and his wife these confirm inflammatory reaction and I am confident   rheumatologist will be able to diagnose underlying cause of recurrencing venous and skin issues,   obviously a systemic condition with the same rash now on the right arm  Advised patient if rheumatologist does not contact them early next week with the results of the blood   work I would recommend they contact her office.  Plan is to continue Unna compression wrap twice weekly.  Continue Lyrica 75 mg tid daily  Continue Mobic 1 daily  Patient was in understanding and agreement with treatment plan.  Bilateral Unna boots applied today.     Instructed patient to monitor for weeping, increased redness, swelling, warmth, pain.  Counseled patient on his conditions, their implications and medical management.  Instructed patient to contact the office with any, questions, concerns, worsening of symptoms.   Patient verbalized understanding.   Total face to face time, exam, assessment, treatment, discussion, documentation 15 minutes,   more than half this time spent on  consultation and coordination of care.   Additional time for bilateral   Unna boot application  Follow up 1 week    This note was created using [employer] voice recognition software that occasionally misinterpreted   phrases or words.

## 2019-02-19 ENCOUNTER — OFFICE VISIT (OUTPATIENT)
Dept: PODIATRY | Facility: CLINIC | Age: 59
End: 2019-02-19
Payer: COMMERCIAL

## 2019-02-19 ENCOUNTER — TELEPHONE (OUTPATIENT)
Dept: PODIATRY | Facility: CLINIC | Age: 59
End: 2019-02-19

## 2019-02-19 VITALS
OXYGEN SATURATION: 98 % | TEMPERATURE: 98 F | BODY MASS INDEX: 44.43 KG/M2 | DIASTOLIC BLOOD PRESSURE: 71 MMHG | RESPIRATION RATE: 18 BRPM | SYSTOLIC BLOOD PRESSURE: 129 MMHG | HEART RATE: 80 BPM | WEIGHT: 300 LBS | HEIGHT: 69 IN

## 2019-02-19 DIAGNOSIS — M79.2 NEUROPATHIC PAIN: ICD-10-CM

## 2019-02-19 DIAGNOSIS — G57.52 TARSAL TUNNEL SYNDROME, LEFT LOWER LIMB: ICD-10-CM

## 2019-02-19 DIAGNOSIS — I87.2 VENOUS STASIS DERMATITIS OF BOTH LOWER EXTREMITIES: ICD-10-CM

## 2019-02-19 DIAGNOSIS — I87.2 VENOUS INSUFFICIENCY OF BOTH LOWER EXTREMITIES: ICD-10-CM

## 2019-02-19 DIAGNOSIS — R60.0 EDEMA, LOWER EXTREMITY: ICD-10-CM

## 2019-02-19 PROCEDURE — 3008F PR BODY MASS INDEX (BMI) DOCUMENTED: ICD-10-PCS | Mod: CPTII,S$GLB,, | Performed by: PODIATRIST

## 2019-02-19 PROCEDURE — 99999 PR PBB SHADOW E&M-EST. PATIENT-LVL IV: ICD-10-PCS | Mod: PBBFAC,,, | Performed by: PODIATRIST

## 2019-02-19 PROCEDURE — 99214 PR OFFICE/OUTPT VISIT, EST, LEVL IV, 30-39 MIN: ICD-10-PCS | Mod: 25,S$GLB,, | Performed by: PODIATRIST

## 2019-02-19 PROCEDURE — 29580 STRAPPING UNNA BOOT: CPT | Mod: LT,S$GLB,, | Performed by: PODIATRIST

## 2019-02-19 PROCEDURE — 29580 PR STRAPPING UNNA BOOT: ICD-10-PCS | Mod: 51,RT,S$GLB, | Performed by: PODIATRIST

## 2019-02-19 PROCEDURE — 99999 PR PBB SHADOW E&M-EST. PATIENT-LVL IV: CPT | Mod: PBBFAC,,, | Performed by: PODIATRIST

## 2019-02-19 PROCEDURE — 3008F BODY MASS INDEX DOCD: CPT | Mod: CPTII,S$GLB,, | Performed by: PODIATRIST

## 2019-02-19 PROCEDURE — 99214 OFFICE O/P EST MOD 30 MIN: CPT | Mod: 25,S$GLB,, | Performed by: PODIATRIST

## 2019-02-19 RX ORDER — CRISABOROLE 20 MG/G
8 OINTMENT TOPICAL DAILY
Qty: 60 G | Refills: 4 | Status: SHIPPED | OUTPATIENT
Start: 2019-02-19 | End: 2019-02-19

## 2019-02-19 RX ORDER — CRISABOROLE 20 MG/G
8 OINTMENT TOPICAL DAILY
Qty: 60 G | Refills: 4 | Status: SHIPPED | OUTPATIENT
Start: 2019-02-19

## 2019-02-19 RX ORDER — OXYCODONE AND ACETAMINOPHEN 10; 325 MG/1; MG/1
1 TABLET ORAL EVERY 8 HOURS PRN
Qty: 30 TABLET | Refills: 0 | Status: SHIPPED | OUTPATIENT
Start: 2019-02-19 | End: 2019-04-05 | Stop reason: SDUPTHER

## 2019-02-19 NOTE — TELEPHONE ENCOUNTER
Patient is in Podiatry office today requesting a phone call from Rheumatology for lab results.      Please call patient at [phone].  If patient does not answer his phone, please call his wife, Henny, at [phone].      Thank you!

## 2019-02-20 ENCOUNTER — TELEPHONE (OUTPATIENT)
Dept: RHEUMATOLOGY | Facility: CLINIC | Age: 59
End: 2019-02-20

## 2019-02-20 NOTE — PROGRESS NOTES
"Subjective:      Patient ID: Earle Hoff is a 58 y.o. male.    Chief Complaint: Follow-up  Patient presents with wife for follow-up  venous insufficiency, dermatitis, tarsal tunnel/neuritis pain left greater than right side.  Reports skin is looking better with to no compression dressings, still slightly warm, red and dry. No open areas. Relates pain has not improved much, sharp shooting, shocking pain especially in the left lower leg.  Pain level 6/10.  Patient reports he now has a large area on the inside of his right forearm that has developed same rashes well.    He states he was not feeling well for 2-3 days, was in bed with the low-grade fever which he states "broke"  yesterday.  He is not sure if it is related to the rash, feels better today.  No fever this morning.. He had appointment with Dr. Abrams, rheumatologist on 2/12  and has not received a phone call regarding results or a follow-up appointment.      ROS     Constitutional    Pleasant, obese (BMI 44.30) , no distress, well oriented    Cardiovascular          No chest pain, no shortness of breath    Respiratory          No cough, no congestion     Musculoskeletal         YES arthralgias/joint pain, YES swelling in the extremities    Neurological         YES neuritis    Endocrine          h/o DIABETES well controlled, no meds          Objective:      Physical Exam  Vascular         Arterial Pulses Right: posterior tibialis 1/4, dorsalis pedis 2/4, normal CFT   Arterial Pulses Left: posterior tibialis 1/4, dorsalis pedis 1/4, normal CFT   Moderate lower extremity edema bilateral    Chronic venous insufficiency left greater than right     Integumentary         Superficial friction burn/ abrasion anterior right lower extremity from below the knee to the ankle is healing well, dry, still outline present.        Inflammatory dermatitis/venous stais with cracking, erythema, minimal calor anterior lower legs.   There is some progression, jerking and of " the skin in the medial aspect of the foot and ankle left greater than right.  This area does remain dry, no skin opening.         SEE DIGITAL PHOTO UNDER MEDIA    Neurological   Increased tarsal tunnel/neuritis left greater than right       Musculoskeletal   Muscle Strength/Testing and Tone:  Intact, walks well unassisted  Joints, Bones, and Muscles: Limited ROM              Assessment:       Encounter Diagnoses   Name Primary?    Venous stasis dermatitis of both lower extremities     Tarsal tunnel syndrome, left lower limb     Edema, lower extremity     Neuropathic pain - Left Foot     Venous insufficiency of both lower extremities          Plan:       Earle was seen today for follow-up.    Diagnoses and all orders for this visit:    Venous stasis dermatitis of both lower extremities  -     oxyCODONE-acetaminophen (PERCOCET)  mg per tablet; Take 1 tablet by mouth every 8 (eight) hours as needed for Pain.    Tarsal tunnel syndrome, left lower limb    Edema, lower extremity    Neuropathic pain - Left Foot    Venous insufficiency of both lower extremities    Other orders  -     Discontinue: EUCRISA 2 % Oint; Apply 8 g topically once daily.  -     EUCRISA 2 % Oint; Apply 8 g topically once daily.      Instructed patient if fever spikes again he needs to visit with his PCP, Dr. Ray  Advised patient our nurse put message in to Dr. Abrams's  nurse to request they phone him regarding lab work.  Advised patient once diagnosis and treatment are set up he most likely can continue treatment closer to home either rheumatologist in [place] or PCP locally.  Advised patient and wife if they do not hear back from their office in the next 2 days I would recommend they contact their office as well.  Instructed to call our office if we can be of any further assistance.  In the meantime advised patient it is crucial we continue compression to his legs, he has responded well to the zinc oxide compression dressing and it  should be continued twice weekly until all remaining redness and dry skin are resolved.   Patient's wife would like to start applying these at home to decrease amount of doctors visits.  She feels she is capable of doing this and declines home health.   Advised wife on product being used, she most likely can order it online.  Explained to wife it is crucial that she does not over tighten the bandages and lays the medicated dressing on the skin.  Patient is aware that he is to remove any bandage whether we apply it or it is done at home immediately if it causes discomfort, pain, or is too tight.  Patient verbalized understanding.  Advised patient any time the medicated bandage is removed he can shower, dry completely, apply Eucrisa as directed, dry well and if he wants to go without the medicated dressing he is to wear his compression hose.  Advised patient at no time is he to go without any compression on his legs.  Patient wife verbalized understanding.  Plan is to continue Unna compression wrap twice weekly.  Continue Lyrica 75 mg tid daily  Continue Mobic 1 daily  Refill Eucrisa  Refill dispensed for Percocet 10 mg, every 8 hr as needed for pain.  Instructed to monitor for weeping, increased redness, swelling, warmth, pain.  Patient and wife were in understanding and agreement with treatment plan.  Bilateral Unna boots applied today.     Counseled patient on his conditions, their implications and medical management.  Instructed patient to contact the office with any, questions, concerns, worsening of symptoms.   Patient verbalized understanding.   Total face to face time, exam, assessment, treatment, discussion, documentation 25 minutes, more than half this time spent on consultation and coordination of care.   Additional time for bilateral Unna  Compression dressing bilateral  Follow up 2 weeks    This note was created using "[employer] voice recognition software that occasionally misinterpreted phrases or  words.

## 2019-02-21 ENCOUNTER — TELEPHONE (OUTPATIENT)
Dept: RHEUMATOLOGY | Facility: CLINIC | Age: 59
End: 2019-02-21

## 2019-02-21 ENCOUNTER — HOSPITAL ENCOUNTER (OUTPATIENT)
Dept: RADIOLOGY | Facility: HOSPITAL | Age: 59
Discharge: HOME | End: 2019-02-21
Attending: INTERNAL MEDICINE
Payer: COMMERCIAL

## 2019-02-21 ENCOUNTER — OFFICE VISIT (OUTPATIENT)
Dept: RHEUMATOLOGY | Facility: CLINIC | Age: 59
End: 2019-02-21
Payer: COMMERCIAL

## 2019-02-21 VITALS
HEIGHT: 69 IN | BODY MASS INDEX: 46.33 KG/M2 | SYSTOLIC BLOOD PRESSURE: 106 MMHG | DIASTOLIC BLOOD PRESSURE: 70 MMHG | HEART RATE: 71 BPM | WEIGHT: 312.81 LBS

## 2019-02-21 DIAGNOSIS — M79.642 PAIN IN BOTH HANDS: ICD-10-CM

## 2019-02-21 DIAGNOSIS — E66.01 MORBID OBESITY WITH BMI OF 45.0-49.9, ADULT: ICD-10-CM

## 2019-02-21 DIAGNOSIS — M79.641 PAIN IN BOTH HANDS: ICD-10-CM

## 2019-02-21 DIAGNOSIS — M79.642 PAIN IN BOTH HANDS: Primary | ICD-10-CM

## 2019-02-21 DIAGNOSIS — M79.641 PAIN IN BOTH HANDS: Primary | ICD-10-CM

## 2019-02-21 DIAGNOSIS — M15.9 PRIMARY OSTEOARTHRITIS INVOLVING MULTIPLE JOINTS: ICD-10-CM

## 2019-02-21 PROCEDURE — 73130 X-RAY EXAM OF HAND: CPT | Mod: 50,TC,FY

## 2019-02-21 PROCEDURE — 99999 PR PBB SHADOW E&M-EST. PATIENT-LVL III: CPT | Mod: PBBFAC,,, | Performed by: INTERNAL MEDICINE

## 2019-02-21 PROCEDURE — 73130 X-RAY EXAM OF HAND: CPT | Mod: 26,59,LT, | Performed by: RADIOLOGY

## 2019-02-21 PROCEDURE — 99214 PR OFFICE/OUTPT VISIT, EST, LEVL IV, 30-39 MIN: ICD-10-PCS | Mod: S$GLB,,, | Performed by: INTERNAL MEDICINE

## 2019-02-21 PROCEDURE — 3008F PR BODY MASS INDEX (BMI) DOCUMENTED: ICD-10-PCS | Mod: CPTII,S$GLB,, | Performed by: INTERNAL MEDICINE

## 2019-02-21 PROCEDURE — 3008F BODY MASS INDEX DOCD: CPT | Mod: CPTII,S$GLB,, | Performed by: INTERNAL MEDICINE

## 2019-02-21 PROCEDURE — 99999 PR PBB SHADOW E&M-EST. PATIENT-LVL III: ICD-10-PCS | Mod: PBBFAC,,, | Performed by: INTERNAL MEDICINE

## 2019-02-21 PROCEDURE — 73130 XR HAND COMPLETE 3 VIEWS BILATERAL: ICD-10-PCS | Mod: 26,RT,, | Performed by: RADIOLOGY

## 2019-02-21 PROCEDURE — 99214 OFFICE O/P EST MOD 30 MIN: CPT | Mod: S$GLB,,, | Performed by: INTERNAL MEDICINE

## 2019-02-21 PROCEDURE — 73130 X-RAY EXAM OF HAND: CPT | Mod: 26,RT,, | Performed by: RADIOLOGY

## 2019-02-21 ASSESSMENT — ROUTINE ASSESSMENT OF PATIENT INDEX DATA (RAPID3)
PSYCHOLOGICAL DISTRESS SCORE: 6.6
PAIN SCORE: 8
FATIGUE SCORE: 5
WHEN YOU AWAKENED IN THE MORNING OVER THE LAST WEEK, PLEASE INDICATE THE AMOUNT OF TIME IT TAKES UNTIL YOU ARE AS LIMBER AS YOU WILL BE FOR THE DAY: COUPLE OF HOURS
MDHAQ FUNCTION SCORE: .3
AM STIFFNESS SCORE: 1, YES

## 2019-02-21 NOTE — PROGRESS NOTES
"Subjective:       Patient ID: Earle Hoff is a 58 y.o. male.    Chief Complaint: Hand pain   HPI 59 yo male with multiple co morbidities including RAFAEL, OA, DM2, Gout and HLD is seen in consultation for hand pain. He has seen Dr Ruiz. CTD were ruled out.He has chronic venous stasis.  Today, he is here for a new complaint. Patient complains of pain in hands for  3 weeks.  Pain is aching type, intermittent, worse in the morning, improves as the day progresses, no change in pain with activity, better with rest.   Early morning stiffness lasts for more than 1 hr   He denies fever, weight loss, dry eyes or mouth, photosensitivity,  ulcers, Raynaud's phenomenon, alopecia, dysphagia, diarrhea or blood in the stools    Review of Systems   Constitutional: Positive for fatigue. Negative for fever.   HENT: Negative for ear discharge and ear pain.    Eyes: Negative for pain and redness.   Respiratory: Negative for cough and shortness of breath.    Cardiovascular: Negative for chest pain and palpitations.   Gastrointestinal: Negative for abdominal distention and abdominal pain.   Genitourinary: Negative for genital sores and hematuria.   Musculoskeletal: Positive for arthralgias and myalgias.   Skin: Positive for rash. Negative for pallor.   Neurological: Negative for tremors and seizures.   Psychiatric/Behavioral: Negative for agitation and hallucinations.         Objective:   /70   Pulse 71   Ht 5' 9" (1.753 m)   Wt (!) 141.9 kg (312 lb 13.3 oz)   BMI 46.20 kg/m²      Physical Exam   Nursing note and vitals reviewed.  Constitutional: He is oriented to person, place, and time and well-developed, well-nourished, and in no distress.   HENT:   Head: Normocephalic and atraumatic.   Eyes: Conjunctivae and EOM are normal. Pupils are equal, round, and reactive to light.   Neck: Normal range of motion. Neck supple. No thyromegaly present.   Cardiovascular: Normal rate and regular rhythm.  Exam reveals no friction rub.  "   Pulmonary/Chest: Effort normal and breath sounds normal.   Abdominal: Soft. Bowel sounds are normal. He exhibits no mass.   Neurological: He is alert and oriented to person, place, and time. He exhibits normal muscle tone.   Skin: Skin is warm and dry.     Psychiatric: Memory and affect normal.   Musculoskeletal: He exhibits edema.     No joint effusion                   Lab Results   Component Value Date    WBC 8.40 02/12/2019    HGB 14.3 02/12/2019    HCT 44.6 02/12/2019    MCV 92 02/12/2019     02/12/2019     CMP  Sodium   Date Value Ref Range Status   02/12/2019 144 136 - 145 mmol/L Final     Potassium   Date Value Ref Range Status   02/12/2019 4.1 3.5 - 5.1 mmol/L Final     Chloride   Date Value Ref Range Status   02/12/2019 110 95 - 110 mmol/L Final     CO2   Date Value Ref Range Status   02/12/2019 25 23 - 29 mmol/L Final     Glucose   Date Value Ref Range Status   02/12/2019 73 70 - 110 mg/dL Final     BUN, Bld   Date Value Ref Range Status   02/12/2019 18 6 - 20 mg/dL Final     Creatinine   Date Value Ref Range Status   02/12/2019 1.4 0.5 - 1.4 mg/dL Final     Calcium   Date Value Ref Range Status   02/12/2019 9.4 8.7 - 10.5 mg/dL Final     Total Protein   Date Value Ref Range Status   02/12/2019 7.7 6.0 - 8.4 g/dL Final     Albumin   Date Value Ref Range Status   02/12/2019 3.7 3.5 - 5.2 g/dL Final     Total Bilirubin   Date Value Ref Range Status   02/12/2019 0.7 0.1 - 1.0 mg/dL Final     Comment:     For infants and newborns, interpretation of results should be based  on gestational age, weight and in agreement with clinical  observations.  Premature Infant recommended reference ranges:  Up to 24 hours.............<8.0 mg/dL  Up to 48 hours............<12.0 mg/dL  3-5 days..................<15.0 mg/dL  6-29 days.................<15.0 mg/dL       Alkaline Phosphatase   Date Value Ref Range Status   02/12/2019 94 55 - 135 U/L Final     AST   Date Value Ref Range Status   02/12/2019 22 10 - 40 U/L  Final     ALT   Date Value Ref Range Status   02/12/2019 34 10 - 44 U/L Final     Anion Gap   Date Value Ref Range Status   02/12/2019 9 8 - 16 mmol/L Final     eGFR if    Date Value Ref Range Status   02/12/2019 >60.0 >60 mL/min/1.73 m^2 Final     eGFR if non    Date Value Ref Range Status   02/12/2019 55.0 (A) >60 mL/min/1.73 m^2 Final     Comment:     Calculation used to obtain the estimated glomerular filtration  rate (eGFR) is the CKD-EPI equation.        Lab Results   Component Value Date    SEDRATE 31 (H) 02/12/2019     Lab Results   Component Value Date    CRP 16.6 (H) 02/12/2019     Results for KIM MARTIN (MRN [id number]) as of 2/21/2019 09:13   Ref. Range 1/2/2019 14:35 2/12/2019 16:40 2/12/2019 16:40   CAROL Screen Latest Ref Range: Negative <1:160  Negative <1:160 Negative <1:160    Cytoplasmic Neutrophilic Ab Latest Ref Range: <1:20 Titer <1:20 <1:20    Perinuclear (P-ANCA) Latest Ref Range: <1:20 Titer <1:20 <1:20    Complement (C-3) Latest Ref Range: 50 - 180 mg/dL 144 157 157   Complement (C-4) Latest Ref Range: 11 - 44 mg/dL 28 30 30   Protein, Serum Latest Ref Range: 6.0 - 8.4 g/dL  7.3    Albumin grams/dl Latest Ref Range: 3.35 - 5.55 g/dL  3.91    Alpha-1 grams/dl Latest Ref Range: 0.17 - 0.41 g/dL  0.31    Alpha-2 grams/dl Latest Ref Range: 0.43 - 0.99 g/dL  0.91    Beta grams/dl Latest Ref Range: 0.50 - 1.10 g/dL  0.96    Gamma grams/dl Latest Ref Range: 0.67 - 1.58 g/dL  1.22    Pathologist Interpretation SPE Unknown  REVIEWED    CCP Antibodies Latest Ref Range: <5.0 U/mL  <0.5    Rheumatoid Factor Latest Ref Range: 0.0 - 15.0 IU/mL <10.0 <10.0      Radiology: I personally reviewed imaging  X RAY FEET 12/2018   Mild degenerative changes 1st MTP joint.  Moderate degenerative change hallux sesamoid joint  Assessment:   Hand pain- Check X ray hands   OA of multiple joints   Morbid Obesity   Venous stasis   Plan:   Discussed rheumatology results with patient,  answered all his questions  Check X ray hands  Advised to exercise as tolerated  Counseled about obesity, discussed lifestyle modification to lose weight   RTC PRN     -Patient seen face to face for 25 minutes and greater than 50% spent in counseling regarding above diagnosis and chart review

## 2019-02-21 NOTE — TELEPHONE ENCOUNTER
Spoke to patient and confirmed that his blood work is negative for a connective tissue disorder and that he should follow up with Derm. The patient verbalized understanding. He asked about his appt tomorrow w Rheum. I explained that he is welcome to keep the appt and to get a second opinion and that they will have all of his labs via [facility]. Pt verbalized understanding and confirmed appt.

## 2019-02-21 NOTE — LETTER
February 21, 2019      Alexsandra Griffin, DPM  [address]  [address]  [facility] [place] [address]           [facility] - Rheumatology  [address]. [address] [address]  Phone: [phone]  Fax: [phone]          Patient: Earle Hoff   MR Number: [id number]   YOB: 1960   Date of Visit: 2/21/2019       Dear Dr. Alexsandra Griffin:    Thank you for referring Earle Hoff to me for evaluation. Attached you will find relevant portions of my assessment and plan of care.    If you have questions, please do not hesitate to call me. I look forward to following Earle Hoff along with you.    Sincerely,    Antonina Summers MD    Enclosure  CC:  No Recipients    If you would like to receive this communication electronically, please contact [email or web address] or [phone] to request more information on [facility] access.    For providers and/or their staff who would like to refer a patient to [facility], please contact us through our one-stop-shop provider referral line, [facility], at [phone].    If you feel you have received this communication in error or would no longer like to receive these types of communications, please e-mail [email or web address]

## 2019-02-21 NOTE — TELEPHONE ENCOUNTER
----- Message from Jaymie Moore sent at 2/21/2019  1:01 PM CST -----  Contact: patient  Type:  Patient Returning Call    Who Called:  patient  Who Left Message for Patient:  Not sure  Does the patient know what this is regarding?:  yes  Best Call Back Number:  [phone]  Additional Information:  Requesting a call back

## 2019-03-06 ENCOUNTER — OFFICE VISIT (OUTPATIENT)
Dept: PODIATRY | Facility: CLINIC | Age: 59
End: 2019-03-06
Payer: COMMERCIAL

## 2019-03-06 VITALS
HEART RATE: 68 BPM | DIASTOLIC BLOOD PRESSURE: 80 MMHG | RESPIRATION RATE: 18 BRPM | HEIGHT: 69 IN | TEMPERATURE: 97 F | WEIGHT: 312 LBS | BODY MASS INDEX: 46.21 KG/M2 | OXYGEN SATURATION: 99 % | SYSTOLIC BLOOD PRESSURE: 127 MMHG

## 2019-03-06 DIAGNOSIS — I87.2 VENOUS INSUFFICIENCY: Primary | ICD-10-CM

## 2019-03-06 DIAGNOSIS — Z86.79 HISTORY OF STASIS DERMATITIS: ICD-10-CM

## 2019-03-06 DIAGNOSIS — R79.82 ELEVATED C-REACTIVE PROTEIN (CRP): ICD-10-CM

## 2019-03-06 DIAGNOSIS — G57.52 TARSAL TUNNEL SYNDROME, LEFT LOWER LIMB: ICD-10-CM

## 2019-03-06 DIAGNOSIS — R70.0 ELEVATED SED RATE: ICD-10-CM

## 2019-03-06 PROCEDURE — 3008F PR BODY MASS INDEX (BMI) DOCUMENTED: ICD-10-PCS | Mod: CPTII,S$GLB,, | Performed by: PODIATRIST

## 2019-03-06 PROCEDURE — 99999 PR PBB SHADOW E&M-EST. PATIENT-LVL III: ICD-10-PCS | Mod: PBBFAC,,, | Performed by: PODIATRIST

## 2019-03-06 PROCEDURE — 99214 PR OFFICE/OUTPT VISIT, EST, LEVL IV, 30-39 MIN: ICD-10-PCS | Mod: S$GLB,,, | Performed by: PODIATRIST

## 2019-03-06 PROCEDURE — 3008F BODY MASS INDEX DOCD: CPT | Mod: CPTII,S$GLB,, | Performed by: PODIATRIST

## 2019-03-06 PROCEDURE — 99214 OFFICE O/P EST MOD 30 MIN: CPT | Mod: S$GLB,,, | Performed by: PODIATRIST

## 2019-03-06 PROCEDURE — 99999 PR PBB SHADOW E&M-EST. PATIENT-LVL III: CPT | Mod: PBBFAC,,, | Performed by: PODIATRIST

## 2019-03-06 NOTE — PROGRESS NOTES
Subjective:      Patient ID: Earle Hoff is a 58 y.o. male.    Chief Complaint: Follow-up  Patient presents for follow-up venous insufficiency, dermatitis with stasis ulcers, tarsal tunnel/neuritis pain left greater than right side.  Patient inquires about few abnormal results with blood work done at the rheumatologist which he received through my chart.  Received phone call, no disorders found, no follow up made. Does not want to go back to the dermatologist at this time.   Patient reports he ordered a case of Unna boot type/zinc oxide dressings and his wife has been applying every 2-3 days since last visit.  He relates significant improvement and has just worn compression hose and applied lotion to his legs for the last 3 days.  Has not had any burning, shooting or radiating pain in his legs for 3 days.   Does confirm he is taking Lyrica daily.  On his last checkup with his cardiologist Dr. Rodriguez he explained to him what has been going on with his legs, rheumatology and dermatology visits with [facility].  He reports Dr. Rodriguez referred him to a vein doctor in [place],  has an appointment in April. No pain today.       ROS     Constitutional    Pleasant, obese (BMI 44.30) , no distress    Cardiovascular          No chest pain, no shortness of breath    Respiratory          No cough, no congestion     Musculoskeletal         YES arthralgias/joint pain, YES swelling in the extremities    Neurological         YES neuritis    Endocrine          h/o DIABETES well controlled, no meds          Objective:      Physical Exam  Vascular         Arterial Pulses Right: posterior tibialis 1/4, dorsalis pedis 2/4, normal CFT   Arterial Pulses Left: posterior tibialis 1/4, dorsalis pedis 1/4, normal CFT   Moderate lower extremity edema bilateral    Chronic venous insufficiency left greater than right     Integumentary          At this point most of patient's chronic dermatitis and stasis issues are under control, but not  resolved.  He remains at risk for complications.  Area of stasis ulcer medial left ankle is dry, well healed with good color, some scarring noted.  There are some superficial cracking and abrasions lateral left heel near Achilles tendon region, no pain, erythema or edema.  Few noted in same area on the right heel.  Pictures below compare most recent flare up which started in November 2018 compared to phots taken today.   This is the 2nd flare up since I have started treating the patient, and this was worse than the previous one which was 1 and half to 2 years ago    Neurological    no tarsal tunnel pain bilateral today      Musculoskeletal   Muscle Strength/Testing and Tone:  Intact, walks well unassisted  Joints, Bones, and Muscles: Limited but improved ROM                              Contains abnormal data C-reactive protein    Ref Range & Units 3wk ago 2mo ago   CRP 0.0 - 8.2 mg/L 16.6 Abnormally high   0.98 Abnormally high  R           Sedimentation rate    Ref Range & Units 3wk ago 2mo ago   Sed Rate 0 - 23 mm/Hr 31 Abnormally high   15 Abnormally high  R           Hexagonal Phospholipid Neutralization    Ref Range & Units 3wk ago   Hex Phosph Neut Test Negative Positive Abnormal             Contains abnormal data Cardiolipin antibody    Ref Range & Units 3wk ago   APA Isotype IgG 0.00 - 14.99 GPL <9.40    Comment: IgG Anticardiolipin Antibody:     APA Isotype IgM 0.00 - 12.49 MPL 18.59 Abnormally high                   Assessment:       Encounter Diagnoses   Name Primary?    Venous insufficiency Yes    History of stasis dermatitis     Tarsal tunnel syndrome, left lower limb     Elevated sed rate     Elevated C-reactive protein (CRP)          Plan:       Earle was seen today for follow-up.    Diagnoses and all orders for this visit:    Venous insufficiency    History of stasis dermatitis    Tarsal tunnel syndrome, left lower limb    Elevated sed rate    Elevated C-reactive protein (CRP)       Reviewed  blood work ordered by rheumatologist at length with patient discussing abnormal sed rate, CPR, cardiolipin and  Phospholipid  Instructed patient again at no time is he to go without any compression on his legs. Patient verbalized understanding.  Plan is to continue Unna compression wrap at home as needed for early breakouts tp resolve quickly, compression hose any day not in unna wrap and lotion/eucrisa as directed to dry skin weekly. Monitor for any changes or  Areas not responding with these methods in 2 days.  Continue Lyrica 75 mg tid daily  Continue Mobic 1 daily  Instructed to monitor for weeping, increased redness, swelling, warmth, pain.  Patient was in understanding and agreement with treatment plan.  Counseled patient on his conditions, their implications and medical management.  Instructed patient to contact the office with any, questions, concerns, worsening of symptoms.   Patient verbalized understanding.   Total face to face time, exam, assessment, treatment, discussion, documentation 25 minutes, more than half this time spent on consultation and coordination of care.    Follow up 4 weeks    This note was created using M*Modal voice recognition software that occasionally misinterpreted phrases or words.

## 2019-04-05 ENCOUNTER — OFFICE VISIT (OUTPATIENT)
Dept: PODIATRY | Facility: CLINIC | Age: 59
End: 2019-04-05
Payer: COMMERCIAL

## 2019-04-05 VITALS
RESPIRATION RATE: 18 BRPM | HEART RATE: 62 BPM | SYSTOLIC BLOOD PRESSURE: 119 MMHG | BODY MASS INDEX: 44.43 KG/M2 | TEMPERATURE: 98 F | DIASTOLIC BLOOD PRESSURE: 83 MMHG | HEIGHT: 69 IN | OXYGEN SATURATION: 99 % | WEIGHT: 300 LBS

## 2019-04-05 DIAGNOSIS — R60.0 EDEMA, LOWER EXTREMITY: ICD-10-CM

## 2019-04-05 DIAGNOSIS — M79.2 NEUROPATHIC PAIN: ICD-10-CM

## 2019-04-05 DIAGNOSIS — Z86.79 HISTORY OF STASIS DERMATITIS: ICD-10-CM

## 2019-04-05 DIAGNOSIS — I87.2 VENOUS STASIS DERMATITIS OF BOTH LOWER EXTREMITIES: ICD-10-CM

## 2019-04-05 PROCEDURE — 3008F BODY MASS INDEX DOCD: CPT | Mod: CPTII,S$GLB,, | Performed by: PODIATRIST

## 2019-04-05 PROCEDURE — 99214 PR OFFICE/OUTPT VISIT, EST, LEVL IV, 30-39 MIN: ICD-10-PCS | Mod: S$GLB,,, | Performed by: PODIATRIST

## 2019-04-05 PROCEDURE — 99214 OFFICE O/P EST MOD 30 MIN: CPT | Mod: S$GLB,,, | Performed by: PODIATRIST

## 2019-04-05 PROCEDURE — 99999 PR PBB SHADOW E&M-EST. PATIENT-LVL IV: ICD-10-PCS | Mod: PBBFAC,,, | Performed by: PODIATRIST

## 2019-04-05 PROCEDURE — 99999 PR PBB SHADOW E&M-EST. PATIENT-LVL IV: CPT | Mod: PBBFAC,,, | Performed by: PODIATRIST

## 2019-04-05 PROCEDURE — 3008F PR BODY MASS INDEX (BMI) DOCUMENTED: ICD-10-PCS | Mod: CPTII,S$GLB,, | Performed by: PODIATRIST

## 2019-04-05 RX ORDER — OXYCODONE AND ACETAMINOPHEN 10; 325 MG/1; MG/1
1 TABLET ORAL EVERY 8 HOURS PRN
Qty: 30 TABLET | Refills: 0 | Status: SHIPPED | OUTPATIENT
Start: 2019-04-05 | End: 2022-02-23

## 2019-04-05 RX ORDER — MELOXICAM 15 MG/1
TABLET ORAL
COMMUNITY
Start: 2019-03-27 | End: 2019-05-02 | Stop reason: SDUPTHER

## 2019-04-08 NOTE — PROGRESS NOTES
Subjective:      Patient ID: Earle Hoff is a 59 y.o. male.    Chief Complaint: Follow-up  Patient presents with his wife for follow-up venous insufficiency, dermatitis with previous stasis ulcers, tarsal tunnel/neuritis pain left.   Patient reports overall condition of skin has remain improved, he monitors it closely and has been using Eucrisa and other lotion he obtain from dermatologist.  States even though skin has improved significantly if he is on his feet for just a few hours swelling starts, legs and feet feel like they are burning and in intensifies the pain in the left leg.  Confirms he is following through with his cardiologist, Dr. Rodriguez, referral to the vein doctor, having a test done on his veins.  He has not had any further contact or follow-up appointment with [facility] rheumatology or dermatology.  Dermatology had sent him for custom compression hose which were expensive, states they are tight, painful, he cannot wear them.  Patient inquires if he can have a refill on pain medication.  States he uses it sparingly, only as needed, but after being on his feet for a while when pain, burning occurs and specially the left leg it is severe and he has difficulty sleeping at night.  Does confirm he is still taking Lyrica daily. Pain level today 2/10    ROS     Constitutional    Pleasant, obese (BMI 44.30) , no distress    Cardiovascular          No chest pain, no shortness of breath    Respiratory          No cough, no congestion     Musculoskeletal         YES arthralgias/joint pain, YES swelling in the extremities    Neurological         YES neuritis    Endocrine          h/o DIABETES well controlled, no meds          Objective:      Physical Exam  Vascular         Arterial Pulses Right: posterior tibialis 1/4, dorsalis pedis 2/4, normal CFT   Arterial Pulses Left: posterior tibialis 1/4, dorsalis pedis 2/4, normal CFT   Moderate lower extremity edema bilateral    Chronic venous insufficiency left  greater than right     Integumentary           Considerable improvement and skin from previous visit.  Patient is doing an excellent job utilizing topicals to maintain, spot treat dermatitis.  There is light discoloration of both lower extremities due to chronic trauma to the skin.  Legs were in very poor condition about 6 months ago.  Today skin is  Fairly normal color, several small light erythematous areas of dermatitis, some small cracks around the heels, no draining areas.  There is a mild calor to both lower extremities which patient has had on majority of his visits, there is obviously underlying chronic inflammatory process contributing a chronic skin in vein issues    Neurological    Pain in tarsal tunnel region left side today      Musculoskeletal   Muscle Strength/Testing and Tone:  Intact, walks well unassisted  Joints, Bones, and Muscles: Limited but improved ROM                      Assessment:       Encounter Diagnoses   Name Primary?    Venous stasis dermatitis of both lower extremities     History of stasis dermatitis     Neuropathic pain - Left Foot     Edema, lower extremity          Plan:       Earle was seen today for follow-up.    Diagnoses and all orders for this visit:    Venous stasis dermatitis of both lower extremities  -     oxyCODONE-acetaminophen (PERCOCET)  mg per tablet; Take 1 tablet by mouth every 8 (eight) hours as needed for Pain.    History of stasis dermatitis    Neuropathic pain - Left Foot    Edema, lower extremity       Explained to patient just a few abnormalities in blood work, warmth to his legs and chronic vein and skin issues definitely confirms venous insufficiency.  Advised patient I would suspect testing done by the vein doctor would be abnormal, however the question is if an underlying cause can be found and or treatment to prevent recurrence.  Advised patient in my opinion at this point the best thing to prevent recurrence are compression hose/socks.   Advised patient he needs to utilize the most comfortable pair he has, put him on in the morning and does not take him off until before bed every day whether he has symptoms of pain or not.  Dispensed extra extra extra-large sample to patient today, information on compression hose they can order online with a zipper for ease of application.  Explained to patient the compression sock is tight and painful it should not be worn, however he needs to utilize wife is comfortable along with treating the skin daily.  Highly encouraged patient to continue to no compression wraps at home at least once a week, he has always responded very well to this treatment medication impregnated in the bandage.  Instructed patient to please have results of any testing forwarded to my office.  Patient was in understanding and agreement with treatment plan  Bilateral unna strapping applied today  REefill for Percocet was dispensed,  is consistent.  Last time this medication was filled was by me on 2/19/2018  Continue Lyrica 75 mg tid daily  Continue Mobic 1 daily  Instructed to monitor for weeping, increased redness, swelling, warmth, pain.  Patient was in understanding and agreement with treatment plan.  Counseled patient on his conditions, their implications and medical management.  Instructed patient to contact the office with any, questions, concerns, worsening of symptoms.   Patient verbalized understanding.   Total face to face time, exam, assessment, treatment, discussion, documentation 25 minutes, more than half this time spent on consultation and coordination of care.    Follow up as needed    This note was created using M*Modal voice recognition software that occasionally misinterpreted phrases or words.

## 2019-04-30 ENCOUNTER — TELEPHONE (OUTPATIENT)
Dept: PODIATRY | Facility: CLINIC | Age: 59
End: 2019-04-30

## 2019-04-30 NOTE — TELEPHONE ENCOUNTER
Pt is now using [facility] mail order pharmacy. He is requesting refill on mobic. The fax # is [phone]. I will need to get this pharmacy added to the system

## 2019-04-30 NOTE — TELEPHONE ENCOUNTER
----- Message from Vilma Disla sent at 4/30/2019 11:28 AM CDT -----  Contact: Henny Hoff (Spouse)  Type: Needs Medical Advice    Who Called:  Henny Hoff (Spouse)  Pharmacy name and phone #:  [facility] mail order    Best Call Back Number: [phone]  Additional Information: calling to let the nurse know which pharmacy to send medication

## 2019-05-02 DIAGNOSIS — M19.072 PRIMARY OSTEOARTHRITIS OF LEFT FOOT: Primary | ICD-10-CM

## 2019-05-02 RX ORDER — MELOXICAM 15 MG/1
15 TABLET ORAL DAILY
Qty: 90 TABLET | Refills: 0 | Status: SHIPPED | OUTPATIENT
Start: 2019-05-02 | End: 2019-07-31

## 2019-08-05 ENCOUNTER — HOSPITAL ENCOUNTER (OUTPATIENT)
Dept: RADIOLOGY | Facility: HOSPITAL | Age: 59
Discharge: HOME | End: 2019-08-05
Attending: FAMILY MEDICINE
Payer: COMMERCIAL

## 2019-08-05 DIAGNOSIS — F17.200 SMOKER: ICD-10-CM

## 2019-08-05 DIAGNOSIS — F17.200 SMOKER: Primary | ICD-10-CM

## 2019-08-05 PROCEDURE — 71046 X-RAY EXAM CHEST 2 VIEWS: CPT | Mod: TC,FY

## 2019-08-05 PROCEDURE — 71046 X-RAY EXAM CHEST 2 VIEWS: CPT | Mod: 26,,, | Performed by: RADIOLOGY

## 2019-08-05 PROCEDURE — 71046 XR CHEST PA AND LATERAL: ICD-10-PCS | Mod: 26,,, | Performed by: RADIOLOGY

## 2019-11-25 ENCOUNTER — TELEPHONE (OUTPATIENT)
Dept: PODIATRY | Facility: CLINIC | Age: 59
End: 2019-11-25

## 2019-11-25 NOTE — TELEPHONE ENCOUNTER
PA was approved. His card on file was under his wife's number, his card has ending three numbers that are different [phone] at the end. Instructed pt. from my understanding after talking to insurance company he should have own card and it will show the last three numbers are different, pt. Verbalized understanding. Instructed on next visit would be better if we could scan his card so we will have it on file.

## 2020-01-07 ENCOUNTER — TELEPHONE (OUTPATIENT)
Dept: ORTHOPEDICS | Facility: CLINIC | Age: 60
End: 2020-01-07

## 2020-01-07 ENCOUNTER — HOSPITAL ENCOUNTER (OUTPATIENT)
Dept: RADIOLOGY | Facility: HOSPITAL | Age: 60
Discharge: HOME | End: 2020-01-07
Attending: FAMILY MEDICINE
Payer: COMMERCIAL

## 2020-01-07 DIAGNOSIS — R52 PAIN: ICD-10-CM

## 2020-01-07 DIAGNOSIS — R52 PAIN: Primary | ICD-10-CM

## 2020-01-07 DIAGNOSIS — R60.9 SWELLING: ICD-10-CM

## 2020-01-07 PROCEDURE — 73562 XR KNEE 3 VIEW RIGHT: ICD-10-PCS | Mod: 26,RT,, | Performed by: RADIOLOGY

## 2020-01-07 PROCEDURE — 73562 X-RAY EXAM OF KNEE 3: CPT | Mod: TC,FY,RT

## 2020-01-07 PROCEDURE — 73562 X-RAY EXAM OF KNEE 3: CPT | Mod: 26,RT,, | Performed by: RADIOLOGY

## 2020-01-07 NOTE — TELEPHONE ENCOUNTER
Called patient to schedule referred appointment from Dr. Bell with Dr. Leos for treatment of right knee pain.     Appointment made and patient voiced understanding of appointment date, time, and location.

## 2020-03-19 ENCOUNTER — HOSPITAL ENCOUNTER (OUTPATIENT)
Dept: RADIOLOGY | Facility: HOSPITAL | Age: 60
Discharge: HOME | End: 2020-03-19
Attending: FAMILY MEDICINE
Payer: COMMERCIAL

## 2020-03-19 DIAGNOSIS — M54.9 UPPER BACK PAIN: Primary | ICD-10-CM

## 2020-03-19 DIAGNOSIS — M54.50 LOWER BACK PAIN: ICD-10-CM

## 2020-03-19 DIAGNOSIS — M54.9 UPPER BACK PAIN: ICD-10-CM

## 2020-03-19 PROCEDURE — 72070 X-RAY EXAM THORAC SPINE 2VWS: CPT | Mod: TC,FY

## 2020-03-19 PROCEDURE — 72070 XR THORACIC SPINE AP LATERAL: ICD-10-PCS | Mod: 26,,, | Performed by: RADIOLOGY

## 2020-03-19 PROCEDURE — 72070 X-RAY EXAM THORAC SPINE 2VWS: CPT | Mod: 26,,, | Performed by: RADIOLOGY

## 2020-03-19 PROCEDURE — 72110 XR LUMBAR SPINE COMPLETE 5 VIEW: ICD-10-PCS | Mod: 26,,, | Performed by: RADIOLOGY

## 2020-03-19 PROCEDURE — 72110 X-RAY EXAM L-2 SPINE 4/>VWS: CPT | Mod: 26,,, | Performed by: RADIOLOGY

## 2020-03-19 PROCEDURE — 72110 X-RAY EXAM L-2 SPINE 4/>VWS: CPT | Mod: TC,FY

## 2020-03-31 ENCOUNTER — TELEPHONE (OUTPATIENT)
Dept: SURGERY | Facility: CLINIC | Age: 60
End: 2020-03-31

## 2020-03-31 NOTE — TELEPHONE ENCOUNTER
----- Message from Neva  sent at 3/31/2020  1:16 PM CDT -----  Contact: pt/wife  Type: Needs Medical Advice    Who Called:      Best Call Back Number:   Additional Information: Requesting a call back from Nurse, regarding pt needs to know when can he be schedule to come in ,please call back with advice

## 2020-06-08 DIAGNOSIS — M54.50 LOW BACK PAIN: Primary | ICD-10-CM

## 2020-06-08 DIAGNOSIS — M53.9 MULTILEVEL DEGENERATIVE DISC DISEASE: ICD-10-CM

## 2020-06-11 ENCOUNTER — HOSPITAL ENCOUNTER (OUTPATIENT)
Dept: RADIOLOGY | Facility: HOSPITAL | Age: 60
Discharge: HOME | End: 2020-06-11
Attending: FAMILY MEDICINE
Payer: COMMERCIAL

## 2020-06-11 DIAGNOSIS — M53.9 MULTILEVEL DEGENERATIVE DISC DISEASE: ICD-10-CM

## 2020-06-11 DIAGNOSIS — M54.50 LOW BACK PAIN: ICD-10-CM

## 2020-06-11 PROCEDURE — 72148 MRI LUMBAR SPINE W/O DYE: CPT | Mod: TC

## 2020-06-11 PROCEDURE — 72148 MRI LUMBAR SPINE WITHOUT CONTRAST: ICD-10-PCS | Mod: 26,,, | Performed by: RADIOLOGY

## 2020-06-11 PROCEDURE — 72148 MRI LUMBAR SPINE W/O DYE: CPT | Mod: 26,,, | Performed by: RADIOLOGY

## 2020-12-08 ENCOUNTER — HOSPITAL ENCOUNTER (EMERGENCY)
Facility: HOSPITAL | Age: 60
Discharge: HOME | End: 2020-12-08
Attending: EMERGENCY MEDICINE
Payer: COMMERCIAL

## 2020-12-08 VITALS
HEART RATE: 72 BPM | WEIGHT: 300 LBS | OXYGEN SATURATION: 96 % | DIASTOLIC BLOOD PRESSURE: 58 MMHG | BODY MASS INDEX: 44.43 KG/M2 | RESPIRATION RATE: 22 BRPM | SYSTOLIC BLOOD PRESSURE: 107 MMHG | HEIGHT: 69 IN | TEMPERATURE: 99 F

## 2020-12-08 DIAGNOSIS — R56.9 SEIZURE-LIKE ACTIVITY: ICD-10-CM

## 2020-12-08 LAB
ALBUMIN SERPL BCP-MCNC: 3.7 G/DL (ref 3.5–5.2)
ALP SERPL-CCNC: 94 U/L (ref 55–135)
ALT SERPL W/O P-5'-P-CCNC: 36 U/L (ref 10–44)
AMPHET+METHAMPHET UR QL: NEGATIVE
ANION GAP SERPL CALC-SCNC: 9 MMOL/L (ref 8–16)
APTT BLDCRRT: 27.9 SEC (ref 21–32)
AST SERPL-CCNC: 24 U/L (ref 10–40)
BARBITURATES UR QL SCN>200 NG/ML: NEGATIVE
BASOPHILS # BLD AUTO: 0.04 K/UL (ref 0–0.2)
BASOPHILS NFR BLD: 0.5 % (ref 0–1.9)
BENZODIAZ UR QL SCN>200 NG/ML: NEGATIVE
BILIRUB SERPL-MCNC: 1.1 MG/DL (ref 0.1–1)
BILIRUB UR QL STRIP: NEGATIVE
BUN SERPL-MCNC: 21 MG/DL (ref 6–20)
BZE UR QL SCN: NEGATIVE
CALCIUM SERPL-MCNC: 9.3 MG/DL (ref 8.7–10.5)
CANNABINOIDS UR QL SCN: NEGATIVE
CHLORIDE SERPL-SCNC: 109 MMOL/L (ref 95–110)
CK SERPL-CCNC: 68 U/L (ref 20–200)
CLARITY UR: CLEAR
CO2 SERPL-SCNC: 24 MMOL/L (ref 23–29)
COLOR UR: YELLOW
CREAT SERPL-MCNC: 1.4 MG/DL (ref 0.5–1.4)
CREAT UR-MCNC: 65.8 MG/DL (ref 23–375)
DIFFERENTIAL METHOD: ABNORMAL
EOSINOPHIL # BLD AUTO: 0.4 K/UL (ref 0–0.5)
EOSINOPHIL NFR BLD: 4.5 % (ref 0–8)
ERYTHROCYTE [DISTWIDTH] IN BLOOD BY AUTOMATED COUNT: 14.3 % (ref 11.5–14.5)
EST. GFR  (AFRICAN AMERICAN): >60 ML/MIN/1.73 M^2
EST. GFR  (NON AFRICAN AMERICAN): 54.2 ML/MIN/1.73 M^2
ETHANOL SERPL-MCNC: <5 MG/DL
GLUCOSE SERPL-MCNC: 116 MG/DL (ref 70–110)
GLUCOSE UR QL STRIP: NEGATIVE
HCT VFR BLD AUTO: 42.3 % (ref 40–54)
HGB BLD-MCNC: 14 G/DL (ref 14–18)
HGB UR QL STRIP: NEGATIVE
IMM GRANULOCYTES # BLD AUTO: 0.02 K/UL (ref 0–0.04)
IMM GRANULOCYTES NFR BLD AUTO: 0.2 % (ref 0–0.5)
INR PPP: 1 (ref 0.8–1.2)
KETONES UR QL STRIP: NEGATIVE
LEUKOCYTE ESTERASE UR QL STRIP: NEGATIVE
LYMPHOCYTES # BLD AUTO: 1.6 K/UL (ref 1–4.8)
LYMPHOCYTES NFR BLD: 19 % (ref 18–48)
MCH RBC QN AUTO: 31 PG (ref 27–31)
MCHC RBC AUTO-ENTMCNC: 33.1 G/DL (ref 32–36)
MCV RBC AUTO: 94 FL (ref 82–98)
MONOCYTES # BLD AUTO: 0.8 K/UL (ref 0.3–1)
MONOCYTES NFR BLD: 9.7 % (ref 4–15)
NEUTROPHILS # BLD AUTO: 5.6 K/UL (ref 1.8–7.7)
NEUTROPHILS NFR BLD: 66.1 % (ref 38–73)
NITRITE UR QL STRIP: NEGATIVE
NRBC BLD-RTO: 0 /100 WBC
OPIATES UR QL SCN: NEGATIVE
PCP UR QL SCN>25 NG/ML: NEGATIVE
PH UR STRIP: 5 [PH] (ref 5–8)
PLATELET # BLD AUTO: 187 K/UL (ref 150–350)
PMV BLD AUTO: 10.2 FL (ref 9.2–12.9)
POCT GLUCOSE: 103 MG/DL (ref 70–110)
POTASSIUM SERPL-SCNC: 4.3 MMOL/L (ref 3.5–5.1)
PROT SERPL-MCNC: 6.9 G/DL (ref 6–8.4)
PROT UR QL STRIP: NEGATIVE
PROTHROMBIN TIME: 10.1 SEC (ref 9–12.5)
RBC # BLD AUTO: 4.51 M/UL (ref 4.6–6.2)
SODIUM SERPL-SCNC: 142 MMOL/L (ref 136–145)
SP GR UR STRIP: 1.02 (ref 1–1.03)
TOXICOLOGY INFORMATION: NORMAL
TROPONIN I SERPL DL<=0.01 NG/ML-MCNC: <0.01 NG/ML (ref 0.02–0.5)
TSH SERPL DL<=0.005 MIU/L-ACNC: 0.88 UIU/ML (ref 0.34–5.6)
URN SPEC COLLECT METH UR: NORMAL
UROBILINOGEN UR STRIP-ACNC: NEGATIVE EU/DL
WBC # BLD AUTO: 8.53 K/UL (ref 3.9–12.7)

## 2020-12-08 PROCEDURE — 80320 DRUG SCREEN QUANTALCOHOLS: CPT

## 2020-12-08 PROCEDURE — 81003 URINALYSIS AUTO W/O SCOPE: CPT | Mod: 59

## 2020-12-08 PROCEDURE — 85730 THROMBOPLASTIN TIME PARTIAL: CPT

## 2020-12-08 PROCEDURE — 85025 COMPLETE CBC W/AUTO DIFF WBC: CPT

## 2020-12-08 PROCEDURE — 70450 CT HEAD/BRAIN W/O DYE: CPT | Mod: 26,,, | Performed by: RADIOLOGY

## 2020-12-08 PROCEDURE — 99285 EMERGENCY DEPT VISIT HI MDM: CPT | Mod: 25

## 2020-12-08 PROCEDURE — 84484 ASSAY OF TROPONIN QUANT: CPT

## 2020-12-08 PROCEDURE — 84443 ASSAY THYROID STIM HORMONE: CPT

## 2020-12-08 PROCEDURE — 80053 COMPREHEN METABOLIC PANEL: CPT

## 2020-12-08 PROCEDURE — 82962 GLUCOSE BLOOD TEST: CPT

## 2020-12-08 PROCEDURE — 70450 CT HEAD/BRAIN W/O DYE: CPT | Mod: TC

## 2020-12-08 PROCEDURE — 80307 DRUG TEST PRSMV CHEM ANLYZR: CPT

## 2020-12-08 PROCEDURE — 70450 CT HEAD WITHOUT CONTRAST: ICD-10-PCS | Mod: 26,,, | Performed by: RADIOLOGY

## 2020-12-08 PROCEDURE — 82550 ASSAY OF CK (CPK): CPT

## 2020-12-08 PROCEDURE — 93005 ELECTROCARDIOGRAM TRACING: CPT

## 2020-12-08 PROCEDURE — 85610 PROTHROMBIN TIME: CPT

## 2020-12-08 NOTE — ED TRIAGE NOTES
Pt sent to ED for eval after pt had reported seizure this am. Pt states he had seizures years ago but has not been on medications or had any since.

## 2020-12-14 NOTE — ED PROVIDER NOTES
Encounter Date: 2020       History     Chief Complaint   Patient presents with    Seizures     60 year old male with past medical history significant for hyperlipidemia, diabetes mellitus, gout, and remote history of intermittent seizure disorder presents to the ED for evaluation of possible seizure like activity this morning. This was unwitnessed but the wife denies any postictal phase to her knowledge. He states he had seizures years ago but has not been on medications or had any since. Denies fever, chills, recent illness. Denies dyspnea, palpitations, diaphoresis, chest pain. Denies headache, LOC, numbness, tingling, weakness, incontinence.         Review of patient's allergies indicates:   Allergen Reactions    Pcn [penicillins]      Past Medical History:   Diagnosis Date    Arthritis     Diabetes mellitus     Edema, lower extremity     Gout     Hyperlipidemia     Stasis ulcer of lower extremity, left      Past Surgical History:   Procedure Laterality Date    KNEE ARTHROSCOPY W/ MENISCAL REPAIR      left knee meniscus repair      left shoulder rotator cuff repair      SHOULDER ARTHROSCOPY      TONSILLECTOMY       Family History   Problem Relation Age of Onset    No Known Problems Mother     Diabetes Mellitus Father     No Known Problems Sister     No Known Problems Brother     No Known Problems Daughter     No Known Problems Son     No Known Problems Maternal Aunt     No Known Problems Maternal Uncle     No Known Problems Paternal Aunt     No Known Problems Paternal Uncle     No Known Problems Maternal Grandmother     No Known Problems Maternal Grandfather     No Known Problems Paternal Grandmother     No Known Problems Paternal Grandfather      Social History     Tobacco Use    Smoking status: Former Smoker     Packs/day: 0.50     Years: 40.00     Pack years: 20.00     Types: Cigarettes     Quit date: 2018     Years since quittin.9    Smokeless tobacco: Never Used    Substance Use Topics    Alcohol use: No    Drug use: No     Review of Systems   Constitutional: Negative for appetite change, chills, diaphoresis, fatigue and fever.   HENT: Negative for congestion, ear pain, rhinorrhea, sinus pressure, sinus pain, sore throat and tinnitus.    Eyes: Negative for photophobia and visual disturbance.   Respiratory: Negative for cough, chest tightness, shortness of breath and wheezing.    Cardiovascular: Negative for chest pain, palpitations and leg swelling.   Gastrointestinal: Negative for abdominal pain, constipation, diarrhea, nausea and vomiting.   Endocrine: Negative for cold intolerance, heat intolerance, polydipsia, polyphagia and polyuria.   Genitourinary: Negative for decreased urine volume, difficulty urinating, dysuria, flank pain, frequency, hematuria and urgency.   Musculoskeletal: Negative for arthralgias, back pain, gait problem, joint swelling, myalgias, neck pain and neck stiffness.   Skin: Negative for color change, pallor, rash and wound.   Allergic/Immunologic: Negative for immunocompromised state.   Neurological: Positive for seizures. Negative for dizziness, syncope, weakness, light-headedness, numbness and headaches.   Hematological: Negative for adenopathy. Does not bruise/bleed easily.   Psychiatric/Behavioral: Negative for decreased concentration, dysphoric mood and sleep disturbance. The patient is not nervous/anxious.    All other systems reviewed and are negative.      Physical Exam     Initial Vitals [12/08/20 1305]   BP Pulse Resp Temp SpO2   (!) 135/99 88 20 98.7 °F (37.1 °C) 97 %      MAP       --         Physical Exam    Nursing note and vitals reviewed.  Constitutional: He appears well-developed and well-nourished. He is not diaphoretic. No distress.   HENT:   Head: Normocephalic and atraumatic.   Right Ear: External ear normal.   Left Ear: External ear normal.   Nose: Nose normal.   Mouth/Throat: Oropharynx is clear and moist.   Eyes:  Conjunctivae are normal. Pupils are equal, round, and reactive to light. No scleral icterus.   Neck: Normal range of motion. Neck supple. No JVD present.   Cardiovascular: Normal rate, regular rhythm, normal heart sounds and intact distal pulses.   Pulmonary/Chest: Breath sounds normal. No respiratory distress. He has no wheezes. He has no rhonchi. He has no rales. He exhibits no tenderness.   Abdominal: Soft. Bowel sounds are normal. He exhibits no distension. There is no abdominal tenderness. There is no rebound and no guarding.   Musculoskeletal: Normal range of motion. No tenderness or edema.   Lymphadenopathy:     He has no cervical adenopathy.   Neurological: He is alert and oriented to person, place, and time. GCS score is 15. GCS eye subscore is 4. GCS verbal subscore is 5. GCS motor subscore is 6.   Skin: Skin is warm and dry. Capillary refill takes less than 2 seconds. No rash and no abscess noted. No erythema. No pallor.   Psychiatric: He has a normal mood and affect. His behavior is normal. Judgment and thought content normal.         ED Course   Procedures  Labs Reviewed   CBC W/ AUTO DIFFERENTIAL - Abnormal; Notable for the following components:       Result Value    RBC 4.51 (*)     All other components within normal limits   COMPREHENSIVE METABOLIC PANEL - Abnormal; Notable for the following components:    Glucose 116 (*)     BUN 21 (*)     Total Bilirubin 1.1 (*)     eGFR if non  54.2 (*)     All other components within normal limits   TROPONIN I - Abnormal; Notable for the following components:    Troponin I <0.01 (*)     All other components within normal limits   APTT   URINALYSIS, REFLEX TO URINE CULTURE    Narrative:     Specimen Source->Urine   TSH   PROTIME-INR   CK   ALCOHOL,MEDICAL (ETHANOL)   DRUG SCREEN PANEL, URINE EMERGENCY    Narrative:     Specimen Source->Urine   POCT GLUCOSE     EKG Readings: (Independently Interpreted)   Initial Reading: No STEMI. Rhythm: Normal  Sinus Rhythm. Heart Rate: 77. Ectopy: No Ectopy. Conduction: Normal. ST Segments: Normal ST Segments. T Waves: Normal. Axis: Normal. Clinical Impression: Normal Sinus Rhythm     ECG Results          EKG 12-lead (Final result)  Result time 12/09/20 10:09:36    Final result by Interface, Lab In [facility] (12/09/20 10:09:36)                 Narrative:    Test Reason : R56.9,    Vent. Rate : 077 BPM     Atrial Rate : 077 BPM     P-R Int : 156 ms          QRS Dur : 100 ms      QT Int : 396 ms       P-R-T Axes : 048 002 -15 degrees     QTc Int : 448 ms    Normal sinus rhythm  Low voltage QRS  Abnormal ECG  No previous ECGs available  Confirmed by Blaise Rodriguez MD ([id number]) on 12/9/2020 10:09:19 AM    Referred By: RYANN   SELF           Confirmed By:Blaise Rodriguez MD                            Imaging Results          CT Head Without Contrast (Final result)  Result time 12/08/20 14:30:38    Final result by Dmitriy Jack MD (12/08/20 14:30:38)                 Impression:      No acute intracranial abnormality.  Mild generalized cerebral atrophy.      Electronically signed by: Dmitriy Jack  Date:    12/08/2020  Time:    14:30             Narrative:    EXAMINATION:  CT HEAD WITHOUT CONTRAST    CLINICAL HISTORY:  Seizure, nontraumatic (Age => 41y);    TECHNIQUE:  Low dose axial images were obtained through the head.  Coronal and sagittal reformations were also performed. Contrast was not administered.    COMPARISON:  None.    FINDINGS:  Mild cerebral involutional change.  Normal size ventricular system.  No hemorrhage or major vascular distribution infarct. No intra or extra-axial mass. No mass effect or midline shift. Included orbits are unremarkable. Paranasal sinuses and mastoid air cells are clear. No acute osseous abnormality.  Partially empty sella turcica.                              X-Rays:   Independently Interpreted Readings:   Head CT: No hemorrhage.  No skull fracture.  No acute stroke.     Medical Decision  Making:   Differential Diagnosis:   Acute cerebrovascular accident, transient ischemic attack, hypoglycemia, syncope, near syncope, acute renal failure, acute myocardial infarction, coronavirus, pneumonia, sepsis, severe sepsis, septic shock, acute intracranial hemorrhage, increased intracranial pressure, orthostatic hypotension, seizure disorder, hypoxic brain injury  ED Management:  Reassessment at the time of disposition demonstrates that the pt is in no acute distress. He has remained hemodynamically stable throughout the entire ED visit and is without objective evidence for acute process requiring urgent intervention or hospitalization.  The pt is stable for discharge, counseling is provided as documented below, discussed symptomatic treatment and specific conditions for return.                             Clinical Impression:       ICD-10-CM ICD-9-CM   1. Seizure-like activity  R56.9 780.39                      Disposition:   Disposition: Discharged  Condition: Stable     ED Disposition Condition    Discharge Stable        ED Prescriptions     None        Follow-up Information     Follow up With Specialties Details Why Contact Info    Jeromy Ray MD Family Medicine Schedule an appointment as soon as possible for a visit in 1 week  [address] [address]  [phone]                                         Jerrica Hall MD  12/13/20 [id number]

## 2022-01-07 ENCOUNTER — LAB VISIT (OUTPATIENT)
Dept: FAMILY MEDICINE | Facility: CLINIC | Age: 62
End: 2022-01-07

## 2022-01-07 DIAGNOSIS — Z01.818 PRE-OP TESTING: ICD-10-CM

## 2022-01-07 PROCEDURE — U0003 INFECTIOUS AGENT DETECTION BY NUCLEIC ACID (DNA OR RNA); SEVERE ACUTE RESPIRATORY SYNDROME CORONAVIRUS 2 (SARS-COV-2) (CORONAVIRUS DISEASE [COVID-19]), AMPLIFIED PROBE TECHNIQUE, MAKING USE OF HIGH THROUGHPUT TECHNOLOGIES AS DESCRIBED BY CMS-2020-01-R: HCPCS | Performed by: FAMILY MEDICINE

## 2022-01-07 PROCEDURE — U0005 INFEC AGEN DETEC AMPLI PROBE: HCPCS | Performed by: FAMILY MEDICINE

## 2022-01-07 NOTE — PROGRESS NOTES
Earle Hoff presented to clinic for COVID-19 swab.   Earle Hoff verified x2, name and .   Earle Hoff instructed on what will be completed, asked if ever had COVID-19 swab.   Explained procedure to Earle Hoff.   Specimen obtained.   No questions or concerns voiced further at this time.   Earle Hoff left in satisfactory condition.

## 2022-01-08 LAB
SARS-COV-2 RNA RESP QL NAA+PROBE: NOT DETECTED
SARS-COV-2- CYCLE NUMBER: NORMAL

## 2022-02-22 ENCOUNTER — HOSPITAL ENCOUNTER (OUTPATIENT)
Dept: RADIOLOGY | Facility: HOSPITAL | Age: 62
Discharge: HOME | End: 2022-02-22
Attending: FAMILY MEDICINE
Payer: COMMERCIAL

## 2022-02-22 DIAGNOSIS — M54.9 BACK PAIN: Primary | ICD-10-CM

## 2022-02-22 DIAGNOSIS — M25.561 RIGHT KNEE PAIN: ICD-10-CM

## 2022-02-22 DIAGNOSIS — M54.9 BACK PAIN: ICD-10-CM

## 2022-02-22 DIAGNOSIS — M25.561 RIGHT KNEE PAIN: Primary | ICD-10-CM

## 2022-02-22 PROCEDURE — 71046 XR CHEST PA AND LATERAL: ICD-10-PCS | Mod: 26,,, | Performed by: RADIOLOGY

## 2022-02-22 PROCEDURE — 71046 X-RAY EXAM CHEST 2 VIEWS: CPT | Mod: TC,FY

## 2022-02-22 PROCEDURE — 71046 X-RAY EXAM CHEST 2 VIEWS: CPT | Mod: 26,,, | Performed by: RADIOLOGY

## 2022-02-22 PROCEDURE — 73562 X-RAY EXAM OF KNEE 3: CPT | Mod: TC,FY,RT

## 2022-02-22 PROCEDURE — 73562 XR KNEE 3 VIEW RIGHT: ICD-10-PCS | Mod: 26,RT,, | Performed by: RADIOLOGY

## 2022-02-22 PROCEDURE — 72070 XR THORACIC SPINE AP LATERAL: ICD-10-PCS | Mod: 26,,, | Performed by: RADIOLOGY

## 2022-02-22 PROCEDURE — 72070 X-RAY EXAM THORAC SPINE 2VWS: CPT | Mod: TC,FY

## 2022-02-22 PROCEDURE — 72070 X-RAY EXAM THORAC SPINE 2VWS: CPT | Mod: 26,,, | Performed by: RADIOLOGY

## 2022-02-22 PROCEDURE — 73562 X-RAY EXAM OF KNEE 3: CPT | Mod: 26,RT,, | Performed by: RADIOLOGY

## 2022-02-23 ENCOUNTER — OFFICE VISIT (OUTPATIENT)
Dept: ORTHOPEDICS | Facility: CLINIC | Age: 62
End: 2022-02-23
Payer: COMMERCIAL

## 2022-02-23 ENCOUNTER — TELEPHONE (OUTPATIENT)
Dept: FAMILY MEDICINE | Facility: CLINIC | Age: 62
End: 2022-02-23
Payer: COMMERCIAL

## 2022-02-23 VITALS — BODY MASS INDEX: 44.43 KG/M2 | HEIGHT: 69 IN | WEIGHT: 300 LBS

## 2022-02-23 DIAGNOSIS — M23.203 DEGENERATIVE TEAR OF MEDIAL MENISCUS, RIGHT: Primary | ICD-10-CM

## 2022-02-23 DIAGNOSIS — M17.11 PRIMARY OSTEOARTHRITIS OF RIGHT KNEE: ICD-10-CM

## 2022-02-23 PROCEDURE — 3008F PR BODY MASS INDEX (BMI) DOCUMENTED: ICD-10-PCS | Mod: S$GLB,,, | Performed by: PHYSICIAN ASSISTANT

## 2022-02-23 PROCEDURE — 3008F BODY MASS INDEX DOCD: CPT | Mod: S$GLB,,, | Performed by: PHYSICIAN ASSISTANT

## 2022-02-23 PROCEDURE — 20610 DRAIN/INJ JOINT/BURSA W/O US: CPT | Mod: RT,S$GLB,, | Performed by: PHYSICIAN ASSISTANT

## 2022-02-23 PROCEDURE — 20610 LARGE JOINT ASPIRATION/INJECTION: R KNEE: ICD-10-PCS | Mod: RT,S$GLB,, | Performed by: PHYSICIAN ASSISTANT

## 2022-02-23 PROCEDURE — 1160F RVW MEDS BY RX/DR IN RCRD: CPT | Mod: S$GLB,,, | Performed by: PHYSICIAN ASSISTANT

## 2022-02-23 PROCEDURE — 1160F PR REVIEW ALL MEDS BY PRESCRIBER/CLIN PHARMACIST DOCUMENTED: ICD-10-PCS | Mod: S$GLB,,, | Performed by: PHYSICIAN ASSISTANT

## 2022-02-23 PROCEDURE — 99203 OFFICE O/P NEW LOW 30 MIN: CPT | Mod: 25,S$GLB,, | Performed by: PHYSICIAN ASSISTANT

## 2022-02-23 PROCEDURE — 99203 PR OFFICE/OUTPT VISIT, NEW, LEVL III, 30-44 MIN: ICD-10-PCS | Mod: 25,S$GLB,, | Performed by: PHYSICIAN ASSISTANT

## 2022-02-23 RX ORDER — TRIAMCINOLONE ACETONIDE 40 MG/ML
40 INJECTION, SUSPENSION INTRA-ARTICULAR; INTRAMUSCULAR
Status: DISCONTINUED | OUTPATIENT
Start: 2022-02-23 | End: 2022-02-23 | Stop reason: HOSPADM

## 2022-02-23 RX ADMIN — TRIAMCINOLONE ACETONIDE 40 MG: 40 INJECTION, SUSPENSION INTRA-ARTICULAR; INTRAMUSCULAR at 12:02

## 2022-02-23 NOTE — TELEPHONE ENCOUNTER
----- Message from Amanda Rothman sent at 2/23/2022  8:46 AM CST -----  Regarding: Ortho referral-right knee, osteoarthrosis  Hi good morning -    Spoke to pt and he stated he cannot wait until march 14 which is the next avail date, he said he cannot walk and is in extreme pain and knee is very swollen, l offered him slidell and another provider all was in the next week and he wanted to be seen in a day or so, let me know if you can see patient or the best way to schedule patient if that is the 1st avail.

## 2022-02-23 NOTE — TELEPHONE ENCOUNTER
Message sent back to Amanda Rothman informing her Dr. Leos does not have anything available today.

## 2022-02-23 NOTE — PROGRESS NOTES
[facility]  [address]  [place] [address]  Phone: [phone]   [phone]    Patient's PCP: Jeromy Ray MD  Referring Provider: No ref. provider found    Subjective:      Chief Complaint:   Chief Complaint   Patient presents with    Right Knee - Pain     Pt is here with complaints of Rt. Knee pain X 5 days, fell and twisted it getting up. Knee gives out, has popping and grinding, swelling and painful, having calf and thigh pain.        Past Medical History:   Diagnosis Date    Arthritis     Diabetes mellitus     Edema, lower extremity     Gout     Hyperlipidemia     Stasis ulcer of lower extremity, left        Past Surgical History:   Procedure Laterality Date    KNEE ARTHROSCOPY W/ MENISCAL REPAIR      left knee meniscus repair      left shoulder rotator cuff repair      SHOULDER ARTHROSCOPY      TONSILLECTOMY         Current Outpatient Medications   Medication Sig    EUCRISA 2 % Oint Apply 8 g topically once daily.    furosemide (LASIX) 20 MG tablet     hydrOXYzine HCl (ATARAX) 25 MG tablet     pregabalin (LYRICA) 75 MG capsule Take 1 capsule (75 mg total) by mouth 3 (three) times daily.    simvastatin (ZOCOR) 20 MG tablet      No current facility-administered medications for this visit.       Review of patient's allergies indicates:   Allergen Reactions    Pcn [penicillins]        Family History   Problem Relation Age of Onset    No Known Problems Mother     Diabetes Mellitus Father     No Known Problems Sister     No Known Problems Brother     No Known Problems Daughter     No Known Problems Son     No Known Problems Maternal Aunt     No Known Problems Maternal Uncle     No Known Problems Paternal Aunt     No Known Problems Paternal Uncle     No Known Problems Maternal Grandmother     No Known Problems Maternal Grandfather     No Known Problems Paternal Grandmother     No Known Problems Paternal Grandfather        Social History      Socioeconomic History    Marital status:    Tobacco Use    Smoking status: Former Smoker     Packs/day: 0.50     Years: 40.00     Pack years: 20.00     Types: Cigarettes     Quit date: 12/31/2018     Years since quitting: 3.1    Smokeless tobacco: Never Used   Substance and Sexual Activity    Alcohol use: No    Drug use: No    Sexual activity: Yes       History of present illness:  Mr. Og presents to the clinic today as a new patient with a chief complaint of right knee pain and swelling after a fall approximately 5 days ago.  He was seen by his primary care provider, x-rayed, given a prescription for Ultram, and referred to Orthopedics for further evaluation and treatment of his right knee.  He presents to the clinic today ambulating via wheelchair.  He normally weightbear as tolerated on his bilateral lower extremities without difficulty.    Review of Systems:    Constitutional: Negative for chills, fever and weight loss.   HENT: Negative for congestion.    Eyes: Negative for discharge and redness.   Respiratory: Negative for cough and shortness of breath.    Cardiovascular: Negative for chest pain.   Gastrointestinal: Negative for nausea and vomiting.   Musculoskeletal: See HPI.   Skin: Negative for rash.   Neurological: Negative for headaches.   Endo/Heme/Allergies: Does not bruise/bleed easily.   Psychiatric/Behavioral: The patient is not nervous/anxious.    All other systems reviewed and are negative.       Objective:      Physical Examination:    Vital Signs:  There were no vitals filed for this visit.    Body mass index is 44.3 kg/m².    This a well-developed, well nourished patient in no acute distress.  They are alert and oriented and cooperative to examination.     Right knee exam:  Skin to right knee is clean dry and intact.  There is no erythema or ecchymosis.  There are no signs or symptoms of infection.  Patient is neurovascularly intact throughout the right lower extremity.   Right calf is soft and nontender.  Patient does have a large effusion of his right knee.  Patient's extensor mechanism is intact to his right knee.  He is unable to fully extend actively secondary to pain.  Passively I can extend him to 0 and he can hold it there.  Actively can flex to approximately 90°.  He has limited secondary to pain and swelling.  His right knee is diffusely tender to palpation, primarily along the medial aspect.    Pertinent New Results:        XRAY Report / Interpretation:   Two views were taken of his right knee today:  Sunrise an AP standing.  These were used in viewed in conjunction with x-rays taken by his primary care provider yesterday.  They reveal no acute fractures or dislocations.  Patient does have severe arthrosis in the medial compartment of his right knee with bone-on-bone deformity and multiple osteophytes present.  He does have extensive calcific tendinitis extending from the superior pole of his patella into the quadriceps tendon and the inferior pole of his patella into the patellar tendon.          Assessment:       1. Degenerative tear of medial meniscus, right    2. Primary osteoarthritis of right knee      Plan:     Degenerative tear of medial meniscus, right  -     X-Ray Knee 1 or 2 View Right  -     Large Joint Aspiration/Injection: R knee    Primary osteoarthritis of right knee  -     Large Joint Aspiration/Injection: R knee        Follow up in about 6 weeks (around 4/6/2022) for right knee inj f/u, TUES/THURS.    I injected his right knee today via anterior medial approach with 40 mg of Kenalog and lidocaine.  He tolerated this well.  I did have a discussion with him today about the severity of the osteoarthritis in his knee and the definitive treatment recommendation would be total knee arthroplasty at some point in the near future.  I would like to see him back fairly soon, in 6 weeks to ensure he is doing well from this injection.  I would like him to follow up on  a day where he can meet Dr. Ortiz and further discuss treatment options for his knee osteoarthritis, specifically total knee arthroplasty.  I advised him if he was not feeling a great bit better within 2 weeks from this injection, he should follow-up sooner.  Reports he does have a prescription of tramadol from his primary care provider for his pain and that is efficacious for him.        Adama Howard, JOSÉ MIGUELS, PA-C    This note was created using [employer] voice recognition software that occasionally misinterprets words or phrases.

## 2022-02-23 NOTE — PROCEDURES
Large Joint Aspiration/Injection: R knee    Date/Time: 2/23/2022 12:30 PM  Performed by: Adama Howard PA-C  Authorized by: Adama Howard PA-C     Consent Done?:  Yes (Verbal)  Indications:  Pain  Site marked: the procedure site was marked    Timeout: prior to procedure the correct patient, procedure, and site was verified    Prep: patient was prepped and draped in usual sterile fashion      Local anesthesia used?: Yes    Local anesthetic:  Lidocaine 1% without epinephrine  Ultrasonic Guidance for needle placement?: No    Location:  Knee  Site:  R knee  Medications:  40 mg triamcinolone acetonide 40 mg/mL  Patient tolerance:  Patient tolerated the procedure well with no immediate complications

## 2022-03-07 ENCOUNTER — OFFICE VISIT (OUTPATIENT)
Dept: ORTHOPEDICS | Facility: CLINIC | Age: 62
End: 2022-03-07
Payer: COMMERCIAL

## 2022-03-07 VITALS — WEIGHT: 300 LBS | HEIGHT: 69 IN | BODY MASS INDEX: 44.43 KG/M2

## 2022-03-07 DIAGNOSIS — M47.814 THORACIC SPONDYLOSIS: ICD-10-CM

## 2022-03-07 DIAGNOSIS — M47.816 LUMBAR SPONDYLOSIS: Primary | ICD-10-CM

## 2022-03-07 DIAGNOSIS — M47.812 CERVICAL SPONDYLOSIS: ICD-10-CM

## 2022-03-07 PROCEDURE — 3008F BODY MASS INDEX DOCD: CPT | Mod: S$GLB,,, | Performed by: ORTHOPAEDIC SURGERY

## 2022-03-07 PROCEDURE — 99213 PR OFFICE/OUTPT VISIT, EST, LEVL III, 20-29 MIN: ICD-10-PCS | Mod: S$GLB,,, | Performed by: ORTHOPAEDIC SURGERY

## 2022-03-07 PROCEDURE — 99213 OFFICE O/P EST LOW 20 MIN: CPT | Mod: S$GLB,,, | Performed by: ORTHOPAEDIC SURGERY

## 2022-03-07 PROCEDURE — 1160F PR REVIEW ALL MEDS BY PRESCRIBER/CLIN PHARMACIST DOCUMENTED: ICD-10-PCS | Mod: S$GLB,,, | Performed by: ORTHOPAEDIC SURGERY

## 2022-03-07 PROCEDURE — 1160F RVW MEDS BY RX/DR IN RCRD: CPT | Mod: S$GLB,,, | Performed by: ORTHOPAEDIC SURGERY

## 2022-03-07 PROCEDURE — 3008F PR BODY MASS INDEX (BMI) DOCUMENTED: ICD-10-PCS | Mod: S$GLB,,, | Performed by: ORTHOPAEDIC SURGERY

## 2022-03-07 RX ORDER — MELOXICAM 7.5 MG/1
7.5 TABLET ORAL DAILY
Qty: 30 TABLET | Refills: 1 | Status: CANCELLED | OUTPATIENT
Start: 2022-03-07 | End: 2022-04-06

## 2022-03-07 RX ORDER — TRAMADOL HYDROCHLORIDE 50 MG/1
50 TABLET ORAL EVERY 8 HOURS PRN
Qty: 21 TABLET | Refills: 0 | Status: SHIPPED | OUTPATIENT
Start: 2022-03-07 | End: 2022-03-14

## 2022-03-07 RX ORDER — TRAMADOL HYDROCHLORIDE 50 MG/1
50 TABLET ORAL EVERY 6 HOURS
COMMUNITY
End: 2022-05-11

## 2022-03-07 RX ORDER — ATORVASTATIN CALCIUM 20 MG/1
20 TABLET, FILM COATED ORAL
COMMUNITY
Start: 2022-03-03 | End: 2022-11-28

## 2022-03-07 NOTE — PROGRESS NOTES
Subjective:       Patient ID: Earle Hoff is a 61 y.o. male.    Chief Complaint: Pain of the Lumbar Spine (Lumbar pain that has been going on for a while. Lately, more movement has caused more pain. No leg pain.), Pain of the Thoracic Spine (Has been having pain in his T spine for a while now. Very painful to move especially at night. Waking him up from sleep with stabbing pain. Causing his arm left arm to go numb), and Pain of the Neck (Cervical pain that has been going on for a while. Causing his left arm to go numb)      History of Present Illness    Prior to meeting with the patient I reviewed the medical chart in [facility]. This included reviewing the previous progress notes from our office, review of the patient's last appointment with their primary care provider, review of any visits to the emergency room, and review of any pain management appointments or procedures.   Chronic history of cervical thoracic lumbar pain of several months without radiation.  Has a history of arthritis of the knee treated elsewhere.  No bowel or bladder dysfunction.  Has poor kidney function and cannot take anti-inflammatory medications.    Current Medications  Current Outpatient Medications   Medication Sig Dispense Refill    atorvastatin (LIPITOR) 20 MG tablet 20 mg.      EUCRISA 2 % Oint Apply 8 g topically once daily. 60 g 4    furosemide (LASIX) 20 MG tablet       hydrOXYzine HCl (ATARAX) 25 MG tablet       simvastatin (ZOCOR) 20 MG tablet       traMADoL (ULTRAM) 50 mg tablet Take 50 mg by mouth every 6 (six) hours.      pregabalin (LYRICA) 75 MG capsule Take 1 capsule (75 mg total) by mouth 3 (three) times daily. 90 capsule 2    traMADoL (ULTRAM) 50 mg tablet Take 1 tablet (50 mg total) by mouth every 8 (eight) hours as needed for Pain. 21 tablet 0     No current facility-administered medications for this visit.       Allergies  Review of patient's allergies indicates:   Allergen Reactions    Pcn [penicillins]         Past Medical History  Past Medical History:   Diagnosis Date    Arthritis     Diabetes mellitus     Edema, lower extremity     Gout     Hyperlipidemia     Stasis ulcer of lower extremity, left        Surgical History  Past Surgical History:   Procedure Laterality Date    KNEE ARTHROSCOPY W/ MENISCAL REPAIR      left knee meniscus repair      left shoulder rotator cuff repair      SHOULDER ARTHROSCOPY      TONSILLECTOMY         Family History:   Family History   Problem Relation Age of Onset    No Known Problems Mother     Diabetes Mellitus Father     No Known Problems Sister     No Known Problems Brother     No Known Problems Daughter     No Known Problems Son     No Known Problems Maternal Aunt     No Known Problems Maternal Uncle     No Known Problems Paternal Aunt     No Known Problems Paternal Uncle     No Known Problems Maternal Grandmother     No Known Problems Maternal Grandfather     No Known Problems Paternal Grandmother     No Known Problems Paternal Grandfather        Social History:   Social History     Socioeconomic History    Marital status:    Tobacco Use    Smoking status: Former Smoker     Packs/day: 0.50     Years: 40.00     Pack years: 20.00     Types: Cigarettes     Quit date: 12/31/2018     Years since quitting: 3.1    Smokeless tobacco: Never Used   Substance and Sexual Activity    Alcohol use: No    Drug use: No    Sexual activity: Yes       Hospitalization/Major Diagnostic Procedure:     Review of Systems     General/Constitutional:  Chills denies. Fatigue denies. Fever denies. Weight gain denies. Weight loss denies.    Respiratory:  Shortness of breath denies.    Cardiovascular:  Chest pain denies.    Gastrointestinal:  Constipation denies. Diarrhea denies. Nausea denies. Vomiting denies.     Hematology:  Easy bruising denies. Prolonged bleeding denies.     Genitourinary:  Frequent urination denies. Pain in lower back denies. Painful urination  denies.     Musculoskeletal:  See HPI for details    Skin:  Rash denies.    Neurologic:  Dizziness denies. Gait abnormalities denies. Seizures denies. Tingling/Numbess denies.    Psychiatric:  Anxiety denies. Depressed mood denies.     Objective:   Vital Signs: There were no vitals filed for this visit.     Physical Exam      General Examination:     Constitutional: The patient is alert and oriented to lace person and time. Mood is pleasant.     Head/Face: Normal facial features normal eyebrows    Eyes: Normal extraocular motion bilaterally    Lungs: Respirations are equal and unlabored    Gait is coordinated.    Cardiovascular: There are no swelling or varicosities present.    Lymphatic: Negative for adenopathy    Skin: Normal    Neurological: Level of consciousness normal. Oriented to place person and time and situation    Psychiatric: Oriented to time place person and situation    Moderate tenderness midthoracic area the spinous processes and right paraspinous muscles lower cervical and thoracic region cervical range of motion normal thoracic pain with bending reproducing pain pain with extension and bending tenderness over both posterior iliac spines lumbar spine without spasm    XRAY Report/ Interpretation :  AP and lateral x-rays of the cervical spine were performed today. Indications neck pain. Findings:  Multilevel anterior osteophytes and disc space narrowing consistent with longstanding cervical spondylosis  AP and lateral x-rays of lumbar spine will performed today. Indications low back pain. Findings:  Multilevel anterior osteophytes and disc space narrowing consistent with longstanding lumbar spondylosis      Assessment:       1. Lumbar spondylosis    2. Cervical spondylosis    3. Thoracic spondylosis        Plan:       Earle was seen today for pain, pain and pain.    Diagnoses and all orders for this visit:    Lumbar spondylosis  -     X-Ray Lumbar Spine Ap And Lateral  -     X-Ray Pelvis Routine  AP  -     Ambulatory referral/consult to Physical/Occupational Therapy; Future  -     traMADoL (ULTRAM) 50 mg tablet; Take 1 tablet (50 mg total) by mouth every 8 (eight) hours as needed for Pain.    Cervical spondylosis  -     X-Ray Cervical Spine AP And Lateral  -     traMADoL (ULTRAM) 50 mg tablet; Take 1 tablet (50 mg total) by mouth every 8 (eight) hours as needed for Pain.    Thoracic spondylosis  -     Ambulatory referral/consult to Physical/Occupational Therapy; Future  -     traMADoL (ULTRAM) 50 mg tablet; Take 1 tablet (50 mg total) by mouth every 8 (eight) hours as needed for Pain.    Other orders  The following orders have not been finalized:  -     Cancel: meloxicam (MOBIC) 7.5 MG tablet         Follow up in about 6 weeks (around 4/18/2022) for PT T/sp and Ls/p f/u.    Patient is axial spinal pain treatment options were discussed prescription for tramadol given for short-term pain relief since patient cannot take anti-inflammatory medicines.  Referral for outpatient physical therapy thoracic lumbar spine return visit 6 weeks if still symptomatic consider MRI thoracic and/or lumbar regions and referred to pain management.  Patient not a good surgical candidate.    Treatment options were discussed with regards to the nature of the medical condition. Conservative pain intervention and surgical options were discussed in detail. The probability of success of each separate treatment option was discussed. The patient expressed a clear understanding of the treatment options. With regards to surgery, the procedure risk, benefits, complications, and outcomes were discussed. No guarantees were given with regards to surgical outcome.   The risk of complications, morbidity, and mortality of patient management decisions have been made at the time of this visit. These are associated with the patient's problems, diagnostic procedures and treatment options. This includes the possible management options selected and  those considered but not selected by the patient after shared medical decision making we discussed with the patient.   This note was created using Dragon voice recognition software that occasionally misinterpreted phrases or words.

## 2022-04-07 ENCOUNTER — OFFICE VISIT (OUTPATIENT)
Dept: ORTHOPEDICS | Facility: CLINIC | Age: 62
End: 2022-04-07
Payer: COMMERCIAL

## 2022-04-07 VITALS — BODY MASS INDEX: 44.43 KG/M2 | HEIGHT: 69 IN | WEIGHT: 300 LBS

## 2022-04-07 DIAGNOSIS — M17.11 PRIMARY OSTEOARTHRITIS OF RIGHT KNEE: Primary | ICD-10-CM

## 2022-04-07 PROCEDURE — 99213 PR OFFICE/OUTPT VISIT, EST, LEVL III, 20-29 MIN: ICD-10-PCS | Mod: S$GLB,,, | Performed by: PHYSICIAN ASSISTANT

## 2022-04-07 PROCEDURE — 3008F BODY MASS INDEX DOCD: CPT | Mod: S$GLB,,, | Performed by: PHYSICIAN ASSISTANT

## 2022-04-07 PROCEDURE — 99213 OFFICE O/P EST LOW 20 MIN: CPT | Mod: S$GLB,,, | Performed by: PHYSICIAN ASSISTANT

## 2022-04-07 PROCEDURE — 1160F RVW MEDS BY RX/DR IN RCRD: CPT | Mod: S$GLB,,, | Performed by: PHYSICIAN ASSISTANT

## 2022-04-07 PROCEDURE — 3008F PR BODY MASS INDEX (BMI) DOCUMENTED: ICD-10-PCS | Mod: S$GLB,,, | Performed by: PHYSICIAN ASSISTANT

## 2022-04-07 PROCEDURE — 1160F PR REVIEW ALL MEDS BY PRESCRIBER/CLIN PHARMACIST DOCUMENTED: ICD-10-PCS | Mod: S$GLB,,, | Performed by: PHYSICIAN ASSISTANT

## 2022-04-07 RX ORDER — LANCETS 33 GAUGE
EACH MISCELLANEOUS
COMMUNITY
Start: 2022-03-22

## 2022-04-07 RX ORDER — CALCIUM CITRATE/VITAMIN D3 200MG-6.25
TABLET ORAL
COMMUNITY
Start: 2022-03-22

## 2022-04-07 RX ORDER — METFORMIN HYDROCHLORIDE 1000 MG/1
1000 TABLET ORAL ONCE
COMMUNITY

## 2022-04-07 RX ORDER — NAPROXEN SODIUM 220 MG/1
81 TABLET, FILM COATED ORAL DAILY
Status: ON HOLD | COMMUNITY
End: 2022-04-27 | Stop reason: SDUPTHER

## 2022-04-07 NOTE — PROGRESS NOTES
[facility]  [address]  [place] [address]  Phone: [phone]   [phone]    Patient's PCP: Jeromy Ray MD  Referring Provider: No ref. provider found    Subjective:      Chief Complaint:   Chief Complaint   Patient presents with    Right Knee - Pain     RT knee inj f/u from 2/23/22, states the injection only helped for about 4 days. Wearing knee sleeve, does help some. Knee feels unstable, feels like it could give out. Knee pops. Pain w/ extended walking, knee swells    Left Knee - Pain     Beginning symptoms of left knee pain from overcompensating due to RT knee pain       Past Medical History:   Diagnosis Date    Arthritis     Diabetes mellitus     Edema, lower extremity     Gout     Hyperlipidemia     Stasis ulcer of lower extremity, left        Past Surgical History:   Procedure Laterality Date    KNEE ARTHROSCOPY W/ MENISCAL REPAIR      left knee meniscus repair      left shoulder rotator cuff repair      SHOULDER ARTHROSCOPY      TONSILLECTOMY         Current Outpatient Medications   Medication Sig    atorvastatin (LIPITOR) 20 MG tablet 20 mg.    EUCRISA 2 % Oint Apply 8 g topically once daily.    furosemide (LASIX) 20 MG tablet     hydrOXYzine HCl (ATARAX) 25 MG tablet     metFORMIN (GLUCOPHAGE) 1000 MG tablet Take 1,000 mg by mouth once.    simvastatin (ZOCOR) 20 MG tablet     traMADoL (ULTRAM) 50 mg tablet Take 50 mg by mouth every 6 (six) hours.    TRUE METRIX GLUCOSE TEST STRIP Strp     TRUEPLUS LANCETS 33 gauge Misc     pregabalin (LYRICA) 75 MG capsule Take 1 capsule (75 mg total) by mouth 3 (three) times daily.     No current facility-administered medications for this visit.       Review of patient's allergies indicates:   Allergen Reactions    Pcn [penicillins]        Family History   Problem Relation Age of Onset    No Known Problems Mother     Diabetes Mellitus Father     No Known Problems Sister     No Known Problems Brother      No Known Problems Daughter     No Known Problems Son     No Known Problems Maternal Aunt     No Known Problems Maternal Uncle     No Known Problems Paternal Aunt     No Known Problems Paternal Uncle     No Known Problems Maternal Grandmother     No Known Problems Maternal Grandfather     No Known Problems Paternal Grandmother     No Known Problems Paternal Grandfather        Social History     Socioeconomic History    Marital status:    Tobacco Use    Smoking status: Former Smoker     Packs/day: 0.50     Years: 40.00     Pack years: 20.00     Types: Cigarettes     Quit date: 12/31/2018     Years since quitting: 3.2    Smokeless tobacco: Never Used   Substance and Sexual Activity    Alcohol use: No    Drug use: No    Sexual activity: Yes       History of present illness:  Earle is here today for follow-up for his right knee osteoarthritis.  He was initially seen approximately 6 weeks ago with a fairly acute and severe flare-up of his arthritis in his knee.  He reports he never had prior issues with the knee.  He presented to the clinic that day with an inability to weightbear and was ambulating via wheelchair.  He had limited range of motion of the right knee and was markedly painful.  I gave him an intra-articular corticosteroid injection at that visit with good relief of his symptoms but it was short-lived for approximately 4-5 days.  He comes in today for further treatment options.  He is ambulating on his own today without any assistive devices.  He is wearing a knee sleeve to his right knee.    Review of Systems:    Constitutional: Negative for chills, fever and weight loss.   HENT: Negative for congestion.    Eyes: Negative for discharge and redness.   Respiratory: Negative for cough and shortness of breath.    Cardiovascular: Negative for chest pain.   Gastrointestinal: Negative for nausea and vomiting.   Musculoskeletal: See HPI.   Skin: Negative for rash.   Neurological: Negative for  headaches.   Endo/Heme/Allergies: Does not bruise/bleed easily.   Psychiatric/Behavioral: The patient is not nervous/anxious.    All other systems reviewed and are negative.       Objective:      Physical Examination:    Vital Signs:  There were no vitals filed for this visit.    Body mass index is 44.3 kg/m².    This a well-developed, well nourished patient in no acute distress.  They are alert and oriented and cooperative to examination.     Right knee exam: Skin to his right knee is clean dry and intact.  There is no erythema or ecchymosis.  There are no signs or symptoms of infection.  Patient is neurovascularly intact throughout the right lower extremity.  Right calf is soft and nontender.  Patient does have an effusion of his right knee.  Patient's extensor mechanism is intact to his right knee.   right knee range of motion is approximately 0-100 degrees.  His right knee is stable to varus and valgus stresses while held in extension.  He has diffuse crepitus of the right knee with any range of motion.  His right knee is diffusely tender to palpation, primarily along the medial aspect.    Pertinent New Results:        XRAY Report / Interpretation:   No new images were taken in the clinic today.      Assessment:       1. Primary osteoarthritis of right knee      Plan:     Primary osteoarthritis of right knee  -     CT Knee w/o Contrast Right w/Boubacar Protocol; Future; Expected date: 04/07/2022        Follow up for Tues/Thurs, To discuss Scheduled surgery w/Dr. Ortiz.    Further discussion was had with the patient and his spouse today about the severity of the osteoarthritis in his knee.  Definitive treatment recommendation would be a total knee arthroplasty.  Patient would like to proceed with this.  Risks and benefits of the procedure were explained to him and his spouse today in detail.  Specifically we discussed the risk of infection and deep vein thrombosis.  We discussed the use of the Boubacar robot system to  assist in surgery.  All of their questions were answered today.  They understood and wished to proceed.  Surgical consents were obtained today.  Patient has not been seen or evaluated by Dr. Ortiz.  Therefore, I will have him follow-up prior to the scheduled surgery date to be seen and evaluated with Dr. Ortiz.  He will need cardiac clearance prior to his total knee arthroplasty.    Risks of the procedure were reviewed with the patient.  This includes, but is not limited to:  infection, bleeding, pain, swelling, decreased range of motion, nerve injury/damage, numbness, leg length discrepancy, wound dehiscence, hardware failure, deep vein thrombosis, pulmonary embolism, reflex sympathetic dystrophy, and possible need for further surgery.        Adama Howard, JOSÉ MIGUELS, PA-C    This note was created using [employer] voice recognition software that occasionally misinterprets words or phrases.

## 2022-04-08 DIAGNOSIS — M17.11 PRIMARY OSTEOARTHRITIS OF RIGHT KNEE: Primary | ICD-10-CM

## 2022-04-08 RX ORDER — MUPIROCIN 20 MG/G
OINTMENT TOPICAL
Status: CANCELLED | OUTPATIENT
Start: 2022-04-08

## 2022-04-11 ENCOUNTER — HOSPITAL ENCOUNTER (OUTPATIENT)
Dept: PREADMISSION TESTING | Facility: HOSPITAL | Age: 62
Discharge: HOME | End: 2022-04-11
Attending: ORTHOPAEDIC SURGERY
Payer: COMMERCIAL

## 2022-04-11 ENCOUNTER — PATIENT MESSAGE (OUTPATIENT)
Dept: ORTHOPEDICS | Facility: CLINIC | Age: 62
End: 2022-04-11
Payer: MEDICARE

## 2022-04-11 ENCOUNTER — HOSPITAL ENCOUNTER (OUTPATIENT)
Dept: RADIOLOGY | Facility: HOSPITAL | Age: 62
Discharge: HOME | End: 2022-04-11
Attending: ORTHOPAEDIC SURGERY
Payer: COMMERCIAL

## 2022-04-11 ENCOUNTER — HOSPITAL ENCOUNTER (OUTPATIENT)
Dept: RADIOLOGY | Facility: HOSPITAL | Age: 62
Discharge: HOME | End: 2022-04-11
Attending: PHYSICIAN ASSISTANT
Payer: COMMERCIAL

## 2022-04-11 VITALS — HEIGHT: 69 IN | BODY MASS INDEX: 42.21 KG/M2 | WEIGHT: 285 LBS

## 2022-04-11 DIAGNOSIS — M17.11 PRIMARY OSTEOARTHRITIS OF RIGHT KNEE: Primary | ICD-10-CM

## 2022-04-11 DIAGNOSIS — Z01.818 PRE-OP TESTING: Primary | ICD-10-CM

## 2022-04-11 DIAGNOSIS — M17.11 PRIMARY OSTEOARTHRITIS OF RIGHT KNEE: ICD-10-CM

## 2022-04-11 LAB
ABO + RH BLD: NORMAL
BLD GP AB SCN CELLS X3 SERPL QL: NORMAL

## 2022-04-11 PROCEDURE — 86850 RBC ANTIBODY SCREEN: CPT | Performed by: ORTHOPAEDIC SURGERY

## 2022-04-11 PROCEDURE — 73700 CT LOWER EXTREMITY W/O DYE: CPT | Mod: 26,RT,, | Performed by: RADIOLOGY

## 2022-04-11 PROCEDURE — 71046 X-RAY EXAM CHEST 2 VIEWS: CPT | Mod: 26,,, | Performed by: RADIOLOGY

## 2022-04-11 PROCEDURE — 93010 ELECTROCARDIOGRAM REPORT: CPT | Mod: ,,, | Performed by: GENERAL PRACTICE

## 2022-04-11 PROCEDURE — 93005 ELECTROCARDIOGRAM TRACING: CPT

## 2022-04-11 PROCEDURE — 73700 CT LOWER EXTREMITY W/O DYE: CPT | Mod: TC,RT

## 2022-04-11 PROCEDURE — 93010 EKG 12-LEAD: ICD-10-PCS | Mod: ,,, | Performed by: GENERAL PRACTICE

## 2022-04-11 PROCEDURE — 36415 COLL VENOUS BLD VENIPUNCTURE: CPT | Performed by: ORTHOPAEDIC SURGERY

## 2022-04-11 PROCEDURE — 73700 CT KNEE WITHOUT CONTRAST RIGHT W/MAKO PROTOCOL: ICD-10-PCS | Mod: 26,RT,, | Performed by: RADIOLOGY

## 2022-04-11 PROCEDURE — 99900104 DSU ONLY-NO CHARGE-EA ADD'L HR (STAT)

## 2022-04-11 PROCEDURE — 71046 X-RAY EXAM CHEST 2 VIEWS: CPT | Mod: TC,FY

## 2022-04-11 PROCEDURE — 99900103 DSU ONLY-NO CHARGE-INITIAL HR (STAT)

## 2022-04-11 PROCEDURE — 71046 XR CHEST PA AND LATERAL: ICD-10-PCS | Mod: 26,,, | Performed by: RADIOLOGY

## 2022-04-11 NOTE — PRE ADMISSION SCREENING
Patient Name: Earle Hoff  YOB: 1960   MRN: [id number]     [facility]   Basic Mobility Inpatient Short Form 6 Clicks         How much difficulty does the patient currently have  Unable  A Lot  A Little  None      1. Turning over in bed (including adjusting bedclothes, sheets and blankets)?     1 []    2 []    3 [x]    4 []        2. Sitting down on and standing up from a chair with arms (e.g., wheelchair, bedside commode, etc.)     1 []  2 []  3 [x]     4 []      3. Moving from lying on back to sitting on the side of the bed?     1 []  2 []  3 []    4 [x]    How much help from another person does the patient currently need  Total  A Lot  A Little  None      4. Moving to and from a bed to a chair (including a wheelchair)?    1 []  2 []  3 []    4 [x]      5. Need to walk in hospital room?    1 []  2 []  3 []    4 [x]      6. Climbing 3-5 steps with a railing?    1 []  2 []  3 []    4 [x]       Raw Score:     22             CMS 0-100% Score:    20.91        %   Standardized Score:   53.28            CMS Modifier:      CJ                                    [facility]   Basic Mobility Inpatient Short Form 6 Clicks Score Conversion Table*         *Use this form to convert -PAC Basic Mobility Inpatient Raw Scores.   -[facility] Inpatient Basic Mobility Short Form Scoring Example   1. Add the number values associated with the response to each item. For example, items totals yield a Raw Score of 21.   2. Match the raw score to the t-Scale scores (t-Scale score = 50.25, SE = 4.69).   3. Find the associated CMS % (CMS % = 28.97%).   4. Locate the correct CMS Functional Modifier Code, or G Code (G code = CJ)     NOTE: Each -PAC Short Form has a separate conversion table. Make sure that you use the correct conversion table.       Instruction Manual - page 45 contains conversion table

## 2022-04-11 NOTE — DISCHARGE INSTRUCTIONS
To confirm, Your doctor has instructed you that surgery is scheduled for: 4/25/22  Franchesca [phone]    Please report to [facility] the morning of surgery. You must check-in and receive a wristband before going to your procedure.    Pre-Op will call the afternoon prior to surgery between 1:00 and 6:00 PM with the final arrival time.  Phone number: [phone]    PLEASE NOTE:  The surgery schedule has many variables which may affect the time of your surgery case.  Family members should be available if your surgery time changes.  Plan to be here the day of your procedure between 4-6 hours.    MEDICATIONS:  TAKE ONLY THESE MEDICATIONS WITH A SMALL SIP OF WATER THE MORNING OF YOUR PROCEDURE:    See List    DO NOT TAKE THESE MEDICATIONS 5-7 DAYS PRIOR to your procedure or per your surgeon's request:   ASPIRIN, ALEVE, ADVIL, IBUPROFEN, FISH OIL VITAMIN E, HERBALS  (May take Tylenol)    ONLY if you are prescribed any types of blood thinners such as:  Aspirin, Coumadin, Plavix, Pradaxa, Xarelto, Aggrenox, Effient, Eliquis, Savasya, Brilinta, or any other, ask your surgeon whether you should stop taking them and how long before surgery you should stop.  You may also need to verify with the prescribing physician if it is ok to stop your medication.      INSTRUCTIONS IMPORTANT!!  Do not eat or drink anything between midnight and the time of your procedure- this includes gum, mints, and candy.  Do not smoke or drink alcoholic beverages 24 hours prior to your procedure.  Shower the night before AND the morning of your procedure with a Chlorhexidine wash such as Hibiclens or Dial antibacterial soap from the neck down.  Do not get it on your face or in your eyes.  You may use your own shampoo and face wash. This helps your skin to be as bacteria free as possible.    If you wear contact lenses, dentures, hearing aids or glasses, bring a container to put them in during surgery and give to a  family member for safe keeping.  Please leave all jewelry, piercing's and valuables at home.   DO NOT remove hair from the surgery site.  Do not shave the incision site unless you are given specific instructions to do so.    ONLY if you have been diagnosed with sleep apnea please bring your C-PAP machine.  ONLY if you wear home oxygen please bring your portable oxygen tank the day of your procedure.  ONLY if you have a history of OPEN HEART SURGERY you will need a clearance from your Cardiologist per Anesthesia.      ONLY for patients requiring bowel prep, written instructions will be given by your doctor's office.  ONLY if you have a neuro stimulator, please bring the controller with you the morning of surgery  ONLY if a type and screen test is needed before surgery, please return:  If your doctor has scheduled you for an overnight stay, bring a small overnight bag with any personal items you need.  Make arrangements in advance for transportation home by a responsible adult.  It is not safe to drive a vehicle during the 24 hours after anesthesia.       [facility] will resume routine visitation for COVID-19 negative patients, including inpatients, outpatients, and  procedural areas. Visitors are still required to practice social distancing in waiting rooms, cafeterias, and common  areas. Visitors and patients should maintain six feet distance between those not in their group. Visitors will be  asked to leave if they exhibit symptoms of respiratory infection, have tested positive for COVID -19 in the last  ten days, have a pending COVID-19 test for symptoms, are unable or refuse to wear a mask OR do not comply  with current policy.       All [facility] facilities and properties are tobacco free.  Smoking is NOT allowed.   If you have any questions about these instructions, call Pre-Op Admit  Nursing at [phone] or the Pre-Op Day Surgery Unit at [phone].

## 2022-04-11 NOTE — PRE ADMISSION SCREENING
"               [facility] Risk Assessment Scale    Patient Name: Earle Hoff  YOB: 1960  MRN: [id number]            RIsk Factor Measure Recommendation Patient Data Scale/Score   BMI >40 Reconsider surgery, weight loss   Estimated body mass index is 42.09 kg/m² as calculated from the following:    Height as of this encounter: 5' 9" (1.753 m).    Weight as of this encounter: 129.3 kg (285 lb).   [] 0 = 1 - 24.9  [] 1 = 25-29.9  [] 2 = 30-34.9  [] 3 = 35-39.9  [x] 4 = 40-44.9  [] 5 = 45-99.9   Hemoglobin AIC (if applicable) >9 Delay surgery until DM under control  Refer for:  · Nutrition Therapy  · Exercise   · Medication    No results found for: LABA1C, HGBA1C    Lab Results   Component Value Date     (H) 04/11/2022      [] 0 = 4.0-5.6  [] 1 = 5.7-6.4  [] 2 = 6.5-6.9  [] 3 = 7.0-7.9  [] 4 = 8.0-8.9  [] 5 = 9.0-12   Hemoglobin (Anemia) <9 Delay surgery   Correct anemia Lab Results   Component Value Date    HGB 12.7 (L) 04/11/2022    [] 20 - <9.0                    Albumin <3 Delay surgery &Workup Lab Results   Component Value Date    ALBUMIN 3.5 04/11/2022    [] 20 - <3.0   Smoking Cessation >4 Weeks Delay Surgery  Refer to OP Cessation Class     [x] 20 - current smoker                                _0.5____ PPD                    Hx of MI, PE, Arrhythmia, CVA, DVT <30 Days Delay Surgery    N/A [] 20      Infection Variable Delay surgery and re-evaluate   N/A [] 20 - recent/current infection     Depression (PHQ) >10 out of 27 Delay Surgery and re-evaluate   Medication   Counseling              [x] 0     []1     []2     []3      []4      [] 5                    (1-4)      (5-9)  (10-14)  (15-19)   (20-27)     Memory Impairment & Memory loss (Mini-Cog Screening Tool) Advanced dementia and/or Parkinson's Reconsider surgery     [x] 0     []1     []2     []3     []4     [] 5     Physical Conditioning (Modified AM-PAC Per Physical Therapy at [facility]) Unable to ambulate on day of surgery Delay surgery " and re-evaluate  Pre-Rehabilitation   (PT evaluation)       []  0   [x]4       []8     []12        []16     []20       (<20%)   (<40%)   (<60%)   (<80% )    (>80%)     Home Environment/Caregiver support  (Per /Navigator Interview)    Availability of basic services and/or approprate assistance during post-operative period Delay surgery and re-evaluate   Safe home environment   Health   1 week post-surgery   Transportation  availability   Ability to obtain DME/Medications post-op    [x] 0     []1     []2     []3     []4     [] 5  [x] 0     []1     []2     []3     []4     [] 5  [x] 0     []1     []2     []3     []4     [] 5  [x] 0     []1     []2     []3     []4     [] 5         MD Contact: Dr. Ortiz Comments:  Total Score:  28

## 2022-04-11 NOTE — PRE ADMISSION SCREENING
JOINT CAMP ASSESSMENT    Name Earle Hoff   MRN [id number]    Age/Sex 62 y.o. male    Surgeon Dr. Avilez Finger   Joint Camp Date 4/11/2022   Surgery Date 4/25/2022   Procedure Right Knee Arthroplasty   Insurance Payor: HUMANA / Plan: HUMANA  PPO / Product Type: PPO /    Care Team Patient Care Team:  Jeromy Ray MD as PCP - General ([facility])    Pharmacy   [facility] [address] [facility], [place] - [address]  [address]  [place] [address]  Phone: [phone] Fax: [phone]     AM-PAC Score   21   Risk Assessment Score 28     Past Medical History:   Diagnosis Date    Arthritis     Diabetes mellitus     Edema, lower extremity     Gout     Hyperlipidemia     Stasis ulcer of lower extremity, left        Past Surgical History:   Procedure Laterality Date    KNEE ARTHROSCOPY W/ MENISCAL REPAIR      left knee meniscus repair      left shoulder rotator cuff repair      SHOULDER ARTHROSCOPY      TONSILLECTOMY           Home Enviroment     Living Arrangement: Lives with spouse  Home Environment: 1-story house/ trailer, number of outside stairs: 1, tub-shower  Home Safety Concerns: Pets in the home: dogs (1).    DISCHARGE CAREGIVER/SUPPORT SYSTEM     Identified post-op caregiver: Patient has spouse / significant other.  Patient's caregiver(s) will be able to provide physical assistance. Patient will have someone to assist overnight.      Caregiver present at pre-op interview:  Yes      PRE-OPERATIVE FUNCTIONAL STATUS     Employment: Employed full time    Pre-op Functional Status: Patient is independent with mobility/ambulation, transfers, ADL's, IADL's.    Use of assistive device for ambulation: splint/brace  ADL: self care  ADL Limitations: difficulty with walking  Medical Restrictions: Unstable ambulation and Decreased range of motions in extremities    POTENTIAL BARRIERS TO DISCHARGE/POTENTIAL POST-OP COMPLICATIONS     Patient is a current some day smoker which could delay wound healing.  Patient with hx of diabetes mellitus.    DISCHARGE PLAN     Expected LOS of 2 days or less for joint replacement discussed with patient. We also discussed a discharge path of HH for approximately two weeks with a transition to outpatient PT on the third week given no post-op complications.      Patient in agreement with discharge plan: Yes    Discharge to: Discharge home with home health (PT/OT) x2 weeks with transition to outpatient PT     HH:  [facility]  Patient disclosure form completed and sent to case management for upload to the medical record.       OP PT: [facility]     Home DME: none    Needed DME at D/C: rolling walker, bedside commode and tub transfer bench. Patient to purchase tub transfer bench from retail prior to surgery.     Rx: Per Dr. Ortiz at discharge     Meds to Beds: N/A  Patient expected to discharge on Aspirin 81mg by mouth twice daily for DVT prophylaxis.

## 2022-04-18 ENCOUNTER — OFFICE VISIT (OUTPATIENT)
Dept: ORTHOPEDICS | Facility: CLINIC | Age: 62
End: 2022-04-18
Payer: COMMERCIAL

## 2022-04-18 VITALS — HEIGHT: 69 IN | WEIGHT: 285 LBS | BODY MASS INDEX: 42.21 KG/M2

## 2022-04-18 DIAGNOSIS — M47.816 LUMBAR SPONDYLOSIS: Primary | ICD-10-CM

## 2022-04-18 DIAGNOSIS — M47.814 THORACIC SPONDYLOSIS: ICD-10-CM

## 2022-04-18 PROCEDURE — 3008F PR BODY MASS INDEX (BMI) DOCUMENTED: ICD-10-PCS | Mod: S$GLB,,, | Performed by: ORTHOPAEDIC SURGERY

## 2022-04-18 PROCEDURE — 3008F BODY MASS INDEX DOCD: CPT | Mod: S$GLB,,, | Performed by: ORTHOPAEDIC SURGERY

## 2022-04-18 PROCEDURE — 1160F RVW MEDS BY RX/DR IN RCRD: CPT | Mod: S$GLB,,, | Performed by: ORTHOPAEDIC SURGERY

## 2022-04-18 PROCEDURE — 1160F PR REVIEW ALL MEDS BY PRESCRIBER/CLIN PHARMACIST DOCUMENTED: ICD-10-PCS | Mod: S$GLB,,, | Performed by: ORTHOPAEDIC SURGERY

## 2022-04-18 PROCEDURE — 99213 OFFICE O/P EST LOW 20 MIN: CPT | Mod: S$GLB,,, | Performed by: ORTHOPAEDIC SURGERY

## 2022-04-18 PROCEDURE — 99213 PR OFFICE/OUTPT VISIT, EST, LEVL III, 20-29 MIN: ICD-10-PCS | Mod: S$GLB,,, | Performed by: ORTHOPAEDIC SURGERY

## 2022-04-18 RX ORDER — FOLIC ACID 1 MG/1
TABLET ORAL
COMMUNITY
Start: 2022-04-12

## 2022-04-18 NOTE — PROGRESS NOTES
Subjective:       Patient ID: Earle Hoff is a 62 y.o. male.    Chief Complaint: Pain of the Lumbar Spine (Patient finished his physical therapy, she states that he got relief, pain comes and goes.) and Pain of the Thoracic Spine (Patient finished his physical therapy, she states that he got relief, he still gets knots.)      History of Present Illness    Prior to meeting with the patient I reviewed the medical chart in [facility]. This included reviewing the previous progress notes from our office, review of the patient's last appointment with their primary care provider, review of any visits to the emergency room, and review of any pain management appointments or procedures.   Patient was last seen in the office by our clinic on March 7th.  He was having some chronic neck and back pain issues.  Primarily spondylolysis and degenerative changes.  Axial type back pain.  He was prescribed physical therapy for the T and L-spine.  He states he has been doing well over the last 2 months.  His pain has significantly improved.  He continues to have intermittent sharp shooting pains in the midback which do occasionally wake him up from sleep however the frequency and intensity of this discomfort has diminished with the therapy.  He was also noted to have some numbness and tingling symptoms in the left upper extremity which have now resolved.    He is scheduled to have a total knee arthroplasty on April 25th with Dr. Ortiz.  He would like to continue the therapy for the strengthening while he focuses on his recovery from his knee replacement.    Current Medications  Current Outpatient Medications   Medication Sig Dispense Refill    atorvastatin (LIPITOR) 20 MG tablet 20 mg.      EUCRISA 2 % Oint Apply 8 g topically once daily. 60 g 4    folic acid (FOLVITE) 1 MG tablet       furosemide (LASIX) 20 MG tablet       hydrOXYzine HCl (ATARAX) 25 MG tablet       metFORMIN (GLUCOPHAGE) 1000 MG tablet Take 1,000 mg by mouth  once.      pregabalin (LYRICA) 75 MG capsule Take 1 capsule (75 mg total) by mouth 3 (three) times daily. 90 capsule 2    traMADoL (ULTRAM) 50 mg tablet Take 50 mg by mouth every 6 (six) hours.      TRUE METRIX GLUCOSE TEST STRIP Strp       TRUEPLUS LANCETS 33 gauge Misc       aspirin 81 MG Chew Take 81 mg by mouth once daily.       No current facility-administered medications for this visit.       Allergies  Review of patient's allergies indicates:   Allergen Reactions    Pcn [penicillins]        Past Medical History  Past Medical History:   Diagnosis Date    Arthritis     Diabetes mellitus     Edema, lower extremity     Gout     Hyperlipidemia     Stasis ulcer of lower extremity, left        Surgical History  Past Surgical History:   Procedure Laterality Date    KNEE ARTHROSCOPY W/ MENISCAL REPAIR      left knee meniscus repair      left shoulder rotator cuff repair      SHOULDER ARTHROSCOPY      TONSILLECTOMY         Family History:   Family History   Problem Relation Age of Onset    No Known Problems Mother     Diabetes Mellitus Father     No Known Problems Sister     No Known Problems Brother     No Known Problems Daughter     No Known Problems Son     No Known Problems Maternal Aunt     No Known Problems Maternal Uncle     No Known Problems Paternal Aunt     No Known Problems Paternal Uncle     No Known Problems Maternal Grandmother     No Known Problems Maternal Grandfather     No Known Problems Paternal Grandmother     No Known Problems Paternal Grandfather        Social History:   Social History     Socioeconomic History    Marital status:    Tobacco Use    Smoking status: Current Some Day Smoker     Packs/day: 0.50     Years: 40.00     Pack years: 20.00     Types: Cigarettes     Last attempt to quit: 12/31/2018     Years since quitting: 3.2    Smokeless tobacco: Never Used   Substance and Sexual Activity    Alcohol use: No    Drug use: No    Sexual activity: Yes        Hospitalization/Major Diagnostic Procedure:     Review of Systems     General/Constitutional:  Chills denies. Fatigue denies. Fever denies. Weight gain denies. Weight loss denies.    Respiratory:  Shortness of breath denies.    Cardiovascular:  Chest pain denies.    Gastrointestinal:  Constipation denies. Diarrhea denies. Nausea denies. Vomiting denies.     Hematology:  Easy bruising denies. Prolonged bleeding denies.     Genitourinary:  Frequent urination denies. Pain in lower back denies. Painful urination denies.     Musculoskeletal:  See HPI for details    Skin:  Rash denies.    Neurologic:  Dizziness denies. Gait abnormalities denies. Seizures denies. Tingling/Numbess denies.    Psychiatric:  Anxiety denies. Depressed mood denies.     Objective:   Vital Signs: There were no vitals filed for this visit.     Physical Exam      General Examination:     Constitutional: The patient is alert and oriented to lace person and time. Mood is pleasant.     Head/Face: Normal facial features normal eyebrows    Eyes: Normal extraocular motion bilaterally    Lungs: Respirations are equal and unlabored    Gait is coordinated.    Cardiovascular: There are no swelling or varicosities present.    Lymphatic: Negative for adenopathy    Skin: Normal    Neurological: Level of consciousness normal. Oriented to place person and time and situation    Psychiatric: Oriented to time place person and situation    Examination of the spine, there is some tenderness to palpation over the upper thoracic and thoracolumbar junctions.  This is primarily over the spinous process and the paraspinous musculature.  No significant complaints of discomfort or pain with range of motion to include flexion extension lateral bending and rotation.  Straight leg raises are negative bilaterally.    XRAY Report/ Interpretation:      Assessment:       1. Lumbar spondylosis    2. Thoracic spondylosis        Plan:       Earle was seen today for pain and  pain.    Diagnoses and all orders for this visit:    Lumbar spondylosis    Thoracic spondylosis         No follow-ups on file.  This is to attest that the physician's assistant served in the capacity as a scribe for this patient encounter.  This is also to verify that I have reviewed the patient's history and helped formulate the treatment plan and discussed it with the physician's assistant .  I have actively participated and evaluation and treatment plan for this patient visit.  The treatment plan and medical decision-making is outlined below:  Patient has had good response to physical therapy for thoracolumbar pain.  He is scheduled for knee replacement in the coming days.  We will continue physical therapy for stretching and strengthening while he focuses on his recovery from his knee replacement.  We will send a new prescription for therapy to treat his Back.  If his symptoms return, or do not improve as previously discussed would consider updating MRIs of the CT and L-spine.     Treatment options were discussed with regards to the nature of the medical condition. Conservative pain intervention and surgical options were discussed in detail. The probability of success of each separate treatment option was discussed. The patient expressed a clear understanding of the treatment options. With regards to surgery, the procedure risk, benefits, complications, and outcomes were discussed. No guarantees were given with regards to surgical outcome.   The risk of complications, morbidity, and mortality of patient management decisions have been made at the time of this visit. These are associated with the patient's problems, diagnostic procedures and treatment options. This includes the possible management options selected and those considered but not selected by the patient after shared medical decision making we discussed with the patient.     This note was created using Dragon voice recognition software that  occasionally misinterpreted phrases or words.

## 2022-04-19 ENCOUNTER — OFFICE VISIT (OUTPATIENT)
Dept: ORTHOPEDICS | Facility: CLINIC | Age: 62
End: 2022-04-19
Payer: COMMERCIAL

## 2022-04-19 VITALS — BODY MASS INDEX: 42.21 KG/M2 | HEIGHT: 69 IN | WEIGHT: 285 LBS

## 2022-04-19 DIAGNOSIS — M17.11 PRIMARY OSTEOARTHRITIS OF RIGHT KNEE: Primary | ICD-10-CM

## 2022-04-19 PROCEDURE — 99212 PR OFFICE/OUTPT VISIT, EST, LEVL II, 10-19 MIN: ICD-10-PCS | Mod: S$GLB,,, | Performed by: ORTHOPAEDIC SURGERY

## 2022-04-19 PROCEDURE — 3008F PR BODY MASS INDEX (BMI) DOCUMENTED: ICD-10-PCS | Mod: S$GLB,,, | Performed by: ORTHOPAEDIC SURGERY

## 2022-04-19 PROCEDURE — 1160F RVW MEDS BY RX/DR IN RCRD: CPT | Mod: S$GLB,,, | Performed by: ORTHOPAEDIC SURGERY

## 2022-04-19 PROCEDURE — 3008F BODY MASS INDEX DOCD: CPT | Mod: S$GLB,,, | Performed by: ORTHOPAEDIC SURGERY

## 2022-04-19 PROCEDURE — 1160F PR REVIEW ALL MEDS BY PRESCRIBER/CLIN PHARMACIST DOCUMENTED: ICD-10-PCS | Mod: S$GLB,,, | Performed by: ORTHOPAEDIC SURGERY

## 2022-04-19 PROCEDURE — 99212 OFFICE O/P EST SF 10 MIN: CPT | Mod: S$GLB,,, | Performed by: ORTHOPAEDIC SURGERY

## 2022-04-19 NOTE — H&P (VIEW-ONLY)
[facility]    Subjective:     Chief Complaint:   Chief Complaint   Patient presents with    Right Knee - Pain     Pt is here to discuss Right knee surgery       Past Medical History:   Diagnosis Date    Arthritis     Diabetes mellitus     Edema, lower extremity     Gout     Hyperlipidemia     Stasis ulcer of lower extremity, left        Past Surgical History:   Procedure Laterality Date    KNEE ARTHROSCOPY W/ MENISCAL REPAIR      left knee meniscus repair      left shoulder rotator cuff repair      SHOULDER ARTHROSCOPY      TONSILLECTOMY         Current Outpatient Medications   Medication Sig    aspirin 81 MG Chew Take 81 mg by mouth once daily.    atorvastatin (LIPITOR) 20 MG tablet 20 mg.    EUCRISA 2 % Oint Apply 8 g topically once daily.    folic acid (FOLVITE) 1 MG tablet     furosemide (LASIX) 20 MG tablet     hydrOXYzine HCl (ATARAX) 25 MG tablet     metFORMIN (GLUCOPHAGE) 1000 MG tablet Take 1,000 mg by mouth once.    traMADoL (ULTRAM) 50 mg tablet Take 50 mg by mouth every 6 (six) hours.    TRUE METRIX GLUCOSE TEST STRIP Strp     TRUEPLUS LANCETS 33 gauge Misc     pregabalin (LYRICA) 75 MG capsule Take 1 capsule (75 mg total) by mouth 3 (three) times daily.     No current facility-administered medications for this visit.       Review of patient's allergies indicates:   Allergen Reactions    Pcn [penicillins]        Family History   Problem Relation Age of Onset    No Known Problems Mother     Diabetes Mellitus Father     No Known Problems Sister     No Known Problems Brother     No Known Problems Daughter     No Known Problems Son     No Known Problems Maternal Aunt     No Known Problems Maternal Uncle     No Known Problems Paternal Aunt     No Known Problems Paternal Uncle     No Known Problems Maternal Grandmother     No Known Problems Maternal Grandfather     No Known Problems Paternal Grandmother     No Known Problems Paternal Grandfather        Social  History     Socioeconomic History    Marital status:    Tobacco Use    Smoking status: Current Some Day Smoker     Packs/day: 0.50     Years: 40.00     Pack years: 20.00     Types: Cigarettes     Last attempt to quit: 12/31/2018     Years since quitting: 3.3    Smokeless tobacco: Never Used   Substance and Sexual Activity    Alcohol use: No    Drug use: No    Sexual activity: Yes       History of present illness:  Mr. Og comes in today for follow-up for his right knee osteoarthritis.  He is scheduled for his total knee arthroplasty next week with Dr. Ortiz.  However, he has not been able to establish care with him directly with a face-to-face visit.  I saw him approximately 2 weeks ago and set him up for his total knee arthroplasty with Dr. Ortiz he comes in today to have a face-to-face visit with him and review his images.    Review of Systems:    Constitution: Negative for chills, fever, and sweats.  Negative for unexplained weight loss.    HENT:  Negative for headaches and blurry vision.    Cardiovascular:Negative for chest pain or irregular heart beat. Negative for hypertension.    Respiratory:  Negative for cough and shortness of breath.    Gastrointestinal: Negative for abdominal pain, heartburn, melena, nausea, and vomitting.    Genitourinary:  Negative bladder incontinence and dysuria.    Musculoskeletal:  See HPI for details.     Neurological: Negative for numbness.    Psychiatric/Behavioral: Negative for depression.  The patient is not nervous/anxious.      Endocrine: Negative for polyuria    Hematologic/Lymphatic: Negative for bleeding problem.  Does not bruise/bleed easily.    Skin: Negative for poor would healing and rash    Objective:      Physical Examination:    Vital Signs:  There were no vitals filed for this visit.    Body mass index is 42.09 kg/m².    This a well-developed, well nourished patient in no acute distress.  They are alert and oriented and cooperative to examination.  "       Right knee exam:  His right knee exam is unchanged from where he was at his last visit approximately week and half ago. Skin to his right knee is clean dry and intact.  There is no erythema or ecchymosis.  There are no signs or symptoms of infection.  Patient is neurovascularly intact throughout the right lower extremity.  Right calf is soft and nontender.  Patient does have an effusion of his right knee.  Patient's extensor mechanism is intact to his right knee.   right knee range of motion is approximately 0-100 degrees.  His right knee is stable to varus and valgus stresses while held in extension.  He has diffuse crepitus of the right knee with any range of motion.  His right knee is diffusely tender to palpation, primarily along the medial aspect.      Pertinent New Results:    XRAY Report / Interpretation:   No new radiographs were taken on today's visit.    Assessment/Plan:      1.  Right knee osteoarthritis, severe.    Once again reviewed the risks and benefits of a total knee arthroplasty.  We specifically discussed the risk of infection and the use of the [employer] robot system.  All the patient's questions were answered today.  He understood and wished to proceed.  He would like to try to do this as an outpatient procedure.    Adama Howard, Physician Assistant, served in the capacity as a "scribe" for this patient encounter.  A "face-to-face" encounter occurred with Dr. Raghav Ortiz on this date.  The treatment plan and medical decision-making is outlined above. Patient was seen and examined with a chaperone.       This note was created using Dragon voice recognition software that occasionally misinterpreted phrases or words.          "

## 2022-04-20 ENCOUNTER — TELEPHONE (OUTPATIENT)
Dept: ORTHOPEDICS | Facility: CLINIC | Age: 62
End: 2022-04-20

## 2022-04-20 DIAGNOSIS — Z96.651 STATUS POST TOTAL KNEE REPLACEMENT, RIGHT: Primary | ICD-10-CM

## 2022-04-21 ENCOUNTER — HOSPITAL ENCOUNTER (OUTPATIENT)
Dept: PREADMISSION TESTING | Facility: HOSPITAL | Age: 62
Discharge: HOME | End: 2022-04-21
Attending: ORTHOPAEDIC SURGERY
Payer: MEDICARE

## 2022-04-21 DIAGNOSIS — M17.11 OSTEOARTHRITIS OF RIGHT KNEE, UNSPECIFIED OSTEOARTHRITIS TYPE: Primary | ICD-10-CM

## 2022-04-21 PROCEDURE — 99900103 DSU ONLY-NO CHARGE-INITIAL HR (STAT)

## 2022-04-21 PROCEDURE — 87081 CULTURE SCREEN ONLY: CPT | Performed by: ORTHOPAEDIC SURGERY

## 2022-04-22 ENCOUNTER — ANESTHESIA EVENT (OUTPATIENT)
Dept: SURGERY | Facility: HOSPITAL | Age: 62
End: 2022-04-22
Payer: MEDICARE

## 2022-04-24 LAB — MRSA SPEC QL CULT: NORMAL

## 2022-04-25 ENCOUNTER — ANESTHESIA (OUTPATIENT)
Dept: SURGERY | Facility: HOSPITAL | Age: 62
End: 2022-04-25
Payer: MEDICARE

## 2022-04-25 ENCOUNTER — HOSPITAL ENCOUNTER (OUTPATIENT)
Facility: HOSPITAL | Age: 62
Discharge: HOME HEALTH | End: 2022-04-27
Attending: ORTHOPAEDIC SURGERY | Admitting: INTERNAL MEDICINE
Payer: MEDICARE

## 2022-04-25 DIAGNOSIS — M17.11 PRIMARY OSTEOARTHRITIS OF RIGHT KNEE: Primary | ICD-10-CM

## 2022-04-25 LAB
POCT GLUCOSE: 227 MG/DL (ref 70–110)
POCT GLUCOSE: 265 MG/DL (ref 70–110)

## 2022-04-25 PROCEDURE — 63600175 PHARM REV CODE 636 W HCPCS: Performed by: ORTHOPAEDIC SURGERY

## 2022-04-25 PROCEDURE — D9220A PRA ANESTHESIA: ICD-10-PCS | Mod: CRNA,,, | Performed by: NURSE ANESTHETIST, CERTIFIED REGISTERED

## 2022-04-25 PROCEDURE — 76942 PR U/S GUIDANCE FOR NEEDLE GUIDANCE: ICD-10-PCS | Mod: 26,,, | Performed by: ANESTHESIOLOGY

## 2022-04-25 PROCEDURE — 64447 NJX AA&/STRD FEMORAL NRV IMG: CPT | Performed by: ANESTHESIOLOGY

## 2022-04-25 PROCEDURE — C1713 ANCHOR/SCREW BN/BN,TIS/BN: HCPCS | Performed by: ORTHOPAEDIC SURGERY

## 2022-04-25 PROCEDURE — 63600175 PHARM REV CODE 636 W HCPCS: Performed by: NURSE ANESTHETIST, CERTIFIED REGISTERED

## 2022-04-25 PROCEDURE — 94799 UNLISTED PULMONARY SVC/PX: CPT

## 2022-04-25 PROCEDURE — 99900104 DSU ONLY-NO CHARGE-EA ADD'L HR (STAT): Performed by: ORTHOPAEDIC SURGERY

## 2022-04-25 PROCEDURE — 27200671 HC STIMUCATH NEEDLE/ CATHETER: Performed by: ANESTHESIOLOGY

## 2022-04-25 PROCEDURE — D9220A PRA ANESTHESIA: Mod: CRNA,,, | Performed by: NURSE ANESTHETIST, CERTIFIED REGISTERED

## 2022-04-25 PROCEDURE — 63600175 PHARM REV CODE 636 W HCPCS: Performed by: INTERNAL MEDICINE

## 2022-04-25 PROCEDURE — 25000003 PHARM REV CODE 250: Performed by: ORTHOPAEDIC SURGERY

## 2022-04-25 PROCEDURE — 64447 PR NERVE BLOCK INJ, ANES/STEROID, FEMORAL, INCL IMAG GUIDANCE: ICD-10-PCS | Mod: 59,RT,, | Performed by: ANESTHESIOLOGY

## 2022-04-25 PROCEDURE — 37000008 HC ANESTHESIA 1ST 15 MINUTES: Performed by: ORTHOPAEDIC SURGERY

## 2022-04-25 PROCEDURE — 36000713 HC OR TIME LEV V EA ADD 15 MIN: Performed by: ORTHOPAEDIC SURGERY

## 2022-04-25 PROCEDURE — C1776 JOINT DEVICE (IMPLANTABLE): HCPCS | Performed by: ORTHOPAEDIC SURGERY

## 2022-04-25 PROCEDURE — 63600175 PHARM REV CODE 636 W HCPCS

## 2022-04-25 PROCEDURE — 64447 NJX AA&/STRD FEMORAL NRV IMG: CPT | Mod: 59,RT,, | Performed by: ANESTHESIOLOGY

## 2022-04-25 PROCEDURE — 71000039 HC RECOVERY, EACH ADD'L HOUR: Performed by: ORTHOPAEDIC SURGERY

## 2022-04-25 PROCEDURE — D9220A PRA ANESTHESIA: Mod: ANES,,, | Performed by: ANESTHESIOLOGY

## 2022-04-25 PROCEDURE — 99900103 DSU ONLY-NO CHARGE-INITIAL HR (STAT): Performed by: ORTHOPAEDIC SURGERY

## 2022-04-25 PROCEDURE — 25000003 PHARM REV CODE 250: Performed by: ANESTHESIOLOGY

## 2022-04-25 PROCEDURE — 97110 THERAPEUTIC EXERCISES: CPT | Mod: 97

## 2022-04-25 PROCEDURE — 36000712 HC OR TIME LEV V 1ST 15 MIN: Performed by: ORTHOPAEDIC SURGERY

## 2022-04-25 PROCEDURE — 97161 PT EVAL LOW COMPLEX 20 MIN: CPT

## 2022-04-25 PROCEDURE — 94761 N-INVAS EAR/PLS OXIMETRY MLT: CPT

## 2022-04-25 PROCEDURE — D9220A PRA ANESTHESIA: ICD-10-PCS | Mod: ANES,,, | Performed by: ANESTHESIOLOGY

## 2022-04-25 PROCEDURE — 37000009 HC ANESTHESIA EA ADD 15 MINS: Performed by: ORTHOPAEDIC SURGERY

## 2022-04-25 PROCEDURE — 27200688 HC TRAY, SPINAL-HYPER/ ISOBARIC: Performed by: ANESTHESIOLOGY

## 2022-04-25 PROCEDURE — 25000003 PHARM REV CODE 250: Performed by: NURSE ANESTHETIST, CERTIFIED REGISTERED

## 2022-04-25 PROCEDURE — 27201423 OPTIME MED/SURG SUP & DEVICES STERILE SUPPLY: Performed by: ORTHOPAEDIC SURGERY

## 2022-04-25 PROCEDURE — 63600175 PHARM REV CODE 636 W HCPCS: Performed by: ANESTHESIOLOGY

## 2022-04-25 PROCEDURE — 76942 ECHO GUIDE FOR BIOPSY: CPT | Mod: 26,,, | Performed by: ANESTHESIOLOGY

## 2022-04-25 PROCEDURE — 71000033 HC RECOVERY, INTIAL HOUR: Performed by: ORTHOPAEDIC SURGERY

## 2022-04-25 PROCEDURE — C9290 INJ, BUPIVACAINE LIPOSOME: HCPCS | Performed by: ANESTHESIOLOGY

## 2022-04-25 DEVICE — PRIMARY TIBIAL BASEPLATE
Type: IMPLANTABLE DEVICE | Site: KNEE | Status: FUNCTIONAL
Brand: TRIATHLON

## 2022-04-25 DEVICE — TIBIAL BEARING INSERT - CS
Type: IMPLANTABLE DEVICE | Site: KNEE | Status: FUNCTIONAL
Brand: TRIATHLON

## 2022-04-25 DEVICE — SYMMETRIC PATELLA
Type: IMPLANTABLE DEVICE | Site: KNEE | Status: FUNCTIONAL
Brand: TRIATHLON

## 2022-04-25 DEVICE — CEMENT BONE SIMPLEX HV RADPQ: Type: IMPLANTABLE DEVICE | Site: KNEE | Status: FUNCTIONAL

## 2022-04-25 DEVICE — CRUCIATE RETAINING FEMORAL
Type: IMPLANTABLE DEVICE | Site: KNEE | Status: FUNCTIONAL
Brand: TRIATHLON

## 2022-04-25 RX ORDER — DEXTROSE MONOHYDRATE AND SODIUM CHLORIDE 5; .45 G/100ML; G/100ML
INJECTION, SOLUTION INTRAVENOUS CONTINUOUS
Status: CANCELLED | OUTPATIENT
Start: 2022-04-25

## 2022-04-25 RX ORDER — KETAMINE HYDROCHLORIDE 10 MG/ML
INJECTION, SOLUTION INTRAMUSCULAR; INTRAVENOUS
Status: DISCONTINUED | OUTPATIENT
Start: 2022-04-25 | End: 2022-04-25

## 2022-04-25 RX ORDER — PROPOFOL 10 MG/ML
VIAL (ML) INTRAVENOUS
Status: DISCONTINUED | OUTPATIENT
Start: 2022-04-25 | End: 2022-04-25

## 2022-04-25 RX ORDER — PREGABALIN 75 MG/1
75 CAPSULE ORAL ONCE
Status: COMPLETED | OUTPATIENT
Start: 2022-04-25 | End: 2022-04-25

## 2022-04-25 RX ORDER — FENTANYL CITRATE 50 UG/ML
INJECTION, SOLUTION INTRAMUSCULAR; INTRAVENOUS
Status: DISCONTINUED | OUTPATIENT
Start: 2022-04-25 | End: 2022-04-25

## 2022-04-25 RX ORDER — PROPOFOL 10 MG/ML
VIAL (ML) INTRAVENOUS CONTINUOUS PRN
Status: DISCONTINUED | OUTPATIENT
Start: 2022-04-25 | End: 2022-04-25

## 2022-04-25 RX ORDER — NAPROXEN SODIUM 220 MG/1
81 TABLET, FILM COATED ORAL 2 TIMES DAILY
Status: DISCONTINUED | OUTPATIENT
Start: 2022-04-25 | End: 2022-04-27 | Stop reason: HOSPADM

## 2022-04-25 RX ORDER — IBUPROFEN 200 MG
24 TABLET ORAL
Status: DISCONTINUED | OUTPATIENT
Start: 2022-04-25 | End: 2022-04-27 | Stop reason: HOSPADM

## 2022-04-25 RX ORDER — BISACODYL 10 MG
10 SUPPOSITORY, RECTAL RECTAL DAILY
Status: DISCONTINUED | OUTPATIENT
Start: 2022-04-25 | End: 2022-04-27 | Stop reason: HOSPADM

## 2022-04-25 RX ORDER — SODIUM CHLORIDE 0.9 % (FLUSH) 0.9 %
5 SYRINGE (ML) INJECTION
Status: DISCONTINUED | OUTPATIENT
Start: 2022-04-25 | End: 2022-04-27 | Stop reason: HOSPADM

## 2022-04-25 RX ORDER — POLYETHYLENE GLYCOL 3350 17 G/17G
17 POWDER, FOR SOLUTION ORAL DAILY
Status: DISCONTINUED | OUTPATIENT
Start: 2022-04-25 | End: 2022-04-27 | Stop reason: HOSPADM

## 2022-04-25 RX ORDER — ZOLPIDEM TARTRATE 5 MG/1
5 TABLET ORAL NIGHTLY PRN
Status: DISCONTINUED | OUTPATIENT
Start: 2022-04-25 | End: 2022-04-27 | Stop reason: HOSPADM

## 2022-04-25 RX ORDER — MUPIROCIN 20 MG/G
OINTMENT TOPICAL
Status: DISCONTINUED | OUTPATIENT
Start: 2022-04-25 | End: 2022-04-25 | Stop reason: HOSPADM

## 2022-04-25 RX ORDER — ONDANSETRON 8 MG/1
8 TABLET, ORALLY DISINTEGRATING ORAL EVERY 8 HOURS PRN
Status: DISCONTINUED | OUTPATIENT
Start: 2022-04-25 | End: 2022-04-27 | Stop reason: HOSPADM

## 2022-04-25 RX ORDER — EPHEDRINE SULFATE 50 MG/ML
INJECTION, SOLUTION INTRAVENOUS
Status: DISCONTINUED | OUTPATIENT
Start: 2022-04-25 | End: 2022-04-25

## 2022-04-25 RX ORDER — LIDOCAINE HYDROCHLORIDE 10 MG/ML
1 INJECTION, SOLUTION EPIDURAL; INFILTRATION; INTRACAUDAL; PERINEURAL ONCE
Status: DISCONTINUED | OUTPATIENT
Start: 2022-04-25 | End: 2022-04-25 | Stop reason: HOSPADM

## 2022-04-25 RX ORDER — SODIUM CHLORIDE 0.9 % (FLUSH) 0.9 %
10 SYRINGE (ML) INJECTION
Status: DISCONTINUED | OUTPATIENT
Start: 2022-04-25 | End: 2022-04-27 | Stop reason: HOSPADM

## 2022-04-25 RX ORDER — METOCLOPRAMIDE HYDROCHLORIDE 5 MG/ML
10 INJECTION INTRAMUSCULAR; INTRAVENOUS EVERY 10 MIN PRN
Status: DISCONTINUED | OUTPATIENT
Start: 2022-04-25 | End: 2022-04-25 | Stop reason: HOSPADM

## 2022-04-25 RX ORDER — CEFAZOLIN SODIUM 2 G/50ML
2 SOLUTION INTRAVENOUS
Status: COMPLETED | OUTPATIENT
Start: 2022-04-25 | End: 2022-04-26

## 2022-04-25 RX ORDER — LIDOCAINE HYDROCHLORIDE 20 MG/ML
INJECTION INTRAVENOUS
Status: DISCONTINUED | OUTPATIENT
Start: 2022-04-25 | End: 2022-04-25

## 2022-04-25 RX ORDER — OXYCODONE HCL 10 MG/1
10 TABLET, FILM COATED, EXTENDED RELEASE ORAL ONCE
Status: COMPLETED | OUTPATIENT
Start: 2022-04-25 | End: 2022-04-25

## 2022-04-25 RX ORDER — FAMOTIDINE 20 MG/1
20 TABLET, FILM COATED ORAL 2 TIMES DAILY
Status: DISCONTINUED | OUTPATIENT
Start: 2022-04-25 | End: 2022-04-27 | Stop reason: HOSPADM

## 2022-04-25 RX ORDER — INSULIN ASPART 100 [IU]/ML
0-5 INJECTION, SOLUTION INTRAVENOUS; SUBCUTANEOUS
Status: DISCONTINUED | OUTPATIENT
Start: 2022-04-25 | End: 2022-04-27 | Stop reason: HOSPADM

## 2022-04-25 RX ORDER — MORPHINE SULFATE 2 MG/ML
2 INJECTION, SOLUTION INTRAMUSCULAR; INTRAVENOUS EVERY 4 HOURS PRN
Status: DISCONTINUED | OUTPATIENT
Start: 2022-04-25 | End: 2022-04-27 | Stop reason: HOSPADM

## 2022-04-25 RX ORDER — PHENYLEPHRINE HYDROCHLORIDE 10 MG/ML
INJECTION INTRAVENOUS
Status: DISCONTINUED | OUTPATIENT
Start: 2022-04-25 | End: 2022-04-25

## 2022-04-25 RX ORDER — MIDAZOLAM HYDROCHLORIDE 1 MG/ML
INJECTION, SOLUTION INTRAMUSCULAR; INTRAVENOUS
Status: DISCONTINUED | OUTPATIENT
Start: 2022-04-25 | End: 2022-04-25

## 2022-04-25 RX ORDER — CEFAZOLIN SODIUM 2 G/50ML
2 SOLUTION INTRAVENOUS ONCE
Status: COMPLETED | OUTPATIENT
Start: 2022-04-25 | End: 2022-04-25

## 2022-04-25 RX ORDER — TRANEXAMIC ACID 100 MG/ML
INJECTION, SOLUTION INTRAVENOUS
Status: DISCONTINUED | OUTPATIENT
Start: 2022-04-25 | End: 2022-04-25

## 2022-04-25 RX ORDER — FENTANYL CITRATE 50 UG/ML
25 INJECTION, SOLUTION INTRAMUSCULAR; INTRAVENOUS EVERY 5 MIN PRN
Status: DISCONTINUED | OUTPATIENT
Start: 2022-04-25 | End: 2022-04-25 | Stop reason: HOSPADM

## 2022-04-25 RX ORDER — ACETAMINOPHEN 10 MG/ML
INJECTION, SOLUTION INTRAVENOUS
Status: DISCONTINUED | OUTPATIENT
Start: 2022-04-25 | End: 2022-04-25

## 2022-04-25 RX ORDER — OXYCODONE HYDROCHLORIDE 5 MG/1
5 TABLET ORAL
Status: DISCONTINUED | OUTPATIENT
Start: 2022-04-25 | End: 2022-04-25 | Stop reason: HOSPADM

## 2022-04-25 RX ORDER — BUPIVACAINE HYDROCHLORIDE 7.5 MG/ML
INJECTION, SOLUTION EPIDURAL; RETROBULBAR
Status: DISCONTINUED | OUTPATIENT
Start: 2022-04-25 | End: 2022-04-25

## 2022-04-25 RX ORDER — CELECOXIB 100 MG/1
400 CAPSULE ORAL ONCE
Status: COMPLETED | OUTPATIENT
Start: 2022-04-25 | End: 2022-04-25

## 2022-04-25 RX ORDER — BUPIVACAINE HYDROCHLORIDE 5 MG/ML
INJECTION, SOLUTION EPIDURAL; INTRACAUDAL
Status: DISCONTINUED | OUTPATIENT
Start: 2022-04-25 | End: 2022-04-25

## 2022-04-25 RX ORDER — GLUCAGON 1 MG
1 KIT INJECTION
Status: DISCONTINUED | OUTPATIENT
Start: 2022-04-25 | End: 2022-04-27 | Stop reason: HOSPADM

## 2022-04-25 RX ORDER — ONDANSETRON 2 MG/ML
INJECTION INTRAMUSCULAR; INTRAVENOUS
Status: DISCONTINUED | OUTPATIENT
Start: 2022-04-25 | End: 2022-04-25

## 2022-04-25 RX ORDER — OXYCODONE AND ACETAMINOPHEN 7.5; 325 MG/1; MG/1
1 TABLET ORAL EVERY 4 HOURS PRN
Qty: 28 TABLET | Refills: 0 | Status: SHIPPED | OUTPATIENT
Start: 2022-04-25 | End: 2022-05-09

## 2022-04-25 RX ORDER — IBUPROFEN 200 MG
16 TABLET ORAL
Status: DISCONTINUED | OUTPATIENT
Start: 2022-04-25 | End: 2022-04-27 | Stop reason: HOSPADM

## 2022-04-25 RX ORDER — CELECOXIB 100 MG/1
200 CAPSULE ORAL 2 TIMES DAILY
Status: DISCONTINUED | OUTPATIENT
Start: 2022-04-25 | End: 2022-04-27 | Stop reason: HOSPADM

## 2022-04-25 RX ORDER — DEXAMETHASONE SODIUM PHOSPHATE 4 MG/ML
INJECTION, SOLUTION INTRA-ARTICULAR; INTRALESIONAL; INTRAMUSCULAR; INTRAVENOUS; SOFT TISSUE
Status: DISCONTINUED | OUTPATIENT
Start: 2022-04-25 | End: 2022-04-25

## 2022-04-25 RX ORDER — OXYCODONE HYDROCHLORIDE 10 MG/1
10 TABLET ORAL EVERY 4 HOURS PRN
Status: DISCONTINUED | OUTPATIENT
Start: 2022-04-25 | End: 2022-04-27 | Stop reason: HOSPADM

## 2022-04-25 RX ORDER — VASOPRESSIN 20 [USP'U]/ML
INJECTION, SOLUTION INTRAMUSCULAR; SUBCUTANEOUS
Status: DISCONTINUED | OUTPATIENT
Start: 2022-04-25 | End: 2022-04-25

## 2022-04-25 RX ADMIN — ONDANSETRON 4 MG: 2 INJECTION INTRAMUSCULAR; INTRAVENOUS at 11:04

## 2022-04-25 RX ADMIN — CELECOXIB 200 MG: 100 CAPSULE ORAL at 08:04

## 2022-04-25 RX ADMIN — CEFAZOLIN SODIUM 2 G: 2 SOLUTION INTRAVENOUS at 06:04

## 2022-04-25 RX ADMIN — CELECOXIB 400 MG: 100 CAPSULE ORAL at 08:04

## 2022-04-25 RX ADMIN — FAMOTIDINE 20 MG: 20 TABLET ORAL at 08:04

## 2022-04-25 RX ADMIN — PHENYLEPHRINE HYDROCHLORIDE 200 MCG: 10 INJECTION INTRAVENOUS at 11:04

## 2022-04-25 RX ADMIN — BUPIVACAINE 20 ML: 13.3 INJECTION, SUSPENSION, LIPOSOMAL INFILTRATION at 09:04

## 2022-04-25 RX ADMIN — EPHEDRINE SULFATE 10 MG: 50 INJECTION, SOLUTION INTRAMUSCULAR; INTRAVENOUS; SUBCUTANEOUS at 11:04

## 2022-04-25 RX ADMIN — PREGABALIN 75 MG: 75 CAPSULE ORAL at 08:04

## 2022-04-25 RX ADMIN — EPHEDRINE SULFATE 20 MG: 50 INJECTION, SOLUTION INTRAMUSCULAR; INTRAVENOUS; SUBCUTANEOUS at 11:04

## 2022-04-25 RX ADMIN — TRANEXAMIC ACID 1000 MG: 100 INJECTION, SOLUTION INTRAVENOUS at 11:04

## 2022-04-25 RX ADMIN — INSULIN ASPART 1 UNITS: 100 INJECTION, SOLUTION INTRAVENOUS; SUBCUTANEOUS at 10:04

## 2022-04-25 RX ADMIN — KETAMINE HYDROCHLORIDE 20 MG: 10 INJECTION, SOLUTION INTRAMUSCULAR; INTRAVENOUS at 11:04

## 2022-04-25 RX ADMIN — GLYCOPYRROLATE 0.2 MG: 0.2 INJECTION, SOLUTION INTRAMUSCULAR; INTRAVENOUS at 12:04

## 2022-04-25 RX ADMIN — MUPIROCIN: 20 OINTMENT TOPICAL at 08:04

## 2022-04-25 RX ADMIN — PROPOFOL 50 MCG/KG/MIN: 10 INJECTION, EMULSION INTRAVENOUS at 11:04

## 2022-04-25 RX ADMIN — OXYCODONE 5 MG: 5 TABLET ORAL at 01:04

## 2022-04-25 RX ADMIN — PROPOFOL 40 MG: 10 INJECTION, EMULSION INTRAVENOUS at 11:04

## 2022-04-25 RX ADMIN — SODIUM CHLORIDE 1000 ML: 0.9 INJECTION, SOLUTION INTRAVENOUS at 08:04

## 2022-04-25 RX ADMIN — SODIUM CHLORIDE, SODIUM GLUCONATE, SODIUM ACETATE, POTASSIUM CHLORIDE, MAGNESIUM CHLORIDE, SODIUM PHOSPHATE, DIBASIC, AND POTASSIUM PHOSPHATE: .53; .5; .37; .037; .03; .012; .00082 INJECTION, SOLUTION INTRAVENOUS at 08:04

## 2022-04-25 RX ADMIN — PROPOFOL 20 MG: 10 INJECTION, EMULSION INTRAVENOUS at 11:04

## 2022-04-25 RX ADMIN — EPHEDRINE SULFATE 10 MG: 50 INJECTION, SOLUTION INTRAMUSCULAR; INTRAVENOUS; SUBCUTANEOUS at 12:04

## 2022-04-25 RX ADMIN — MIDAZOLAM 2 MG: 1 INJECTION INTRAMUSCULAR; INTRAVENOUS at 09:04

## 2022-04-25 RX ADMIN — DEXAMETHASONE SODIUM PHOSPHATE 4 MG: 4 INJECTION, SOLUTION INTRA-ARTICULAR; INTRALESIONAL; INTRAMUSCULAR; INTRAVENOUS; SOFT TISSUE at 11:04

## 2022-04-25 RX ADMIN — INSULIN ASPART 2 UNITS: 100 INJECTION, SOLUTION INTRAVENOUS; SUBCUTANEOUS at 05:04

## 2022-04-25 RX ADMIN — KETAMINE HYDROCHLORIDE 40 MG: 10 INJECTION, SOLUTION INTRAMUSCULAR; INTRAVENOUS at 11:04

## 2022-04-25 RX ADMIN — GLYCOPYRROLATE 0.2 MG: 0.2 INJECTION, SOLUTION INTRAMUSCULAR; INTRAVENOUS at 11:04

## 2022-04-25 RX ADMIN — ACETAMINOPHEN 1000 MG: 10 INJECTION, SOLUTION INTRAVENOUS at 11:04

## 2022-04-25 RX ADMIN — FENTANYL CITRATE 50 MCG: 50 INJECTION, SOLUTION INTRAMUSCULAR; INTRAVENOUS at 09:04

## 2022-04-25 RX ADMIN — BUPIVACAINE HYDROCHLORIDE 1.8 ML: 7.5 INJECTION, SOLUTION EPIDURAL; RETROBULBAR at 11:04

## 2022-04-25 RX ADMIN — ASPIRIN 81 MG CHEWABLE TABLET 81 MG: 81 TABLET CHEWABLE at 08:04

## 2022-04-25 RX ADMIN — OXYCODONE HYDROCHLORIDE 10 MG: 10 TABLET, FILM COATED, EXTENDED RELEASE ORAL at 08:04

## 2022-04-25 RX ADMIN — OXYCODONE HYDROCHLORIDE 10 MG: 10 TABLET ORAL at 08:04

## 2022-04-25 RX ADMIN — VASOPRESSIN 2 UNITS: 20 INJECTION, SOLUTION INTRAMUSCULAR; SUBCUTANEOUS at 12:04

## 2022-04-25 RX ADMIN — OXYCODONE HYDROCHLORIDE 10 MG: 10 TABLET ORAL at 04:04

## 2022-04-25 RX ADMIN — BUPIVACAINE HYDROCHLORIDE 10 ML: 5 INJECTION, SOLUTION EPIDURAL; INTRACAUDAL; PERINEURAL at 09:04

## 2022-04-25 RX ADMIN — PHENYLEPHRINE HYDROCHLORIDE 200 MCG: 10 INJECTION INTRAVENOUS at 12:04

## 2022-04-25 RX ADMIN — LIDOCAINE HYDROCHLORIDE 100 MG: 20 INJECTION, SOLUTION INTRAVENOUS at 11:04

## 2022-04-25 RX ADMIN — CEFAZOLIN SODIUM 2 G: 2 SOLUTION INTRAVENOUS at 11:04

## 2022-04-25 NOTE — OP NOTE
[facility]  Surgery Department  Operative Note    SUMMARY     Date of Procedure: 4/25/2022     Procedure:  Right total knee arthroplasty robotic    Surgeon(s) and Role:     * Raghav Ortiz MD - Primary    Assisting Surgeon:  Jim    Pre-Operative Diagnosis: Primary osteoarthritis of right knee [M17.11]    Post-Operative Diagnosis: Post-Op Diagnosis Codes:     * Primary osteoarthritis of right knee [M17.11]    Anesthesia: General    Description of the Findings of the Procedure:  Patient was placed on the operating table in supine position.  The knee was prepped and draped in the usual sterile manner for surgery.  Incision was made directly over the distal tibia.  Small incision approximately a cm and half.  Two pins were placed into the tibia for the tibial array.  The tibial helene was assembled.  We now made an incision over the anterior knee.  It was carried down sharply through the skin.  It was carried down through the medial parapatellar tissues in the patella was taken laterally.  Two pens placed into the foramina for the femoral array.  We now placed a tibial checked point and femoral checked point.  At this point the knee was brought into full extension.  The robotic arise were assembled.  The body connection was made.  We now located the center of rotation of the hip.  The medial and lateral malleoli were identified.  At this point the knee was flexed to 90°.  Our femoral navigation points were identified as were our tibial navigation points.  We verified the tibia and the femur.  At this point we brought the knee into extension established our extension gap.  The flexed knee was flexed to 90°.  Airway established a flexion gap.  The implants were rotated such that we balance the flexion extension gap perfectly.  We now accepted our robotic plan.  we brought the robot and verified the robotic Sol and the femur.  The femoral cuts were now made.  All excess bone was simply removed.   Similarly ratified the tibia and the tibial blade in the tibial cut was made.  We placed a femoral trial and tibial trial.  We had full extension full flexion perfectly symmetric gaps.  We broached the tibia.  The patella was calibrated cut about was placed.  The construct trial beautifully with no lift off.  We pulse irrigated and dried the bony surfaces.  We mixed of bone cement and cemented 1st the tibia next the femur and finally the patella.  All excess cement was removed at the time that we cemented the construct was held in full extension until the cement completely hardened.  We copiously irrigated again.  At this point we closed the extensor mechanism within combination of 2. FiberWire running Quill stitch.  I irrigated and closed the subcu with 3 0 Vicryl and the skin with 4 0 Monocryl.  Sterile dressings were applied and the patient was noted to be in stable condition pending an x-ray and the neurovascular check    Complications: No    Estimated Blood Loss (EBL): 100 mL           Implants:   Implant Name Type Inv. Item Serial No.  Lot No. LRB No. Used Action   CEMENT BONE SIMPLEX HV RADPQ - [id number]  CEMENT BONE SIMPLEX HV RADPQ  [employer] MASHA.  Right 2 Implanted   PIN BONE [id number] - [id number]  PIN BONE [id number]  [employer] MASHA.  Right 1 Implanted and Explanted   PIN BONE [id number] - [id number]  PIN BONE [id number]  [employer] MAS[employer].  Right 1 Implanted and Explanted   FEMORAL CEMENTED #4 RIGHT - [id number]  FEMORAL CEMENTED #4 RIGHT  [employer] MAS[employer]. [id number] Right 1 Implanted   BASEPLATE TIB JAIME PRIM SZ 4 - [id number]  BASEPLATE TIB JAIME PRIM SZ 4  [employer] MAS[employer]. [id number] Right 1 Implanted   INSERT TIBIAL CS SIZE 4 9MM - [id number]  INSERT TIBIAL CS SIZE 4 9MM  [employer] MASHA. [id number] Right 1 Implanted   PATELLA TRI 33X9 X3 POLYETHYLE - [id number]  PATELLA TRI 33X9 X3 POLYETHYLE  [employer] MASHA. [id number] Right 1 Implanted       Specimens:   Specimen (24h ago, onward)             None                  Condition: Good    Disposition: PACU - hemodynamically stable.

## 2022-04-25 NOTE — TRANSFER OF CARE
"Anesthesia Transfer of Care Note    Patient: Earle Hoff    Procedure(s) Performed: Procedure(s) (LRB):  ROBOTIC ARTHROPLASTY, KNEE, TOTAL (Right)    Patient location: PACU    Anesthesia Type: general    Transport from OR: Transported from OR on 2-3 L/min O2 by NC with adequate spontaneous ventilation    Post pain: adequate analgesia    Post assessment: no apparent anesthetic complications    Post vital signs: stable    Level of consciousness: awake    Nausea/Vomiting: no nausea/vomiting    Complications: none    Transfer of care protocol was followed      Last vitals:   Visit Vitals  BP (!) 142/67   Pulse 63   Temp 36.6 °C (97.9 °F) (Temporal)   Resp 20   Ht 5' 9" (1.753 m)   Wt (!) 137.6 kg (303 lb 7.4 oz)   SpO2 (!) 94%   BMI 44.81 kg/m²     "

## 2022-04-25 NOTE — PLAN OF CARE
[facility]  Initial Discharge Assessment       Primary Care Provider: Jeromy Ray MD    Admission Diagnosis: Primary osteoarthritis of right knee [M17.11]    Admission Date: 4/25/2022  Expected Discharge Date:       met with patient and spouse Henny at bedside to complete assessment. Demographics, PCP and insurance verified. Patient has a RW, BSC and CPAP at home. No dialysis. Patient to discharge home with  services from [facility]. Case management to assist with any additional needs upon discharge. No further needs to address at this time.    Discharge Barriers Identified: None    Payor: HUMANA / Plan: HUMANA  PPO / Product Type: PPO /     Extended Emergency Contact Information  Primary Emergency Contact: Henny Hoff  Address: [address] [place] [address] [place]  Mobile Phone: [phone]  Relation: Spouse  Preferred language: English   needed? No    Discharge Plan A: Home Health  Discharge Plan B: Home with family      [facility] [address] [place] [address], [place] - [address] [address]  Phone: [phone] Fax: [phone]      Initial Assessment (most recent)     Adult Discharge Assessment - 04/25/22 1528        Discharge Assessment    Assessment Type Discharge Planning Assessment     Confirmed/corrected address, phone number and insurance Yes     Confirmed Demographics Correct on Facesheet     Source of Information patient;family     Lives With spouse     Do you expect to return to your current living situation? Yes     Do you have help at home or someone to help you manage your care at home? Yes     Who are your caregiver(s) and their phone number(s)? Henny Hoff (Spouse)   [phone] (Mobile)     Prior to hospitilization cognitive status: Alert/Oriented     Current cognitive status: Alert/Oriented     Walking or Climbing Stairs Difficulty none      Dressing/Bathing Difficulty none     Home Accessibility wheelchair accessible     Equipment Currently Used at Home bedside commode;walker, rolling;CPAP     Patient currently being followed by outpatient case management? No     Do you currently have service(s) that help you manage your care at home? No     Do you take prescription medications? Yes     Do you have prescription coverage? Yes     Do you have any problems affording any of your prescribed medications? No     Is the patient taking medications as prescribed? yes     Who is going to help you get home at discharge? Henny Hoff (Spouse)   [phone] (Mobile)     How do you get to doctors appointments? car, drives self     Are you on dialysis? No     Do you take coumadin? No     Discharge Plan A Home Health     Discharge Plan B Home with family     DME Needed Upon Discharge  none     Discharge Plan discussed with: Patient;Spouse/sig other     Name(s) and Number(s) Henny Hoff (Spouse)   [phone] (Mobile)     Discharge Barriers Identified None        Relationship/Environment    Name(s) of Who Lives With Patient Henny Hoff (Spouse)   [phone] (Mobile)

## 2022-04-25 NOTE — H&P
CC/Indication for Procedure: 62 y.o. male with Primary osteoarthritis of right knee [M17.11].    Patient scheduled for CA TOTAL KNEE ARTHROPLASTY [[id number]] (ROBOTIC ARTHROPLASTY, KNEE, TOTAL).    Past Medical History:   Diagnosis Date    Arthritis     Diabetes mellitus     Edema, lower extremity     Gout     Hyperlipidemia     Stasis ulcer of lower extremity, left      Past Surgical History:   Procedure Laterality Date    KNEE ARTHROSCOPY W/ MENISCAL REPAIR      left knee meniscus repair      left shoulder rotator cuff repair      SHOULDER ARTHROSCOPY      TONSILLECTOMY       Family History   Problem Relation Age of Onset    No Known Problems Mother     Diabetes Mellitus Father     No Known Problems Sister     No Known Problems Brother     No Known Problems Daughter     No Known Problems Son     No Known Problems Maternal Aunt     No Known Problems Maternal Uncle     No Known Problems Paternal Aunt     No Known Problems Paternal Uncle     No Known Problems Maternal Grandmother     No Known Problems Maternal Grandfather     No Known Problems Paternal Grandmother     No Known Problems Paternal Grandfather      Social History     Socioeconomic History    Marital status:    Tobacco Use    Smoking status: Current Some Day Smoker     Packs/day: 0.50     Years: 40.00     Pack years: 20.00     Types: Cigarettes     Last attempt to quit: 12/31/2018     Years since quitting: 3.3    Smokeless tobacco: Never Used   Substance and Sexual Activity    Alcohol use: No    Drug use: No    Sexual activity: Yes       Review of patient's allergies indicates:   Allergen Reactions    Pcn [penicillins]          Current Facility-Administered Medications:     electrolyte-S (ISOLYTE), , Intravenous, Continuous, Dexter Loving MD, Last Rate: 10 mL/hr at 04/25/22 0837, New Bag at 04/25/22 0837    LIDOcaine (PF) 10 mg/ml (1%) injection 10 mg, 1 mL, Intradermal, Once, Dexter Loving MD    mupirocin 2 %  ointment, , Nasal, On Call Procedure, Raghav Ortiz MD, Given at 04/25/22 0842    sodium chloride 0.9% 49.3 mL with ROPIvacaine 0.5% (PF) (NAROPIN) 49.3 mL, ketorolac (TORADOL) 30 mg, EPINEPHrine (ADRENALINE) 0.5 mg injection, , Other, Once, Raghav Ortiz MD    Facility-Administered Medications Ordered in Other Encounters:     fentaNYL 50 mcg/mL injection, , Intravenous, PRN, Clem Heller RN, 50 mcg at 04/25/22 0945    midazolam injection, , Intravenous, PRN, Clem Heller RN, 2 mg at 04/25/22 0945    ROS:    Denies chest pain or palpitations  Denies shortness of breath  Denies fevers or chills  Denies chest pain  Denies abdominal pain    PE:    General Appearance: Well nourished  Orientation: Oriented to time, place, person  Mental Status: Alert  Heart: RRR  Lungs: CTA  Abdomen: Soft and non-tender    Anesthesia/Surgery risks, benefits and alternative options discussed and understood by patient/family.    This note was created using Dragon voice recognition software that occasionally misinterpreted phrases or words.

## 2022-04-25 NOTE — PLAN OF CARE
Plan of care discussed. Pain controlled. Dsg @ L leg cdi. Scd in use. Worked with PT. BG monitored. Room air. Safety maintained with q2 hourly rounding. Family at bedside.

## 2022-04-25 NOTE — PLAN OF CARE
Patient stable, awake and alert. Met transfer criteria per Anesthesiologist. Pain controlled, denies nausea. VS'S. Dressing clean, dry and intact. IS and polar ice machine transferred with patient. Patient transported to post op.  Bedside report given to Navid

## 2022-04-25 NOTE — PT/OT/SLP EVAL
Physical Therapy Evaluation    Patient Name:  Earle Hoff   MRN:  [id number]    Recommendations:     Discharge Recommendations:  home health PT   Discharge Equipment Recommendations: bath bench (has RW and BSC)   Barriers to discharge: None    Assessment:     Earle Hoff is a 62 y.o. male admitted with a medical diagnosis of <principal problem not specified>.  He presents with the following impairments/functional limitations:  weakness, impaired endurance, impaired sensation, impaired functional mobilty, gait instability, impaired balance, decreased lower extremity function, decreased safety awareness, pain, decreased ROM, orthopedic precautions. Patient is POD #0 right TKA. He is agreeable to participation with PT evaluation. He reports 5/10 right knee pain at rest. He was educated on right LE WBAT. He requires Johnny for supine to sit transfer and endorses mild dizziness upon sitting EOB that resolved. He requires ModA for sit to stand transfer with RW stabilized by PT tech. He ambulated 15' in room with RW, Min-ModA, RLE WBAT, and step to gait pattern requiring verbal cueing for sequencing and to take time with 2 mild LOB noted with assistance for recovery. He returned to bed with bed alarm on, family present, and RN notified.      Rehab Prognosis: Good; patient would benefit from acute skilled PT services to address these deficits and reach maximum level of function.    Recent Surgery: Procedure(s) (LRB):  ROBOTIC ARTHROPLASTY, KNEE, TOTAL (Right) Day of Surgery    Plan:     During this hospitalization, patient to be seen BID to address the identified rehab impairments via gait training, therapeutic activities, therapeutic exercises and progress toward the following goals:    · Plan of Care Expires:  05/25/22    Subjective     Chief Complaint: dizzy with transfers   Patient/Family Comments/goals: try to walk a little   Pain/Comfort:  · Pain Rating 1: 5/10  · Location - Side 1: Right  · Location 1:  knee  · Pain Addressed 1: Reposition, Distraction  · Pain Rating Post-Intervention 1: 6/10  · Location - Side 2: Right  · Location 2: knee  · Pain Addressed 2: Cessation of Activity, Nurse notified    Patients cultural, spiritual, Mu-ism conflicts given the current situation:      Living Environment:  Patient lives with spouse in a H with 1 [address]   Prior to admission, patients level of function was independent using knee brace for ambulation. He does endorse 2 falls in the past year due to knee buckling noting he hurt his back requiring PT. He requires assistance donning socks due to limited knee flexion. He does not work.  Equipment used at home:  (knee brace).  DME owned (not currently used): rolling walker and bedside commode.  Upon discharge, patient will have assistance from HHPT and spouse.    Objective:     Communicated with DHAVAL Virk prior to session.  Patient found HOB elevated with bed alarm, cryotherapy, peripheral IV, SCD  upon PT entry to room.    General Precautions: Standard, fall   Orthopedic Precautions:RLE weight bearing as tolerated   Braces: N/A  Respiratory Status: Room air    Exams:  · RLE ROM: 70 degrees of knee flexion sitting EOB  · LLE Strength: WFL    Functional Mobility:  · Bed Mobility:     · Supine to Sit: minimum assistance  · Sit to Supine: stand by assistance  · Transfers:     · Sit to Stand:  moderate assistance with rolling walker  · Gait: 15' in room with RW, Min-ModA, RLE WBAT, and step to gait pattern requiring verbal cueing for sequencing and to take time with 2 mild LOB noted with assistance for recovery    Therapeutic Activities and Exercises:   Patient was educated on the importance of OOB activity and functional mobility to negate negative effects of prolonged bed rest during hospitalization, safe transfers and ambulation, RLE WBAT, and D/C planning     Patient performed 10 reps of right LE therapeutic exercise including quad sets, straight leg raises, short arc quads,  long arc quads, and heel slides     AM-PAC 6 CLICK MOBILITY  Total Score:13     Patient left HOB elevated with all lines intact, call button in reach, bed alarm on, RN notified and family present.    GOALS:   Multidisciplinary Problems     Physical Therapy Goals        Problem: Physical Therapy    Goal Priority Disciplines Outcome Goal Variances Interventions   Physical Therapy Goal     PT, PT/OT      Description: Goals to be met by: 22    Patient will increase functional independence with mobility by performin. Supine to sit with Supervision  2. Sit to stand transfer with Supervision  3. Bed to chair transfer with Supervision using Rolling Walker  4. Gait  x 250 feet with Supervision using Rolling Walker.   5. Lower extremity exercise program x20 reps per handout, with supervision                   History:     Past Medical History:   Diagnosis Date    Arthritis     Diabetes mellitus     Edema, lower extremity     Gout     Hyperlipidemia     Stasis ulcer of lower extremity, left        Past Surgical History:   Procedure Laterality Date    KNEE ARTHROSCOPY W/ MENISCAL REPAIR      left knee meniscus repair      left shoulder rotator cuff repair      SHOULDER ARTHROSCOPY      TONSILLECTOMY         Time Tracking:     PT Received On: 22  PT Start Time: 1539     PT Stop Time: 1609  PT Total Time (min): 30 min     Billable Minutes: Evaluation 10 and Therapeutic Exercise 20      2022

## 2022-04-25 NOTE — H&P
History and Physical for Admission  [facility]    Earle Hoff (MRN: [id number])  Admitting Service: Hospital Medicine  Date of Admission: 4/25/2022   Primary Care Provider: Jeromy Ray MD    ?  Chief complaint: Right knee pain   ?  History of Present Illness:     62-year-old male with history of diabetes and venous stasis presents with right knee pain now status post total right knee arthroplasty who will be observed overnight.    The patient reports his knee pain has been worsening over course of months such that is now severe, essentially constant and worsened by minimal movement.  Pain medications over the counter were helpful initially but now provide minimal relief.    Patient was seen after surgery.  No chest pain, shortness or lightheadedness.  Denies nausea or vomiting.  Believes he can tolerate a diet.  Pain is controlled with current regimen.    ?  ?  Review of Systems:   Constitutional: ; no fevers/chills, night sweats, weight changes, fatigue, recent illnesses, headaches  Eyes: ; denies vision changes including recent decrease in vision, double vision or blurriness  Ears, Nose, Mouth, Throat: denies hearing abnormalities, tinnitus, rhinorrhea, sinus pain/congestion, dysphagia, sore throat, hoarseness, neck pain  Cardiovascular: denies chest pain, palpitations, syncope, STOVER, PND, orthopnea  Respiratory: ; denies dyspnea, cough, wheezing  GI: denies abdominal pain, nausea, vomiting, diarrhea, constipation, melena,   :  denies dysuria, hematuria, polyuria, incontinence  MSK:  See HPI  Integument:  denies rashes, lesions, skin changes  Neuro: denies weakness, paresthesia, tremor, dizziness  Psych: denies history of anxiety/depression  Endocrine: ; denies polyuria/polydipsia, polyphagia, heat/cold intolerance,  Hematologic/lymphatic:  denies easy bleeding/bruising, LAD    ?  Medical History    PAST MEDICAL HISTORY:  has a past medical history of Arthritis, Diabetes mellitus, Edema, lower  "extremity, Gout, Hyperlipidemia, and Stasis ulcer of lower extremity, left.    PAST SURGICAL HISTORY:  has a past surgical history that includes Shoulder arthroscopy; Knee arthroscopy w/ meniscal repair; Tonsillectomy; left shoulder rotator cuff repair; and left knee meniscus repair.  ?  PAST SOCIAL HISTORY:  reports that he has been smoking cigarettes. He has a 20.00 pack-year smoking history. He has never used smokeless tobacco. He reports that he does not drink alcohol and does not use drugs.    FAMILY HISTORY: family history includes Diabetes Mellitus in his father; No Known Problems in his brother, daughter, maternal aunt, maternal grandfather, maternal grandmother, maternal uncle, mother, paternal aunt, paternal grandfather, paternal grandmother, paternal uncle, sister, and son.    ?  CURRENT OUTPATIENT MEDS  Allergies:   Review of patient's allergies indicates:   Allergen Reactions    Pcn [penicillins]          ?  PHYSICAL EXAM  Vitals: /62   Pulse 78   Temp 97.7 °F (36.5 °C)   Resp 18   Ht 5' 9" (1.753 m)   Wt (!) 137.6 kg (303 lb 7.4 oz)   SpO2 96%   BMI 44.81 kg/m²  Body mass index is 44.81 kg/m².    General: NAD, AO3, obese  HEENT: PERRL, EOMI.   Cardiovascular: RRR  Respiratory: lungs CTAB  GI: abdomen S/NT/ND, +BS  Extremities: no edema  MSK: moves upper extremities.   Neuro/Psych: A&OX3. CNII-XII grossly intact.      EKG and radiographs from the past 24h: reviewed and personally interpreted if available.      LABS    No results for input(s): WBC, HGB, PLT in the last 72 hours.  ?  No results for input(s): NA, K, CHLORIDE, CO2, BUN, CREATININE, MG, PHOS in the last 72 hours.  ?  No results for input(s): BILITOT, AST, ALT, ALKPHOS, PROT, ALB in the last 72 hours.  ?  No results for input(s): INR, PT, PTT in the last 72 hours.  ?    ASSESSMENT & PLAN    62-year-old male with history of diabetes and venous stasis presents with right knee pain now status post total right knee arthroplasty who " will be observed overnight.    1. Status post total right knee arthroplasty, right knee osteoarthritis   -pain control, physical therapy/occupational therapy  -periprocedural antibiotics per surgery  -DVT prophylaxis when allowed by surgery    2. Diabetes  -hold metformin  -insulin sliding scale    3. Venous stasis  -hold Lasix; he reports he takes it once a week and has never had respiratory symptoms from being volume overloaded    Moderately complex medical decision making, chart reviewed.    Code Status/Dispo: Prior.   ?  Duane Hicks    Date: 4/25/2022  Time: 3:26 PM

## 2022-04-26 PROBLEM — Z96.651 STATUS POST TOTAL RIGHT KNEE REPLACEMENT: Status: ACTIVE | Noted: 2022-04-26

## 2022-04-26 LAB
ANION GAP SERPL CALC-SCNC: 10 MMOL/L (ref 8–16)
ANION GAP SERPL CALC-SCNC: 12 MMOL/L (ref 8–16)
BASOPHILS # BLD AUTO: 0.01 K/UL (ref 0–0.2)
BASOPHILS NFR BLD: 0.1 % (ref 0–1.9)
BUN SERPL-MCNC: 20 MG/DL (ref 8–23)
BUN SERPL-MCNC: 21 MG/DL (ref 8–23)
CALCIUM SERPL-MCNC: 8.8 MG/DL (ref 8.7–10.5)
CALCIUM SERPL-MCNC: 9 MG/DL (ref 8.7–10.5)
CHLORIDE SERPL-SCNC: 103 MMOL/L (ref 95–110)
CHLORIDE SERPL-SCNC: 105 MMOL/L (ref 95–110)
CO2 SERPL-SCNC: 20 MMOL/L (ref 23–29)
CO2 SERPL-SCNC: 23 MMOL/L (ref 23–29)
CREAT SERPL-MCNC: 1.5 MG/DL (ref 0.5–1.4)
CREAT SERPL-MCNC: 1.5 MG/DL (ref 0.5–1.4)
DIFFERENTIAL METHOD: ABNORMAL
EOSINOPHIL # BLD AUTO: 0 K/UL (ref 0–0.5)
EOSINOPHIL NFR BLD: 0 % (ref 0–8)
ERYTHROCYTE [DISTWIDTH] IN BLOOD BY AUTOMATED COUNT: 14.8 % (ref 11.5–14.5)
EST. GFR  (AFRICAN AMERICAN): 57 ML/MIN/1.73 M^2
EST. GFR  (AFRICAN AMERICAN): 57 ML/MIN/1.73 M^2
EST. GFR  (NON AFRICAN AMERICAN): 49 ML/MIN/1.73 M^2
EST. GFR  (NON AFRICAN AMERICAN): 49 ML/MIN/1.73 M^2
GLUCOSE SERPL-MCNC: 263 MG/DL (ref 70–110)
GLUCOSE SERPL-MCNC: 319 MG/DL (ref 70–110)
HCT VFR BLD AUTO: 35.9 % (ref 40–54)
HGB BLD-MCNC: 11.6 G/DL (ref 14–18)
IMM GRANULOCYTES # BLD AUTO: 0.04 K/UL (ref 0–0.04)
IMM GRANULOCYTES NFR BLD AUTO: 0.4 % (ref 0–0.5)
LYMPHOCYTES # BLD AUTO: 0.9 K/UL (ref 1–4.8)
LYMPHOCYTES NFR BLD: 7.6 % (ref 18–48)
MAGNESIUM SERPL-MCNC: 2.1 MG/DL (ref 1.6–2.6)
MCH RBC QN AUTO: 30 PG (ref 27–31)
MCHC RBC AUTO-ENTMCNC: 32.3 G/DL (ref 32–36)
MCV RBC AUTO: 93 FL (ref 82–98)
MONOCYTES # BLD AUTO: 0.5 K/UL (ref 0.3–1)
MONOCYTES NFR BLD: 4.8 % (ref 4–15)
NEUTROPHILS # BLD AUTO: 9.9 K/UL (ref 1.8–7.7)
NEUTROPHILS NFR BLD: 87.1 % (ref 38–73)
NRBC BLD-RTO: 0 /100 WBC
PLATELET # BLD AUTO: 197 K/UL (ref 150–450)
PMV BLD AUTO: 10.8 FL (ref 9.2–12.9)
POCT GLUCOSE: 243 MG/DL (ref 70–110)
POCT GLUCOSE: 268 MG/DL (ref 70–110)
POCT GLUCOSE: 271 MG/DL (ref 70–110)
POTASSIUM SERPL-SCNC: 4.6 MMOL/L (ref 3.5–5.1)
POTASSIUM SERPL-SCNC: 5.1 MMOL/L (ref 3.5–5.1)
RBC # BLD AUTO: 3.87 M/UL (ref 4.6–6.2)
SODIUM SERPL-SCNC: 135 MMOL/L (ref 136–145)
SODIUM SERPL-SCNC: 138 MMOL/L (ref 136–145)
WBC # BLD AUTO: 11.32 K/UL (ref 3.9–12.7)

## 2022-04-26 PROCEDURE — 83735 ASSAY OF MAGNESIUM: CPT | Performed by: INTERNAL MEDICINE

## 2022-04-26 PROCEDURE — 97165 OT EVAL LOW COMPLEX 30 MIN: CPT

## 2022-04-26 PROCEDURE — 96360 HYDRATION IV INFUSION INIT: CPT

## 2022-04-26 PROCEDURE — 97116 GAIT TRAINING THERAPY: CPT | Mod: 97

## 2022-04-26 PROCEDURE — 94799 UNLISTED PULMONARY SVC/PX: CPT

## 2022-04-26 PROCEDURE — 94761 N-INVAS EAR/PLS OXIMETRY MLT: CPT

## 2022-04-26 PROCEDURE — 25000003 PHARM REV CODE 250: Performed by: INTERNAL MEDICINE

## 2022-04-26 PROCEDURE — 63600175 PHARM REV CODE 636 W HCPCS: Performed by: INTERNAL MEDICINE

## 2022-04-26 PROCEDURE — 36415 COLL VENOUS BLD VENIPUNCTURE: CPT | Performed by: INTERNAL MEDICINE

## 2022-04-26 PROCEDURE — 80048 BASIC METABOLIC PNL TOTAL CA: CPT | Performed by: INTERNAL MEDICINE

## 2022-04-26 PROCEDURE — 25000003 PHARM REV CODE 250: Performed by: ORTHOPAEDIC SURGERY

## 2022-04-26 PROCEDURE — 96372 THER/PROPH/DIAG INJ SC/IM: CPT | Mod: 59 | Performed by: INTERNAL MEDICINE

## 2022-04-26 PROCEDURE — 63600175 PHARM REV CODE 636 W HCPCS: Performed by: ORTHOPAEDIC SURGERY

## 2022-04-26 PROCEDURE — 97110 THERAPEUTIC EXERCISES: CPT

## 2022-04-26 PROCEDURE — 97535 SELF CARE MNGMENT TRAINING: CPT | Mod: 97

## 2022-04-26 PROCEDURE — C9399 UNCLASSIFIED DRUGS OR BIOLOG: HCPCS | Performed by: INTERNAL MEDICINE

## 2022-04-26 PROCEDURE — 99900035 HC TECH TIME PER 15 MIN (STAT)

## 2022-04-26 PROCEDURE — 85025 COMPLETE CBC W/AUTO DIFF WBC: CPT | Performed by: ORTHOPAEDIC SURGERY

## 2022-04-26 PROCEDURE — G0378 HOSPITAL OBSERVATION PER HR: HCPCS

## 2022-04-26 RX ORDER — SODIUM CHLORIDE 9 MG/ML
INJECTION, SOLUTION INTRAVENOUS CONTINUOUS
Status: DISCONTINUED | OUTPATIENT
Start: 2022-04-26 | End: 2022-04-27 | Stop reason: HOSPADM

## 2022-04-26 RX ADMIN — INSULIN ASPART 3 UNITS: 100 INJECTION, SOLUTION INTRAVENOUS; SUBCUTANEOUS at 08:04

## 2022-04-26 RX ADMIN — CEFAZOLIN SODIUM 2 G: 2 SOLUTION INTRAVENOUS at 02:04

## 2022-04-26 RX ADMIN — INSULIN DETEMIR 5 UNITS: 100 INJECTION, SOLUTION SUBCUTANEOUS at 08:04

## 2022-04-26 RX ADMIN — INSULIN ASPART 2 UNITS: 100 INJECTION, SOLUTION INTRAVENOUS; SUBCUTANEOUS at 12:04

## 2022-04-26 RX ADMIN — FAMOTIDINE 20 MG: 20 TABLET ORAL at 08:04

## 2022-04-26 RX ADMIN — CELECOXIB 200 MG: 100 CAPSULE ORAL at 08:04

## 2022-04-26 RX ADMIN — ASPIRIN 81 MG CHEWABLE TABLET 81 MG: 81 TABLET CHEWABLE at 08:04

## 2022-04-26 RX ADMIN — INSULIN HUMAN 5 UNITS: 100 INJECTION, SOLUTION PARENTERAL at 12:04

## 2022-04-26 RX ADMIN — INSULIN ASPART 3 UNITS: 100 INJECTION, SOLUTION INTRAVENOUS; SUBCUTANEOUS at 05:04

## 2022-04-26 RX ADMIN — OXYCODONE HYDROCHLORIDE 10 MG: 10 TABLET ORAL at 10:04

## 2022-04-26 RX ADMIN — SODIUM CHLORIDE: 0.9 INJECTION, SOLUTION INTRAVENOUS at 12:04

## 2022-04-26 RX ADMIN — SODIUM CHLORIDE 1000 ML: 0.9 INJECTION, SOLUTION INTRAVENOUS at 12:04

## 2022-04-26 NOTE — SUBJECTIVE & OBJECTIVE
"Principal Problem:<principal problem not specified>    Principal Orthopedic Problem: S/P R TKA    Interval History: none    Review of patient's allergies indicates:   Allergen Reactions    Pcn [penicillins]        Current Facility-Administered Medications   Medication    aspirin chewable tablet 81 mg    bisacodyL suppository 10 mg    celecoxib capsule 200 mg    dextrose 10% bolus 125 mL    dextrose 10% bolus 250 mL    famotidine tablet 20 mg    glucagon (human recombinant) injection 1 mg    glucose chewable tablet 16 g    glucose chewable tablet 24 g    insulin aspart U-100 pen 0-5 Units    morphine injection 2 mg    ondansetron disintegrating tablet 8 mg    oxyCODONE immediate release tablet Tab 10 mg    polyethylene glycol packet 17 g    sodium chloride 0.9% flush 10 mL    sodium chloride 0.9% flush 10 mL    sodium chloride 0.9% flush 5 mL    zolpidem tablet 5 mg     Objective:     Vital Signs (Most Recent):  Temp: 97.8 °F (36.6 °C) (04/26/22 0410)  Pulse: 74 (04/26/22 0410)  Resp: 20 (04/26/22 0410)  BP: (!) 140/71 (04/26/22 0410)  SpO2: 98 % (04/26/22 0410)   Vital Signs (24h Range):  Temp:  [96.3 °F (35.7 °C)-98.1 °F (36.7 °C)] 97.8 °F (36.6 °C)  Pulse:  [63-82] 74  Resp:  [6-20] 20  SpO2:  [93 %-98 %] 98 %  BP: (112-142)/(55-80) 140/71     Weight: (!) 137.6 kg (303 lb 7.4 oz)  Height: 5' 9" (175.3 cm)  Body mass index is 44.81 kg/m².      Intake/Output Summary (Last 24 hours) at 4/26/2022 0623  Last data filed at 4/25/2022 1828  Gross per 24 hour   Intake 3030 ml   Output 100 ml   Net 2930 ml       General    Nursing note and vitals reviewed.  Constitutional: He is oriented to person, place, and time.   Obese   Pulmonary/Chest: Effort normal.   Neurological: He is alert and oriented to person, place, and time.   Psychiatric: He has a normal mood and affect. His behavior is normal.           Right Knee Exam     Comments:  RLE DNVI. Dressings C/D/I    Significant Labs: CBC:   Recent Labs   Lab 04/26/22  0427 "   WBC 11.32   HGB 11.6*   HCT 35.9*        CMP: No results for input(s): NA, K, CL, CO2, GLU, BUN, CREATININE, CALCIUM, PROT, ALBUMIN, BILITOT, ALKPHOS, AST, ALT, ANIONGAP, EGFRNONAA in the last 48 hours.    Invalid input(s): ESTGFAFRICA  All pertinent labs within the past 24 hours have been reviewed.    Significant Imaging: There has been a cemented tricompartmental right knee arthroplasty.  All hardware components are well seated.  There is no fracture.  Expected postoperative soft tissue gas is noted.

## 2022-04-26 NOTE — PT/OT/SLP EVAL
Occupational Therapy   Evaluation    Name: Earle Hoff  MRN: [id number]  Admitting Diagnosis:  Status post total right knee replacement  Recent Surgery: Procedure(s) (LRB):  ROBOTIC ARTHROPLASTY, KNEE, TOTAL (Right) 1 Day Post-Op    Recommendations:     Discharge Recommendations: home health OT  Discharge Equipment Recommendations:  none  Barriers to discharge:       Assessment:     Earle Hoff is a 62 y.o. male with a medical diagnosis of Status post total right knee replacement.  He presents with the following performance deficits affecting function: weakness, impaired endurance, impaired self care skills, impaired functional mobilty, gait instability, decreased lower extremity function, pain, decreased safety awareness, decreased ROM, orthopedic precautions.  Pt was up in chair and agreeable to OT. Spouse present. Pt demonstrates BUE AROM/strength WNL's. OT provided instruction in home safety with ADL's/IADL's including review of home set up and DME/AE. OT provided educaiton in use of AE for lower body dressing and bathing. Pt/spouse verbalized understanding. Pt reacher and aid for donning compression stockings at home. Pt required Min assist with sit to stand with RW. Pt ambulated to/from bathroom with CGA and completed toileting in standing with CGA. OT provided education in calling for assist. Pt/spouse verbalized understanding. Recommend HHOT at discharge.    Rehab Prognosis: Good; patient would benefit from acute skilled OT services to address these deficits and reach maximum level of function.       Plan:     Patient to be seen 4 x/week to address the above listed problems via self-care/home management, therapeutic activities, therapeutic exercises  · Plan of Care Expires: 05/24/22  · Plan of Care Reviewed with: patient, spouse    Subjective     Chief Complaint: Pain  Patient/Family Comments/goals: To go home    Occupational Profile:  Living Environment: Pt lives with spouse in 1 story home with 1  ISAÍAS. Pt has tub/shower and standard toilet. Pt will use BSC over toilet and has a tub transfer bench from mother in law.  Previous level of function: Independent  Equipment Used at Home:  walker, rolling, bedside commode, other (see comments), bath bench (Pt has reacher and device for donning compression stockings.)  Assistance upon Discharge: Spouse    Pain/Comfort:  · Pain Rating 1: 4/10  · Location - Side 1: Right  · Location 1: knee  · Pain Rating Post-Intervention 1: 4/10    Patients cultural, spiritual, Quaker conflicts given the current situation:      Objective:     Communicated with: Nurse Virk prior to session.  Patient found up in chair with chair check, cryotherapy, peripheral IV upon OT entry to room.    General Precautions: Standard, fall   Orthopedic Precautions:RLE weight bearing as tolerated   Braces: N/A  Respiratory Status: Room air    Occupational Performance:      Functional Mobility/Transfers:  · Patient completed Sit <> Stand Transfer with minimum assistance  with  rolling walker   · Functional Mobility: Pt was able to ambulate to/from bathroom with RW and CGA.    Activities of Daily Living:  · Feeding:  independence    · Grooming: stand by assistance set up in sitting  · Bathing: moderate assistance    · Upper Body Dressing: stand by assistance set up in sitting  · Lower Body Dressing: moderate assistance    · Toileting: contact guard assistance      Cognitive/Visual Perceptual:  Pt alert and oriented    Physical Exam:  Upper Extremity Strength:    -       Right Upper Extremity: WNL  -       Left Upper Extremity: WNL    AMPAC 6 Click ADL:  AMPAC Total Score: 18    Treatment & Education:  OT provided education in role of OT. Pt/spouse verbalized understanding and pt was agreeable to OT.  OT provided instruction in home safety with ADL's/IADL's including review of home set up and DME/AE. OT provided education in use of AE for lower body dressing and bathing. Pt/spouse verbalized  understanding.  Education:    Patient left up in chair with all lines intact, call button in reach, chair alarm on and spouse present    GOALS:   Multidisciplinary Problems     Occupational Therapy Goals        Problem: Occupational Therapy    Goal Priority Disciplines Outcome Interventions   Occupational Therapy Goal     OT, PT/OT     Description: Goals to be met by: 5/24/22    Patient will increase functional independence with ADLs by performing:    UE Dressing with Modified [place].  LE Dressing with Modified [place].  Grooming while standing at sink with Modified [place].  Toileting from bedside commode with Modified [place] for hygiene and clothing management.   Bathing from  shower chair/bench with Modified [place].  Toilet transfer to bedside commode with Modified [place].  Increased strength and functional activity tolerance for ADL's/IADL's as evidenced by requiring less assistance with these tasks.                     History:     Past Medical History:   Diagnosis Date    Arthritis     Diabetes mellitus     Edema, lower extremity     Gout     Hyperlipidemia     Stasis ulcer of lower extremity, left        Past Surgical History:   Procedure Laterality Date    KNEE ARTHROSCOPY W/ MENISCAL REPAIR      left knee meniscus repair      left shoulder rotator cuff repair      SHOULDER ARTHROSCOPY      TONSILLECTOMY         Time Tracking:     OT Date of Treatment: 04/26/22  OT Start Time: 0934  OT Stop Time: 0957  OT Total Time (min): 23 min    Billable Minutes:Evaluation 8  Self Care/Home Management 15    4/26/2022

## 2022-04-26 NOTE — PT/OT/SLP PROGRESS
Physical Therapy Treatment    Patient Name:  Earle Hoff   MRN:  [id number]    Recommendations:     Discharge Recommendations:  home health PT   Discharge Equipment Recommendations: bath bench (has RW and BSC)   Barriers to discharge: None    Assessment:     Earle Hoff is a 62 y.o. male admitted with a medical diagnosis of Status post total right knee replacement.  He presents with the following impairments/functional limitations:  weakness, impaired endurance, impaired self care skills, impaired functional mobilty, gait instability, impaired balance, decreased lower extremity function, decreased safety awareness, pain, decreased ROM, orthopedic precautions. Patient sitting up in chair and is agreeable to participation with second PT treatment of the day. He requires CGA for sit to stand transfer from chair with RW. He ambulated 140' with RW, CGA, RLE WBAT, and step through gait pattern. He requests to conserve some energy for upcoming discharge and returned to room agreeable to remain sitting up in chair with all questions answered, spouse present, and RN notified.     Rehab Prognosis: Good; patient would benefit from acute skilled PT services to address these deficits and reach maximum level of function.    Recent Surgery: Procedure(s) (LRB):  ROBOTIC ARTHROPLASTY, KNEE, TOTAL (Right) 1 Day Post-Op    Plan:     During this hospitalization, patient to be seen BID to address the identified rehab impairments via gait training, therapeutic activities, therapeutic exercises and progress toward the following goals:    · Plan of Care Expires:  05/25/22    Subjective     Chief Complaint: ready for D/C   Patient/Family Comments/goals: home   Pain/Comfort:  · Pain Rating 1: 0/10  · Pain Addressed 1: Pre-medicate for activity  · Pain Rating 2: 3/10  · Location - Side 2: Right  · Location 2: knee  · Pain Addressed 2: Cessation of Activity      Objective:     Communicated with DHAVAL Virk prior to session.  Patient found  up in chair with chair check, cryotherapy, peripheral IV upon PT entry to room.     General Precautions: Standard, fall   Orthopedic Precautions:RLE weight bearing as tolerated   Braces: N/A  Respiratory Status: Room air     Functional Mobility:  · Transfers:     · Sit to Stand:  contact guard assistance with rolling walker  · Gait: 140' with RW, CGA, RLE WBAT, and step through gait pattern      AM-PAC 6 CLICK MOBILITY  Turning over in bed (including adjusting bedclothes, sheets and blankets)?: 4  Sitting down on and standing up from a chair with arms (e.g., wheelchair, bedside commode, etc.): 4  Moving from lying on back to sitting on the side of the bed?: 4  Moving to and from a bed to a chair (including a wheelchair)?: 4  Need to walk in hospital room?: 4  Climbing 3-5 steps with a railing?: 2  Basic Mobility Total Score: 22       Therapeutic Activities and Exercises:   Patient was educated on the importance of OOB activity and functional mobility to negate negative effects of prolonged bed rest during hospitalization, safe transfers and ambulation, HHPT with transition to OPPT, fall prevention, RW and BSC use, cryo use, car transfers, HEP, and D/C planning     Patient left up in chair with all lines intact, call button in reach, chair alarm on, RN notified and spouse present..    GOALS:   Multidisciplinary Problems     Physical Therapy Goals        Problem: Physical Therapy    Goal Priority Disciplines Outcome Goal Variances Interventions   Physical Therapy Goal     PT, PT/OT Ongoing, Progressing     Description: Goals to be met by: 22    Patient will increase functional independence with mobility by performin. Supine to sit with Supervision  2. Sit to stand transfer with Supervision  3. Bed to chair transfer with Supervision using Rolling Walker  4. Gait  x 250 feet with Supervision using Rolling Walker.   5. Lower extremity exercise program x20 reps per handout, with supervision                    Time Tracking:     PT Received On: 04/26/22  PT Start Time: 1054     PT Stop Time: 1109  PT Total Time (min): 15 min     Billable Minutes: Gait Training 15    Treatment Type: Treatment  PT/PTA: PT     PTA Visit Number: 0     04/26/2022

## 2022-04-26 NOTE — PLAN OF CARE
Plan of care continues. IVF infusing for new lisbeth. BG monitored, elevated, insulin given per orders. Dr Ortiz made aware of cancelled discharge. Did well with PT. Pain controlled. Scd's on. Home cpap used when pt is sleeping. Q2 hourly rounding for safety.

## 2022-04-26 NOTE — ANESTHESIA POSTPROCEDURE EVALUATION
Anesthesia Post Evaluation    Patient: Earle Hoff    Procedure(s) Performed: Procedure(s) (LRB):  ROBOTIC ARTHROPLASTY, KNEE, TOTAL (Right)    Final Anesthesia Type: spinal      Patient location during evaluation: PACU  Patient participation: Yes- Able to Participate  Level of consciousness: awake and alert  Post-procedure vital signs: reviewed and stable  Pain management: adequate  Airway patency: patent    PONV status at discharge: No PONV  Anesthetic complications: no      Cardiovascular status: hemodynamically stable  Respiratory status: unassisted and room air  Hydration status: euvolemic  Follow-up not needed.          Vitals Value Taken Time   /62 04/25/22 1942   Temp 35.7 °C (96.3 °F) 04/25/22 1942   Pulse 79 04/25/22 1942   Resp 16 04/25/22 1622   SpO2 94 % 04/25/22 1942         Event Time   Out of Recovery 14:00:00         Pain/Beronica Score: Pain Rating Prior to Med Admin: 7 (4/25/2022  4:22 PM)  Pain Rating Post Med Admin: 3 (4/25/2022  5:22 PM)  Beronica Score: 10 (4/25/2022  1:55 PM)

## 2022-04-26 NOTE — PROGRESS NOTES
"[facility]  Orthopedics  Progress Note    Patient Name: Earle Hoff  MRN: [id number]  Admission Date: 4/25/2022  Hospital Length of Stay: 0 days  Attending Provider: Duane Hicks MD  Primary Care Provider: Jeromy Ray MD  Follow-up For: Procedure(s) (LRB):  ROBOTIC ARTHROPLASTY, KNEE, TOTAL (Right)    Post-Operative Day: 1 Day Post-Op  Subjective:     Principal Problem:<principal problem not specified>    Principal Orthopedic Problem: S/P R TKA    Interval History: none    Review of patient's allergies indicates:   Allergen Reactions    Pcn [penicillins]        Current Facility-Administered Medications   Medication    aspirin chewable tablet 81 mg    bisacodyL suppository 10 mg    celecoxib capsule 200 mg    dextrose 10% bolus 125 mL    dextrose 10% bolus 250 mL    famotidine tablet 20 mg    glucagon (human recombinant) injection 1 mg    glucose chewable tablet 16 g    glucose chewable tablet 24 g    insulin aspart U-100 pen 0-5 Units    morphine injection 2 mg    ondansetron disintegrating tablet 8 mg    oxyCODONE immediate release tablet Tab 10 mg    polyethylene glycol packet 17 g    sodium chloride 0.9% flush 10 mL    sodium chloride 0.9% flush 10 mL    sodium chloride 0.9% flush 5 mL    zolpidem tablet 5 mg     Objective:     Vital Signs (Most Recent):  Temp: 97.8 °F (36.6 °C) (04/26/22 0410)  Pulse: 74 (04/26/22 0410)  Resp: 20 (04/26/22 0410)  BP: (!) 140/71 (04/26/22 0410)  SpO2: 98 % (04/26/22 0410)   Vital Signs (24h Range):  Temp:  [96.3 °F (35.7 °C)-98.1 °F (36.7 °C)] 97.8 °F (36.6 °C)  Pulse:  [63-82] 74  Resp:  [6-20] 20  SpO2:  [93 %-98 %] 98 %  BP: (112-142)/(55-80) 140/71     Weight: (!) 137.6 kg (303 lb 7.4 oz)  Height: 5' 9" (175.3 cm)  Body mass index is 44.81 kg/m².      Intake/Output Summary (Last 24 hours) at 4/26/2022 0623  Last data filed at 4/25/2022 1828  Gross per 24 hour   Intake 3030 ml   Output 100 ml   Net 2930 ml "       General    Nursing note and vitals reviewed.  Constitutional: He is oriented to person, place, and time.   Obese   Pulmonary/Chest: Effort normal.   Neurological: He is alert and oriented to person, place, and time.   Psychiatric: He has a normal mood and affect. His behavior is normal.           Right Knee Exam     Comments:  RLE DNVI. Dressings C/D/I    Significant Labs: CBC:   Recent Labs   Lab 04/26/22  0427   WBC 11.32   HGB 11.6*   HCT 35.9*        CMP: No results for input(s): NA, K, CL, CO2, GLU, BUN, CREATININE, CALCIUM, PROT, ALBUMIN, BILITOT, ALKPHOS, AST, ALT, ANIONGAP, EGFRNONAA in the last 48 hours.    Invalid input(s): ESTGFAFRICA  All pertinent labs within the past 24 hours have been reviewed.    Significant Imaging: There has been a cemented tricompartmental right knee arthroplasty.  All hardware components are well seated.  There is no fracture.  Expected postoperative soft tissue gas is noted.    Assessment/Plan:     * Status post total right knee replacement  Cont OOB with PT and nursing  Change dressing prior to DC  DC home today with KENNETH RAE  Orthopedics  [facility]

## 2022-04-26 NOTE — PLAN OF CARE
Problem: Physical Therapy  Goal: Physical Therapy Goal  Description: Goals to be met by: 22    Patient will increase functional independence with mobility by performin. Supine to sit with Supervision  2. Sit to stand transfer with Supervision  3. Bed to chair transfer with Supervision using Rolling Walker  4. Gait  x 250 feet with Supervision using Rolling Walker.   5. Lower extremity exercise program x20 reps per handout, with supervision  Outcome: Ongoing, Progressing

## 2022-04-26 NOTE — ANESTHESIA PROCEDURE NOTES
Peripheral Block    Patient location during procedure: pre-op   Block not for primary anesthetic.  Reason for block: at surgeon's request and post-op pain management   Post-op Pain Location: right knee   Start time: 4/25/2022 9:45 AM  Timeout: 4/25/2022 9:45 AM   End time: 4/25/2022 9:50 AM    Staffing  Authorizing Provider: Paras Boo MD  Performing Provider: Paras Boo MD    Preanesthetic Checklist  Completed: patient identified, IV checked, site marked, risks and benefits discussed, surgical consent, monitors and equipment checked, pre-op evaluation and timeout performed  Peripheral Block  Patient position: supine  Prep: ChloraPrep  Patient monitoring: heart rate, cardiac monitor, continuous pulse ox, continuous capnometry and frequent blood pressure checks  Block type: adductor canal  Laterality: right  Injection technique: single shot  Needle  Needle type: Stimuplex   Needle gauge: 21 G  Needle length: 4 in  Needle localization: anatomical landmarks and ultrasound guidance   -ultrasound image captured on disc.  Assessment  Injection assessment: negative aspiration, negative parasthesia and local visualized surrounding nerve  Paresthesia pain: none  Heart rate change: no  Slow fractionated injection: yes  Pain Tolerance: comfortable throughout block and no complaints  Medications:    Medications: bupivacaine (pf) (MARCAINE) injection 0.5% - Perineural   10 mL - 4/25/2022 9:50:00 AM    Additional Notes  VSS.  [facility] RN monitoring vitals throughout procedure.  Patient tolerated procedure well.

## 2022-04-26 NOTE — PLAN OF CARE
Goals to be met by: 5/24/22    Patient will increase functional independence with ADLs by performing:    UE Dressing with Modified [place].  LE Dressing with Modified [place].  Grooming while standing at sink with Modified [place].  Toileting from bedside commode with Modified [place] for hygiene and clothing management.   Bathing from  shower chair/bench with Modified [place].  Toilet transfer to bedside commode with Modified [place].  Increased strength and functional activity tolerance for ADL's/IADL's as evidenced by requiring less assistance with these tasks.

## 2022-04-26 NOTE — ANESTHESIA PREPROCEDURE EVALUATION
04/25/2022  Earle Hoff is a 62 y.o., male.      Pre-op Assessment    I have reviewed the Patient Summary Reports.     I have reviewed the Nursing Notes. I have reviewed the NPO Status.   I have reviewed the Medications.     Review of Systems  Anesthesia Hx:  No problems with previous Anesthesia    Social:  Smoker    Hematology/Oncology:  Hematology Normal   Oncology Normal     EENT/Dental:EENT/Dental Normal   Cardiovascular:   hyperlipidemia    Pulmonary:   Sleep Apnea    Musculoskeletal:   Arthritis  Osteoarthritis of right knee    Endocrine:   Diabetes  Morbid Obesity / BMI > 40      Physical Exam  General: Well nourished, Cooperative, Alert and Oriented    Airway:  Mallampati: III   Mouth Opening: Small, but > 3cm  TM Distance: Normal  Tongue: Normal  Neck ROM: Normal ROM    Dental:  Intact        Anesthesia Plan  Type of Anesthesia, risks & benefits discussed:    Anesthesia Type: Spinal  Intra-op Monitoring Plan: Standard ASA Monitors  Post Op Pain Control Plan: multimodal analgesia, peripheral nerve block and IV/PO Opioids PRN  Informed Consent: Informed consent signed with the Patient and all parties understand the risks and agree with anesthesia plan.  All questions answered. Patient consented to blood products? Yes  ASA Score: 3  Day of Surgery Review of History & Physical: H&P Update referred to the surgeon/provider.    Ready For Surgery From Anesthesia Perspective.     .

## 2022-04-26 NOTE — PT/OT/SLP PROGRESS
"Physical Therapy Treatment    Patient Name:  Earle Hoff   MRN:  [id number]    Recommendations:     Discharge Recommendations:  home health PT   Discharge Equipment Recommendations: bath bench (has RW and BSC)   Barriers to discharge: None    Assessment:     Earle Hoff is a 62 y.o. male admitted with a medical diagnosis of Status post total right knee replacement.  He presents with the following impairments/functional limitations:  weakness, impaired endurance, impaired self care skills, impaired functional mobilty, gait instability, impaired balance, decreased lower extremity function, decreased safety awareness, pain, decreased ROM, orthopedic precautions. Patient is agreeable to participation with PT treatment. Mild tremors of hands at rest (post-anesthesia?). He denies right knee pain at rest. He requires SBA for supine to sit transfer with use of bed rails and HOB elevated. He requires Johnny for sit to stand transfer with RW stabilized and VC for hand placement. He ambulated 250' with RW, CGA, step to gait pattern, RLE WBAT, VC for sequencing, and patient reporting "snapping" sensation at proximal right knee that he explains feels like a muscle snapping, but does not increase pain. He is agreeable to sit up in chair upon return to room with chair alarm on, spouse present, and RN notified.      Rehab Prognosis: Good; patient would benefit from acute skilled PT services to address these deficits and reach maximum level of function.    Recent Surgery: Procedure(s) (LRB):  ROBOTIC ARTHROPLASTY, KNEE, TOTAL (Right) 1 Day Post-Op    Plan:     During this hospitalization, patient to be seen BID to address the identified rehab impairments via gait training, therapeutic activities, therapeutic exercises and progress toward the following goals:    · Plan of Care Expires:  05/25/22    Subjective     Chief Complaint: snapping of right knee   Patient/Family Comments/goals: home today  Pain/Comfort:  · Pain Rating 1: " "0/10  · Pain Rating 2: 3/10  · Location - Side 2: Right  · Location 2: knee  · Pain Addressed 2: Cessation of Activity      Objective:     Communicated with DHAVLA Virk prior to session.  Patient found HOB elevated with bed alarm, cryotherapy, peripheral IV, SCD upon PT entry to room.     General Precautions: Standard, fall   Orthopedic Precautions:RLE weight bearing as tolerated   Braces: N/A  Respiratory Status: Room air     Functional Mobility:  · Bed Mobility:     · Supine to Sit: stand by assistance  · Transfers:     · Sit to Stand:  minimum assistance with rolling walker  · Gait: 250' with RW, CGA, step to gait pattern, RLE WBAT, VC for sequencing, and patient reporting "snapping" sensation at proximal right knee that he explains feels like a muscle snapping, but does not increase pain.      AM-PAC 6 CLICK MOBILITY  Turning over in bed (including adjusting bedclothes, sheets and blankets)?: 4  Sitting down on and standing up from a chair with arms (e.g., wheelchair, bedside commode, etc.): 3  Moving from lying on back to sitting on the side of the bed?: 4  Moving to and from a bed to a chair (including a wheelchair)?: 3  Need to walk in hospital room?: 4  Climbing 3-5 steps with a railing?: 2  Basic Mobility Total Score: 20       Therapeutic Activities and Exercises:   Patient was educated on the importance of OOB activity and functional mobility to negate negative effects of prolonged bed rest during hospitalization, safe transfers and ambulation, and D/C planning     Patient performed 10 reps of right LE therapeutic exercise including quad sets, straight leg raises, short arc quads, long arc quads, and heel slides      Patient requests to keshav underwear and shorts with assistance from spouse and VC for sequencing    Patient left up in chair with all lines intact, call button in reach, chair alarm on, RN notified and spouse present..    GOALS:   Multidisciplinary Problems     Physical Therapy Goals        " Problem: Physical Therapy    Goal Priority Disciplines Outcome Goal Variances Interventions   Physical Therapy Goal     PT, PT/OT Ongoing, Progressing     Description: Goals to be met by: 22    Patient will increase functional independence with mobility by performin. Supine to sit with Supervision  2. Sit to stand transfer with Supervision  3. Bed to chair transfer with Supervision using Rolling Walker  4. Gait  x 250 feet with Supervision using Rolling Walker.   5. Lower extremity exercise program x20 reps per handout, with supervision                   Time Tracking:     PT Received On: 22  PT Start Time: 853     PT Stop Time: 929  PT Total Time (min): 36 min     Billable Minutes: Gait Training 20 and Therapeutic Exercise 16    Treatment Type: Treatment  PT/PTA: PT     PTA Visit Number: 0     2022

## 2022-04-26 NOTE — PLAN OF CARE
Plan of care reviewed with patient & wife. R. Knee with dressing C/D/I . Cryotherapy in use. Neurovascular checks WNL.medicated for pain once this shift, full relief obtained. Remains free from falls/injury .instructed to call for assistance as needed, verbalized understanding. Call light in reach.

## 2022-04-26 NOTE — HPI
POD #2 S/P R TKA  - Doing well. Minimal pain.  - Did great with PT  - DC cancelled 2/2 JAYLON and glucose

## 2022-04-26 NOTE — ANESTHESIA PROCEDURE NOTES
Spinal    Diagnosis: osteoarthritis  Patient location during procedure: OR  Start time: 4/25/2022 11:00 AM  Timeout: 4/25/2022 11:00 AM  End time: 4/25/2022 11:10 AM    Staffing  Authorizing Provider: Paras Boo MD  Performing Provider: Paras Boo MD    Preanesthetic Checklist  Completed: patient identified, IV checked, site marked, risks and benefits discussed, surgical consent, monitors and equipment checked, pre-op evaluation and timeout performed  Spinal Block  Patient position: sitting  Prep: ChloraPrep  Patient monitoring: heart rate, cardiac monitor, continuous pulse ox, continuous capnometry and frequent blood pressure checks  Approach: right paramedian  Location: L4-5  Injection technique: single shot  CSF Fluid: clear free-flowing CSF  Needle  Needle type: pencil-tip   Needle gauge: 25 G  Needle length: 5 in  Additional Documentation: incremental injection, negative aspiration for heme, no paresthesia on injection and left transient paresthesia  Needle localization: anatomical landmarks  Assessment  Sensory level: T10   Dermatomal levels determined by alcohol wipe  Ease of block: moderate  Patient's tolerance of the procedure: comfortable throughout block and no complaints  Medications:    Medications: bupivacaine (pf) (MARCAINE) injection 0.75% - Intraspinal   1.8 mL - 4/25/2022 11:10:00 AM

## 2022-04-26 NOTE — PROGRESS NOTES
"Daily Progress note  [facility]    Earle Hoff (MRN: [id number])  Admitting Service: Hospital Medicine  Date of Admission: 4/25/2022   Primary Care Provider: Jeromy Ray MD    ?  Chief complaint: Right knee pain   ?  History of Present Illness:     62-year-old male with history of diabetes and venous stasis presents with right knee pain now status post total right knee arthroplasty who will be observed overnight.    The patient reports his knee pain has been worsening over course of months such that is now severe, essentially constant and worsened by minimal movement.  Pain medications over the counter were helpful initially but now provide minimal relief.    Patient was seen after surgery.  No chest pain, shortness or lightheadedness.  Denies nausea or vomiting.  Believes he can tolerate a diet.  Pain is controlled with current regimen.    Subjective:    No chest pain , SOB, lightheadedness. Reduced oral intake last night but now returned to normal. Surgical pain adequately controlled with current regimen ?        PHYSICAL EXAM  Vitals: /60 (Patient Position: Lying)   Pulse 71   Temp 98.6 °F (37 °C) (Oral)   Resp 17   Ht 5' 9" (1.753 m)   Wt (!) 137.6 kg (303 lb 7.4 oz)   SpO2 99%   BMI 44.81 kg/m²  Body mass index is 44.81 kg/m².    General: NAD, AO3, obese  HEENT: PERRL, EOMI.   Cardiovascular: RRR  Respiratory: lungs CTAB  GI: abdomen S/NT/ND, +BS  Extremities: no edema   MSK: moves upper extremities. Right knee bandaged    Neuro/Psych: A&OX3. CNII-XII grossly intact.      EKG and radiographs from the past 24h: reviewed and personally interpreted if available.      LABS    Recent Labs     04/26/22 0427   WBC 11.32   HGB 11.6*        ?  Recent Labs     04/26/22 0427   *   K 5.1   CO2 20*   BUN 21   CREATININE 1.5*   MG 2.1     ?  No results for input(s): BILITOT, AST, ALT, ALKPHOS, PROT, ALB in the last 72 hours.  ?  No results for input(s): INR, PT, PTT in the last " 72 hours.  ?    ASSESSMENT & PLAN    62-year-old male with history of diabetes and venous stasis presents with right knee pain now status post total right knee arthroplasty who will be observed overnight.    Course complicated by JAYLON with hyponatremia 2/2 poor oral intake in addition to hyperglycemia    0. Acute kidney injury, non-traumatic  -2/2 hypovolemia  -ivf  -oral intake improving.   -follow BMP    1. Status post total right knee arthroplasty, right knee osteoarthritis   -pain control, physical therapy/occupational therapy  -periprocedural antibiotics per surgery  -DVT prophylaxis: ASA 81 bid.    2. Diabetes  -hold metformin  -insulin sliding scale  -5 IV insulin and fluids on 4/26; hyperglycemic in setting of JAYLON and hypovolemia     3. Venous stasis  -hold Lasix; he reports he takes it once a week and has never had respiratory symptoms from being volume overloaded      Code Status/Dispo: Prior.   ?  Duane Hicks    Date: 4/26/2022  Time: 3:26 PM

## 2022-04-27 LAB
ANION GAP SERPL CALC-SCNC: 10 MMOL/L (ref 8–16)
BUN SERPL-MCNC: 21 MG/DL (ref 8–23)
CALCIUM SERPL-MCNC: 8.4 MG/DL (ref 8.7–10.5)
CHLORIDE SERPL-SCNC: 109 MMOL/L (ref 95–110)
CO2 SERPL-SCNC: 21 MMOL/L (ref 23–29)
CREAT SERPL-MCNC: 1.2 MG/DL (ref 0.5–1.4)
EST. GFR  (AFRICAN AMERICAN): >60 ML/MIN/1.73 M^2
EST. GFR  (NON AFRICAN AMERICAN): >60 ML/MIN/1.73 M^2
GLUCOSE SERPL-MCNC: 150 MG/DL (ref 70–110)
MAGNESIUM SERPL-MCNC: 2.1 MG/DL (ref 1.6–2.6)
POCT GLUCOSE: 148 MG/DL (ref 70–110)
POCT GLUCOSE: 195 MG/DL (ref 70–110)
POTASSIUM SERPL-SCNC: 4.5 MMOL/L (ref 3.5–5.1)
SODIUM SERPL-SCNC: 140 MMOL/L (ref 136–145)

## 2022-04-27 PROCEDURE — 83735 ASSAY OF MAGNESIUM: CPT | Performed by: INTERNAL MEDICINE

## 2022-04-27 PROCEDURE — 94799 UNLISTED PULMONARY SVC/PX: CPT

## 2022-04-27 PROCEDURE — G0378 HOSPITAL OBSERVATION PER HR: HCPCS

## 2022-04-27 PROCEDURE — 96361 HYDRATE IV INFUSION ADD-ON: CPT

## 2022-04-27 PROCEDURE — 94761 N-INVAS EAR/PLS OXIMETRY MLT: CPT

## 2022-04-27 PROCEDURE — 97110 THERAPEUTIC EXERCISES: CPT | Mod: CQ,97

## 2022-04-27 PROCEDURE — 99406 BEHAV CHNG SMOKING 3-10 MIN: CPT

## 2022-04-27 PROCEDURE — 97116 GAIT TRAINING THERAPY: CPT | Mod: CQ,97

## 2022-04-27 PROCEDURE — 36415 COLL VENOUS BLD VENIPUNCTURE: CPT | Performed by: INTERNAL MEDICINE

## 2022-04-27 PROCEDURE — 80048 BASIC METABOLIC PNL TOTAL CA: CPT | Performed by: INTERNAL MEDICINE

## 2022-04-27 PROCEDURE — 25000003 PHARM REV CODE 250: Performed by: ORTHOPAEDIC SURGERY

## 2022-04-27 RX ORDER — FUROSEMIDE 20 MG/1
20 TABLET ORAL DAILY PRN
Start: 2022-04-27

## 2022-04-27 RX ORDER — NAPROXEN SODIUM 220 MG/1
81 TABLET, FILM COATED ORAL 2 TIMES DAILY
Qty: 42 TABLET | Refills: 0 | Status: SHIPPED | OUTPATIENT
Start: 2022-04-27 | End: 2022-06-07

## 2022-04-27 RX ADMIN — CELECOXIB 200 MG: 100 CAPSULE ORAL at 08:04

## 2022-04-27 RX ADMIN — POLYETHYLENE GLYCOL 3350 17 G: 17 POWDER, FOR SOLUTION ORAL at 08:04

## 2022-04-27 RX ADMIN — OXYCODONE HYDROCHLORIDE 10 MG: 10 TABLET ORAL at 05:04

## 2022-04-27 RX ADMIN — FAMOTIDINE 20 MG: 20 TABLET ORAL at 08:04

## 2022-04-27 RX ADMIN — ASPIRIN 81 MG CHEWABLE TABLET 81 MG: 81 TABLET CHEWABLE at 08:04

## 2022-04-27 RX ADMIN — OXYCODONE HYDROCHLORIDE 10 MG: 10 TABLET ORAL at 09:04

## 2022-04-27 NOTE — PLAN OF CARE
Problem: Physical Therapy  Goal: Physical Therapy Goal  Description: Goals to be met by: 22    Patient will increase functional independence with mobility by performin. Supine to sit with Supervision  2. Sit to stand transfer with Supervision  3. Bed to chair transfer with Supervision using Rolling Walker  4. Gait  x 250 feet with Supervision using Rolling Walker.   5. Lower extremity exercise program x20 reps per handout, with supervision  Outcome: Ongoing, Progressing   Ambulate with rw and assistance for safety.

## 2022-04-27 NOTE — PT/OT/SLP PROGRESS
Occupational Therapy      Patient Name:  Earle Hoff   MRN:  [id number]    Patient not seen today secondary to Other (Comment) (Pt dressed and ready to go home. Pt has already completed grooming/hygiene and toileting. Pt declined, plans to participate in HHOT.).    4/27/2022

## 2022-04-27 NOTE — ASSESSMENT & PLAN NOTE
Labs improved.    Cont OOB with PT and nursing  Change dressing prior to DC  DC home today with HH

## 2022-04-27 NOTE — SUBJECTIVE & OBJECTIVE
"Principal Problem:Status post total right knee replacement    Principal Orthopedic Problem: S/P R TKA    Interval History: elevated glucose and JAYLON - labs improved this am    Review of patient's allergies indicates:   Allergen Reactions    Pcn [penicillins]        Current Facility-Administered Medications   Medication    0.9%  NaCl infusion    aspirin chewable tablet 81 mg    bisacodyL suppository 10 mg    celecoxib capsule 200 mg    dextrose 10% bolus 125 mL    dextrose 10% bolus 250 mL    famotidine tablet 20 mg    glucagon (human recombinant) injection 1 mg    glucose chewable tablet 16 g    glucose chewable tablet 24 g    insulin aspart U-100 pen 0-5 Units    insulin detemir U-100 pen 5 Units    morphine injection 2 mg    ondansetron disintegrating tablet 8 mg    oxyCODONE immediate release tablet Tab 10 mg    polyethylene glycol packet 17 g    sodium chloride 0.9% flush 10 mL    sodium chloride 0.9% flush 10 mL    sodium chloride 0.9% flush 5 mL    zolpidem tablet 5 mg     Objective:     Vital Signs (Most Recent):  Temp: 97 °F (36.1 °C) (04/27/22 0332)  Pulse: 60 (04/27/22 0332)  Resp: 18 (04/27/22 0507)  BP: 113/69 (04/27/22 0332)  SpO2: 98 % (04/27/22 0332) Vital Signs (24h Range):  Temp:  [96.4 °F (35.8 °C)-98.7 °F (37.1 °C)] 97 °F (36.1 °C)  Pulse:  [60-77] 60  Resp:  [16-18] 18  SpO2:  [96 %-100 %] 98 %  BP: (113-135)/(58-75) 113/69     Weight: (!) 137.6 kg (303 lb 7.4 oz)  Height: 5' 9" (175.3 cm)  Body mass index is 44.81 kg/m².    No intake or output data in the 24 hours ending 04/27/22 0702    General    Nursing note and vitals reviewed.  Constitutional: He is oriented to person, place, and time.   Morbidly obese. No pain   Pulmonary/Chest: Effort normal.   Neurological: He is alert and oriented to person, place, and time.   Psychiatric: He has a normal mood and affect. His behavior is normal.           Right Knee Exam     Comments:  RLE DNVI. Incision/ dressing C/D/I    Significant Labs: CBC:   Recent " Labs   Lab 04/26/22 0427   WBC 11.32   HGB 11.6*   HCT 35.9*        CMP:   Recent Labs   Lab 04/26/22  0427 04/26/22  1658 04/27/22  0433   * 138 140   K 5.1 4.6 4.5    105 109   CO2 20* 23 21*   * 263* 150*   BUN 21 20 21   CREATININE 1.5* 1.5* 1.2   CALCIUM 8.8 9.0 8.4*   ANIONGAP 12 10 10   EGFRNONAA 49* 49* >60     All pertinent labs within the past 24 hours have been reviewed.    Significant Imaging: None

## 2022-04-27 NOTE — PROGRESS NOTES
"[facility]  Orthopedics  Progress Note    Patient Name: Earle Hoff  MRN: [id number]  Admission Date: 4/25/2022  Hospital Length of Stay: 0 days  Attending Provider: Duane Hicks MD  Primary Care Provider: Jeromy Ray MD  Follow-up For: Procedure(s) (LRB):  ROBOTIC ARTHROPLASTY, KNEE, TOTAL (Right)    Post-Operative Day: 2 Days Post-Op  Subjective:     Principal Problem:Status post total right knee replacement    Principal Orthopedic Problem: S/P R TKA    Interval History: elevated glucose and JAYLON - labs improved this am    Review of patient's allergies indicates:   Allergen Reactions    Pcn [penicillins]        Current Facility-Administered Medications   Medication    0.9%  NaCl infusion    aspirin chewable tablet 81 mg    bisacodyL suppository 10 mg    celecoxib capsule 200 mg    dextrose 10% bolus 125 mL    dextrose 10% bolus 250 mL    famotidine tablet 20 mg    glucagon (human recombinant) injection 1 mg    glucose chewable tablet 16 g    glucose chewable tablet 24 g    insulin aspart U-100 pen 0-5 Units    insulin detemir U-100 pen 5 Units    morphine injection 2 mg    ondansetron disintegrating tablet 8 mg    oxyCODONE immediate release tablet Tab 10 mg    polyethylene glycol packet 17 g    sodium chloride 0.9% flush 10 mL    sodium chloride 0.9% flush 10 mL    sodium chloride 0.9% flush 5 mL    zolpidem tablet 5 mg     Objective:     Vital Signs (Most Recent):  Temp: 97 °F (36.1 °C) (04/27/22 0332)  Pulse: 60 (04/27/22 0332)  Resp: 18 (04/27/22 0507)  BP: 113/69 (04/27/22 0332)  SpO2: 98 % (04/27/22 0332) Vital Signs (24h Range):  Temp:  [96.4 °F (35.8 °C)-98.7 °F (37.1 °C)] 97 °F (36.1 °C)  Pulse:  [60-77] 60  Resp:  [16-18] 18  SpO2:  [96 %-100 %] 98 %  BP: (113-135)/(58-75) 113/69     Weight: (!) 137.6 kg (303 lb 7.4 oz)  Height: 5' 9" (175.3 cm)  Body mass index is 44.81 kg/m².    No intake or output data in the 24 hours ending 04/27/22 " 0702    General    Nursing note and vitals reviewed.  Constitutional: He is oriented to person, place, and time.   Morbidly obese. No pain   Pulmonary/Chest: Effort normal.   Neurological: He is alert and oriented to person, place, and time.   Psychiatric: He has a normal mood and affect. His behavior is normal.           Right Knee Exam     Comments:  RLE DNVI. Incision/ dressing C/D/I    Significant Labs: CBC:   Recent Labs   Lab 04/26/22  0427   WBC 11.32   HGB 11.6*   HCT 35.9*        CMP:   Recent Labs   Lab 04/26/22  0427 04/26/22  1658 04/27/22  0433   * 138 140   K 5.1 4.6 4.5    105 109   CO2 20* 23 21*   * 263* 150*   BUN 21 20 21   CREATININE 1.5* 1.5* 1.2   CALCIUM 8.8 9.0 8.4*   ANIONGAP 12 10 10   EGFRNONAA 49* 49* >60     All pertinent labs within the past 24 hours have been reviewed.    Significant Imaging: None    Assessment/Plan:     * Status post total right knee replacement  Labs improved.    Cont OOB with PT and nursing  Change dressing prior to DC  DC home today with KENNETH RAE  Orthopedics  [facility] [place]

## 2022-04-27 NOTE — PLAN OF CARE
Pt cleared for Discharge by Case Management.    PCP Appt scheduled and added to AVS.    HH orders sent to [facility]    No other CM needs noted.         04/27/22 1110   Final Note   Assessment Type Final Discharge Note   Anticipated Discharge Disposition Home-Health   What phone number can be called within the next 1-3 days to see how you are doing after discharge? [phone]   Hospital Resources/Appts/Education Provided Appointments scheduled and added to AVS   Post-Acute Status   Post-Acute Authorization Home Health   Home Health Status Set-up Complete/Auth obtained   Discharge Delays None known at this time

## 2022-04-27 NOTE — CARE UPDATE
04/27/22 0800   Tobacco Cessation Intervention   Do you use any type of tobacco product? Yes   Are you interested in quitting use of tobacco products? Not interested   Are you interested in Nicotine Replacement for symptom relief? No   $ Smoking Cessation Charges Smoking Cessation - Intermediate (CTTS)

## 2022-04-27 NOTE — PT/OT/SLP PROGRESS
Physical Therapy Treatment    Patient Name:  Earle Hoff   MRN:  [id number]    Recommendations:     Discharge Recommendations:  home health PT   Discharge Equipment Recommendations: none   Barriers to discharge: None    Assessment:     Earle Hoff is a 62 y.o. male admitted with a medical diagnosis of Status post total right knee replacement.  He presents with the following impairments/functional limitations:  weakness, impaired endurance, impaired self care skills, impaired functional mobilty, gait instability, visual deficits, decreased lower extremity function, pain, orthopedic precautions . Seated in chair at bedside, spouse present. Reports anticipating discharge.  Sit > stand with Min A.  Ambulated 100' + 150' with rw and CGS, WBAT RLE with seated rest between each. Requested pain medicine following session, nurse Turpin informed.     Rehab Prognosis: Good; patient would benefit from acute skilled PT services to address these deficits and reach maximum level of function.    Recent Surgery: Procedure(s) (LRB):  ROBOTIC ARTHROPLASTY, KNEE, TOTAL (Right) 2 Days Post-Op    Plan:     During this hospitalization, patient to be seen BID to address the identified rehab impairments via gait training, therapeutic activities, therapeutic exercises and progress toward the following goals:    · Plan of Care Expires:  05/25/22    Subjective     Chief Complaint: Pain medial R knee like a spasm  Patient/Family Comments/goals: to return home.  Pain/Comfort:  · Pain Rating 1: 8/10  · Location - Side 1: Right  · Location - Orientation 1: medial  · Location 1: knee  · Pain Addressed 1: Reposition, Nurse notified      Objective:     Communicated with nurse Turpin prior to session.  Patient found up in chair with peripheral IV, cryotherapy upon PT entry to room.     General Precautions: Standard, fall   Orthopedic Precautions:RLE weight bearing as tolerated   Braces: N/A  Respiratory Status: Room air     Functional  Mobility:  · Transfers:     · Sit to Stand:  minimum assistance with rolling walker  · Gait: 100' + 150' with rw and CGA, WBAT RLE , seated rest between each.       AM-PAC 6 CLICK MOBILITY          Therapeutic Activities and Exercises:   Ambulated with rw and CGA.   BLE exercises: SLR's, HS, QS, AP's.     Patient left up in chair with all lines intact, call button in reach, nurse Papi notified and spouse present..    GOALS:   Multidisciplinary Problems     Physical Therapy Goals        Problem: Physical Therapy    Goal Priority Disciplines Outcome Goal Variances Interventions   Physical Therapy Goal     PT, PT/OT Ongoing, Progressing     Description: Goals to be met by: 22    Patient will increase functional independence with mobility by performin. Supine to sit with Supervision  2. Sit to stand transfer with Supervision  3. Bed to chair transfer with Supervision using Rolling Walker  4. Gait  x 250 feet with Supervision using Rolling Walker.   5. Lower extremity exercise program x20 reps per handout, with supervision                   Time Tracking:     PT Received On: 22  PT Start Time: 907     PT Stop Time: 930  PT Total Time (min): 23 min     Billable Minutes: Gait Training 13min and Therapeutic Exercise 10min       PT/PTA: PTA     PTA Visit Number: 1     2022

## 2022-04-27 NOTE — DISCHARGE SUMMARY
[facility] Hospital Medicine  Discharge Summary      Patient Name: Earle Hoff  MRN: [id number]  Admission Date: 4/25/2022  Hospital Length of Stay: 0 days  Discharge Date and Time:  04/27/2022 10:04 AM  Attending Physician: Duane Hicks MD   Discharging Provider: Duane Hicks MD  Primary Care Provider: Jeromy Ray MD        HPI:     62-year-old male with history of diabetes and venous stasis presents with right knee pain now status post total right knee arthroplasty who will be observed overnight.     The patient reports his knee pain has been worsening over course of months such that is now severe, essentially constant and worsened by minimal movement.  Pain medications over the counter were helpful initially but now provide minimal relief.     Patient was seen after surgery.  No chest pain, shortness or lightheadedness.  Denies nausea or vomiting.  Believes he can tolerate a diet.  Pain is controlled with current regimen.    Procedure(s) (LRB):  ROBOTIC ARTHROPLASTY, KNEE, TOTAL (Right)      Hospital Course:     The patient was admitted for right knee replacement.  This occurred without complication.  The patient's pain was controlled on oral pain medications and patient was able to successfully work with physical therapy.  He will be discharged on aspirin 81 twice a day for 21 days for DVT prophylaxis and will follow with Orthopedic surgery as instructed by that service.    Course was complicated by an JAYLON in the setting of hypovolemia after being NPO the day prior.  Fluids were administered and this resolved.    Patient also developed hyperglycemia which was managed with insulin and fluids.  He reports that his sugars are well controlled at home on his home regimen and as such his home regimen will not be changed.  Follow with PCP for further titration as required in a week.      0. Acute kidney injury, non-traumatic  -2/2 hypovolemia  -ivf  -oral intake normalized.    -resolved        1. Status post total right knee arthroplasty, right knee osteoarthritis   -pain control, physical therapy/occupational therapy  -periprocedural antibiotics per surgery  -DVT prophylaxis: ASA 81 bid.     2. Diabetes  -as above    3. Venous stasis  -hold Lasix for 5 days following discharge or contact PCP if leg swelling worsens before then             Consults:     Final Active Diagnoses:    Diagnosis Date Noted POA    PRINCIPAL PROBLEM:  Status post total right knee replacement [Z96.651] 04/26/2022 Not Applicable      Problems Resolved During this Admission:      Discharged Condition: stable    Disposition: Home-Health Care [facility]    Follow Up:   Follow-up Information     [facility] Follow up.    Contact information:  [address] [place] [id number]  [phone]           Adama Howard PA-C. Go on 5/9/2022.    Specialty: Orthopedic Surgery  Why: POST OP APPOINTMENT-5/9/2022 at 9:45a  Contact information:  [address]  [address]  [facility]  [facility] [address]  [phone]                       Patient Instructions:      Diet diabetic     Notify your health care provider if you experience any of the following:  temperature >100.4     Notify your health care provider if you experience any of the following:  persistent nausea and vomiting or diarrhea     Notify your health care provider if you experience any of the following:  severe uncontrolled pain     Notify your health care provider if you experience any of the following:  difficulty breathing or increased cough     Notify your health care provider if you experience any of the following:  severe persistent headache     Notify your health care provider if you experience any of the following:  worsening rash     Notify your health care provider if you experience any of the following:  persistent dizziness, light-headedness, or visual disturbances     Notify your health care provider if you  experience any of the following:  increased confusion or weakness     Notify your health care provider if you experience any of the following:     Activity as tolerated     Medications:  Reconciled Home Medications:      Medication List      START taking these medications    oxyCODONE-acetaminophen 7.5-325 mg per tablet  Commonly known as: PERCOCET  Take 1 tablet by mouth every 4 (four) hours as needed for Pain.        CHANGE how you take these medications    aspirin 81 MG Chew  Take 1 tablet (81 mg total) by mouth 2 (two) times a day. for 21 days  What changed: when to take this     furosemide 20 MG tablet  Commonly known as: LASIX  Take 1 tablet (20 mg total) by mouth daily as needed (Leg swelling).  What changed:   · how much to take  · how to take this  · when to take this  · reasons to take this        CONTINUE taking these medications    atorvastatin 20 MG tablet  Commonly known as: LIPITOR  20 mg.     EUCRISA 2 % Oint  Generic drug: crisaborole  Apply 8 g topically once daily.     folic acid 1 MG tablet  Commonly known as: FOLVITE     hydrOXYzine HCL 25 MG tablet  Commonly known as: ATARAX     metFORMIN 1000 MG tablet  Commonly known as: GLUCOPHAGE  Take 1,000 mg by mouth once.     pregabalin 75 MG capsule  Commonly known as: LYRICA  Take 1 capsule (75 mg total) by mouth 3 (three) times daily.     traMADoL 50 mg tablet  Commonly known as: ULTRAM  Take 50 mg by mouth every 6 (six) hours.     TRUE METRIX GLUCOSE TEST STRIP Strp  Generic drug: blood sugar diagnostic     TRUEPLUS LANCETS 33 gauge Misc  Generic drug: lancets                Pending Diagnostic Studies:     None        Indwelling Lines/Drains at time of discharge:   Lines/Drains/Airways     None                 Time spent on the discharge of patient: 35 minutes         Duane Hicks MD  Department of Hospital Medicine  [facility]

## 2022-04-28 ENCOUNTER — TELEPHONE (OUTPATIENT)
Dept: MEDSURG UNIT | Facility: HOSPITAL | Age: 62
End: 2022-04-28
Payer: MEDICARE

## 2022-04-28 VITALS
OXYGEN SATURATION: 98 % | HEART RATE: 78 BPM | WEIGHT: 303.44 LBS | TEMPERATURE: 99 F | HEIGHT: 69 IN | RESPIRATION RATE: 18 BRPM | SYSTOLIC BLOOD PRESSURE: 121 MMHG | BODY MASS INDEX: 44.94 KG/M2 | DIASTOLIC BLOOD PRESSURE: 58 MMHG

## 2022-04-28 NOTE — PROGRESS NOTES
"Progressing very well.  Nerve block has worn off and pain has increased but pain med "takes the edge off"  still using ice.  No s/s infection.  Advised on daily laxative as he reports no BM yet.  States nursing care on the floor was "awesome"  best hospital stay he's ever had!  "

## 2022-05-09 ENCOUNTER — OFFICE VISIT (OUTPATIENT)
Dept: ORTHOPEDICS | Facility: CLINIC | Age: 62
End: 2022-05-09
Payer: COMMERCIAL

## 2022-05-09 VITALS — BODY MASS INDEX: 44.88 KG/M2 | WEIGHT: 303 LBS | HEIGHT: 69 IN

## 2022-05-09 DIAGNOSIS — Z96.651 S/P TOTAL KNEE ARTHROPLASTY, RIGHT: Primary | ICD-10-CM

## 2022-05-09 PROCEDURE — 1159F MED LIST DOCD IN RCRD: CPT | Mod: CPTII,S$GLB,, | Performed by: PHYSICIAN ASSISTANT

## 2022-05-09 PROCEDURE — 99024 POSTOP FOLLOW-UP VISIT: CPT | Mod: S$GLB,,, | Performed by: PHYSICIAN ASSISTANT

## 2022-05-09 PROCEDURE — 1160F RVW MEDS BY RX/DR IN RCRD: CPT | Mod: CPTII,S$GLB,, | Performed by: PHYSICIAN ASSISTANT

## 2022-05-09 PROCEDURE — 1160F PR REVIEW ALL MEDS BY PRESCRIBER/CLIN PHARMACIST DOCUMENTED: ICD-10-PCS | Mod: CPTII,S$GLB,, | Performed by: PHYSICIAN ASSISTANT

## 2022-05-09 PROCEDURE — 1159F PR MEDICATION LIST DOCUMENTED IN MEDICAL RECORD: ICD-10-PCS | Mod: CPTII,S$GLB,, | Performed by: PHYSICIAN ASSISTANT

## 2022-05-09 PROCEDURE — 3008F BODY MASS INDEX DOCD: CPT | Mod: CPTII,S$GLB,, | Performed by: PHYSICIAN ASSISTANT

## 2022-05-09 PROCEDURE — 3008F PR BODY MASS INDEX (BMI) DOCUMENTED: ICD-10-PCS | Mod: CPTII,S$GLB,, | Performed by: PHYSICIAN ASSISTANT

## 2022-05-09 PROCEDURE — 99024 PR POST-OP FOLLOW-UP VISIT: ICD-10-PCS | Mod: S$GLB,,, | Performed by: PHYSICIAN ASSISTANT

## 2022-05-09 RX ORDER — HYDROCODONE BITARTRATE AND ACETAMINOPHEN 7.5; 325 MG/1; MG/1
1 TABLET ORAL EVERY 6 HOURS PRN
Qty: 28 TABLET | Refills: 0 | Status: SHIPPED | OUTPATIENT
Start: 2022-05-09 | End: 2022-05-11

## 2022-05-09 NOTE — PROGRESS NOTES
[facility] ORTHOPEDICS  [address]  [place] [address]  Phone: [phone]   [phone]    Patient's PCP:Jeromy Ray MD  Referring Provider: No ref. provider found    POST-OP Note:    Subjective:        Chief Complaint:   Chief Complaint   Patient presents with    Right Knee - Post-op Evaluation     Right TKA 04/25/2022, patient is doing ok, most of his pain started when he stops moving, a little swollen.       Past Medical History:   Diagnosis Date    Arthritis     Diabetes mellitus     Edema, lower extremity     Gout     Hyperlipidemia     Stasis ulcer of lower extremity, left        Past Surgical History:   Procedure Laterality Date    KNEE ARTHROSCOPY W/ MENISCAL REPAIR      left knee meniscus repair      left shoulder rotator cuff repair      ROBOTIC ARTHROPLASTY, KNEE Right 4/25/2022    Procedure: ROBOTIC ARTHROPLASTY, KNEE, TOTAL;  Surgeon: Raghav Ortiz MD;  Location: [facility];  Service: Orthopedics;  Laterality: Right;    SHOULDER ARTHROSCOPY      TONSILLECTOMY         Current Outpatient Medications   Medication Sig    aspirin 81 MG Chew Take 1 tablet (81 mg total) by mouth 2 (two) times a day. for 21 days    atorvastatin (LIPITOR) 20 MG tablet 20 mg.    EUCRISA 2 % Oint Apply 8 g topically once daily.    folic acid (FOLVITE) 1 MG tablet     furosemide (LASIX) 20 MG tablet Take 1 tablet (20 mg total) by mouth daily as needed (Leg swelling).    hydrOXYzine HCl (ATARAX) 25 MG tablet     metFORMIN (GLUCOPHAGE) 1000 MG tablet Take 1,000 mg by mouth once.    pregabalin (LYRICA) 75 MG capsule Take 1 capsule (75 mg total) by mouth 3 (three) times daily.    traMADoL (ULTRAM) 50 mg tablet Take 50 mg by mouth every 6 (six) hours.    TRUE METRIX GLUCOSE TEST STRIP Strp     TRUEPLUS LANCETS 33 gauge Misc     HYDROcodone-acetaminophen (NORCO) 7.5-325 mg per tablet Take 1 tablet by mouth every 6 (six) hours as needed for Pain.     No current facility-administered  medications for this visit.       Review of patient's allergies indicates:   Allergen Reactions    Pcn [penicillins]        Family History   Problem Relation Age of Onset    No Known Problems Mother     Diabetes Mellitus Father     No Known Problems Sister     No Known Problems Brother     No Known Problems Daughter     No Known Problems Son     No Known Problems Maternal Aunt     No Known Problems Maternal Uncle     No Known Problems Paternal Aunt     No Known Problems Paternal Uncle     No Known Problems Maternal Grandmother     No Known Problems Maternal Grandfather     No Known Problems Paternal Grandmother     No Known Problems Paternal Grandfather        Social History     Socioeconomic History    Marital status:    Tobacco Use    Smoking status: Current Some Day Smoker     Packs/day: 0.50     Years: 40.00     Pack years: 20.00     Types: Cigarettes     Last attempt to quit: 12/31/2018     Years since quitting: 3.3    Smokeless tobacco: Never Used   Substance and Sexual Activity    Alcohol use: No    Drug use: No    Sexual activity: Yes       History of present illness:  Mr. Og comes in today 2 weeks status post right total knee arthroplasty.  He is here today for wound check and suture removal.  He presents to the clinic today weightbearing as tolerated right lower extremity ambulating with a rolling walker.  He does report that the oxycodone is not as efficacious as he would like for him.  He would like to try a different medication.    Review of Systems:    Musculoskeletal:  See HPI       Objective:        Physical Examination:    Vital Signs: There were no vitals filed for this visit.    Body mass index is 44.75 kg/m².    This a well-developed, well nourished patient in no acute distress.  They are alert and oriented and cooperative to examination.        Right knee exam:  Skin right knee is clean dry and intact.  Right knee anterior midline incision is healing well without  wound dehiscence or drainage.  There is no erythema or ecchymosis.  There are no signs or symptoms of infection.  Right calf is soft and nontender.  Right knee active range of motion is approximately 0-90 degrees.  Right knee is stable to varus and valgus stresses.  He can weightbear as tolerated right lower extremity and ambulates with a rolling walker.    Pertinent New Results:     XRAY Report / Interpretation:  No new radiographs were taken on today's clinic visit.       Assessment:       1. S/P total knee arthroplasty, right      Plan:     S/P total knee arthroplasty, right  -     Ambulatory referral/consult to Physical/Occupational Therapy; Future; Expected date: 05/16/2022  -     HYDROcodone-acetaminophen (NORCO) 7.5-325 mg per tablet; Take 1 tablet by mouth every 6 (six) hours as needed for Pain.  Dispense: 28 tablet; Refill: 0        Follow up in about 4 weeks (around 6/6/2022) for PO //Xray// Right TKA 04/25/2022, PT tues/thurs.    Sutures removed from his right knee today.  He tolerated this well.  He was advised to clean operative site once a day with warm soapy water not apply any topical creams or ointments.  He is instructed not to submerge operative site underwater in a pool or bathtub type environment for least 1 more week.  He was advised to continue his baby aspirin by mouth twice a day for least 2 more weeks to provide a full 30 days of postoperative DVT prophylaxis coverage.  We will give him a prescription for outpatient physical therapy to begin once home health is complete to continue with range of motion and strengthening exercises to his right lower extremity.  I did transition him from Percocet to Norco 7.5 mg with instructions to take 1 tablet by mouth every 6 hours as needed for pain.  I prescribed quantity 28.  Will have him back in 1 month with x-rays of the right knee and to see how he is progressing with his therapy.      Adama Howard, JOSÉ MIGUELS, PA-C    This note was created using  MModal voice recognition software that occasionally misinterprets words or phrases.

## 2022-05-11 ENCOUNTER — TELEPHONE (OUTPATIENT)
Dept: ORTHOPEDICS | Facility: CLINIC | Age: 62
End: 2022-05-11

## 2022-05-11 DIAGNOSIS — Z96.651 S/P TOTAL KNEE ARTHROPLASTY, RIGHT: Primary | ICD-10-CM

## 2022-05-11 RX ORDER — TRAMADOL HYDROCHLORIDE 50 MG/1
50 TABLET ORAL EVERY 6 HOURS PRN
Qty: 28 TABLET | Refills: 0 | Status: SHIPPED | OUTPATIENT
Start: 2022-05-11 | End: 2022-07-15

## 2022-05-11 NOTE — TELEPHONE ENCOUNTER
Patient is 2 weeks status post right total knee arthroplasty.  He was not having much relief with the initial postop prescription of Percocet 7.5 mg.  Therefore, at his initial follow-up visit we transitioned him to Norco 7.5 mg.  He calls to the clinic today stating that that is not efficacious either for his pain control.  He requests tramadol.  I will send a prescription for tramadol 50 mg with instructions to take 1 tab by mouth every 6 hours as needed for pain.  I prescribed quantity 28.  Hopefully this will be efficacious in controlling his postoperative pain.    ----- Message from William Nicole sent at 5/11/2022 10:03 AM CDT -----  Phone #: [phone]  Patient is requesting a refill of Tramadol            Pharmacy:   [facility] [address] [place] [address] [address] [address]  Phone: [phone] Fax: [phone]                Pt states the  [place] 7.5-325 is  not  working. And  would  like  to try tramadol or  something  stronger.

## 2022-05-17 ENCOUNTER — CLINICAL SUPPORT (OUTPATIENT)
Dept: REHABILITATION | Facility: HOSPITAL | Age: 62
End: 2022-05-17
Payer: COMMERCIAL

## 2022-05-17 DIAGNOSIS — Z96.651 S/P TOTAL KNEE ARTHROPLASTY, RIGHT: ICD-10-CM

## 2022-05-17 DIAGNOSIS — R26.2 DIFFICULTY WALKING: ICD-10-CM

## 2022-05-17 PROCEDURE — 97110 THERAPEUTIC EXERCISES: CPT

## 2022-05-17 PROCEDURE — 97161 PT EVAL LOW COMPLEX 20 MIN: CPT

## 2022-05-17 NOTE — PLAN OF CARE
[facility]PATIENT [facility]ERAPY AND WELLNESS  PHYSICAL THERAPY INITIAL EVALUATION    Name: Earle Hoff  Clinic Number: [id number]    Evaluation Date: 5/17/2022  Visit #: 1 / 12  Authorization period Expiration: 08/17/22  Plan of Care Expiration: 08/17/22    Diagnosis:   Encounter Diagnosis   Name Primary?    S/P total knee arthroplasty, right      Physician: Adama Howard, *  Treatment Orders: PT Eval and Treat  Past Medical History:   Diagnosis Date    Arthritis     Diabetes mellitus     Edema, lower extremity     Gout     Hyperlipidemia     Stasis ulcer of lower extremity, left      has a current medication list which includes the following prescription(s): aspirin, atorvastatin, eucrisa, folic acid, furosemide, hydroxyzine hcl, metformin, pregabalin, tramadol, true metrix glucose test strip, and trueplus lancets.  Review of patient's allergies indicates:   Allergen Reactions    Pcn [penicillins]        History   Prior Therapy: Home health  Social History: single story home with no steps   Previous functional status: ind with amb and adls without assistive device   Current functional status: retired  Work: none    Subjective   History of Present Illness: chronic OA Right knee  DOI: 04/25/22  Imaging, none: na  Pain: current 7/10, worst 10/10, best 4/10, Aching, constant  Radicular symptoms: none  Aggravating factors: prolonged standing and knee flexion   Easing factors: rest and ice, pain meds  Precautions: none  Pts goals: resume normal activities     Objective   Mental status: alert  Posture/ Alignment: Good    GAIT DEVIATIONS: Earle amb with decreased nanette, decreased step length, decreased stride length, increased stride width and decreased swing-to-stance ratio.    ROM: R knee -5 to 90 def flexion   Strength: 3/5  Special Tests:  none  Palpation:  TTP  Lateral incision    Joint Play:  na    Pt/family was provided educational information, including: role of PT, goals for PT, scheduling - pt  verbalized understanding. Discussed insurance plan with pt.     Functional Limitations Reporting     Category: mobility        TREATMENT   Time In: 9:25 AM  Time Out: 10:15 am     Discussed Plan of Care with patient: Yes    Earle received 25 minutes of therapeutic exercises including:   SLR x 15  Quad sets x 30  Heel slides on SB x 30  SAQ on bolster x 50  Sit to stand x 10 with hands   Static knee extension on bolster  PROM flexion seated         Written Home Exercises Provided: yes  Exercises were reviewed and Earle was able to demonstrate them prior to the end of the session. Pt received a written copy of exercises to perform at home. Earle demonstrated good  understanding of the education provided.     Assessment   Earle is a 62 y.o. male referred to outpatient physical therapy with a medical diagnosis of R TKA due to primary osteoarthritis R knee. Demonstrates impairments including limitations as described in the problem list.  He presents with limited rom and strength R LOWER EXTREMITY following R TKA.  He requires use of a RW for mobility at this point  He presents with moderate to severe pain    Pt prognosis is Excellent. Positive prognostic factors include motivation to change, positive attitude. Negative prognostic factors include NA. Pt will benefit from skilled outpatient physical therapy to address the above stated deficits, provide pt/family education, and to maximize pt's level of independence.   Anticipated Barriers for therapy: none  Pt's spiritual, cultural and educational needs considered and pt agreeable to plan of care and goals as stated below:     Short Term GOALS:  In 4 weeks, pt. will:  Have 110 deg flexion R knee  3+/5 R knee strength  ind gait with  ft   Long Term GOALS:  In 8 weeks, pt. Will: have 120 deg flexion R knee  5/5 strength  R knee  Ind gait with no assistive device 500 ft  Have 2/10 pain   - be independent with HEP and SX management     Plan   Certification Period:  5/17/2022 to 8/17/22.    Outpatient physical therapy 2 times weekly to include: Manual Therapy, Self Care, Therapeutic Activities and Therapeutic Exercise. Cont PT for 3 months.   Pt may be seen by PTA as part of the rehabilitation team.     I certify the need for these services furnished under this plan of treatment and while under my care.    Kadie Nolan, PT

## 2022-05-18 ENCOUNTER — TELEPHONE (OUTPATIENT)
Dept: ORTHOPEDICS | Facility: CLINIC | Age: 62
End: 2022-05-18

## 2022-05-18 RX ORDER — OXYCODONE AND ACETAMINOPHEN 7.5; 325 MG/1; MG/1
1 TABLET ORAL EVERY 6 HOURS PRN
Qty: 28 TABLET | Refills: 0 | Status: SHIPPED | OUTPATIENT
Start: 2022-05-18 | End: 2022-05-25

## 2022-05-18 NOTE — TELEPHONE ENCOUNTER
Electronically prescribed a prescription for Percocet 7.5 mg with instructions to take 1 tablet by mouth every 6 hours as needed for pain.  I prescribed quantity 28.  Patient is approximately 4 weeks status post total knee arthroplasty.    Patient has had some difficulty with postoperative pain management.  His initial postop visit he was stating that the Percocet was ineffective in treating his pain he was transitioned to hydrocodone.  This was done on May the night.  He subsequently called the clinic a couple of days later and states the hydrocodone was not helping for his pain and was given tramadol 50 mg.  Today's message she specifically requests Percocet again.  This is reasonable postop total knee arthroplasty at this time.  Refill as above.      ----- Message from Argelia Josue sent at 5/18/2022 10:43 AM CDT -----  Regarding: Medication Question  Patient is calling because they need the medication increased, it was stated that the Percocet was better for this patient.      
none

## 2022-05-20 ENCOUNTER — CLINICAL SUPPORT (OUTPATIENT)
Dept: REHABILITATION | Facility: HOSPITAL | Age: 62
End: 2022-05-20
Payer: MEDICARE

## 2022-05-20 ENCOUNTER — EXTERNAL HOME HEALTH (OUTPATIENT)
Dept: HOME HEALTH SERVICES | Facility: HOSPITAL | Age: 62
End: 2022-05-20
Payer: MEDICARE

## 2022-05-20 DIAGNOSIS — R26.2 DIFFICULTY WALKING: Primary | ICD-10-CM

## 2022-05-20 PROCEDURE — 97110 THERAPEUTIC EXERCISES: CPT | Mod: 97

## 2022-05-20 NOTE — PROGRESS NOTES
"                                                    Physical Therapy Daily Note     Name: Earle Hoff  Clinic Number: [id number]  Diagnosis: No diagnosis found.  Physician: Adama Howard, *  Precautions: none  Visit #: 2 of 12  PTA Visit #: 0  Time In: 09:30  Time Out: 10:15    Subjective     Pt reports: pain began last night   States he was doing well after his last visit   Pain Scale: Earle rates pain on a scale of 0-10 to be 7 currently.    Objective     Earle received individual therapeutic exercises to develop strength, endurance, ROM and flexibility for 38 minutes including:  SAQ x 50  TKE x 30  SLR x 25  Heel slides on SB  Bridges with SB  Heel cord stretches  Toe raises  Toe taps  Step ups 4" x 30  Written Home Exercises Provided:  Quad sets, slr,ankle pumps  Pt demo good understanding of the education provided. Earle demonstrated good return demonstration of activities.     Education provided re:  Earle verbalized good understanding of education provided.   No spiritual or educational barriers to learning provided    Assessment     Patient tolerated therapeutic exercises  This is a 62 y.o. male referred to outpatient physical therapy and presents with a medical diagnosis of R TKA and demonstrates limitations as described in the problem list. Pt prognosis is Good. Pt will continue to benefit from skilled outpatient physical therapy to address the deficits listed in the problem list, provide pt/family education and to maximize pt's level of independence in the home and community environment.     Goals as follows:  To restore to PLF with ind community gait with no assistive device pain free     Plan     Continue with established Plan of Care towards PT goals.    Therapist: Kadie Nolan, PT  5/20/2022      "

## 2022-05-23 ENCOUNTER — CLINICAL SUPPORT (OUTPATIENT)
Dept: REHABILITATION | Facility: HOSPITAL | Age: 62
End: 2022-05-23
Payer: COMMERCIAL

## 2022-05-23 DIAGNOSIS — M25.561 PAIN IN JOINT OF RIGHT KNEE: Primary | ICD-10-CM

## 2022-05-23 PROCEDURE — 97140 MANUAL THERAPY 1/> REGIONS: CPT | Mod: 97

## 2022-05-23 PROCEDURE — 97110 THERAPEUTIC EXERCISES: CPT

## 2022-05-23 NOTE — PROGRESS NOTES
"                                                    Physical Therapy Daily Note     Name: Earle Hoff  Clinic Number: [id number]  Diagnosis: No diagnosis found.  Physician: Adama Howard, *  Precautions: none  Visit #: 2 of 12  PTA Visit #: 0  Time In: 09:30  Time Out: 10:15    Subjective     Pt reports: pain began last night   States he was doing well after his last visit   Pain Scale: Earle rates pain on a scale of 0-10 to be 7 currently.    Objective     Earle received individual therapeutic exercises to develop strength, endurance, ROM and flexibility for 23 minutes including:  SAQ x 50  TKE x 30  SLR x 25  Heel slides on SB  Bridges with SB  Heel cord stretches  Toe raises  Toe taps  Step ups 4" x 30  PROM flexion with strap    Manual therapy, MYOFASCIAL RELEASE  Scar massage, joint mobs, patellar mobs x 15 min     Written Home Exercises Provided:  Quad sets, slr,ankle pumps  Pt demo good understanding of the education provided. Earle demonstrated good return demonstration of activities.     Education provided re:  Earle verbalized good understanding of education provided.   No spiritual or educational barriers to learning provided    Assessment     Patient tolerated therapeutic exercises  This is a 62 y.o. male referred to outpatient physical therapy and presents with a medical diagnosis of R TKA and demonstrates limitations as described in the problem list. Pt prognosis is Good. Pt will continue to benefit from skilled outpatient physical therapy to address the deficits listed in the problem list, provide pt/family education and to maximize pt's level of independence in the home and community environment.   95 deg flexion   Full extension   Goals as follows:  To restore to PLF with ind community gait with no assistive device pain free     Plan     Continue with established Plan of Care towards PT goals.    Therapist: Kadie Nolan, PT  5/23/2022      "

## 2022-05-26 ENCOUNTER — CLINICAL SUPPORT (OUTPATIENT)
Dept: REHABILITATION | Facility: HOSPITAL | Age: 62
End: 2022-05-26
Payer: MEDICARE

## 2022-05-26 DIAGNOSIS — R26.2 DIFFICULTY WALKING: Primary | ICD-10-CM

## 2022-05-26 PROCEDURE — 97110 THERAPEUTIC EXERCISES: CPT

## 2022-05-26 NOTE — PROGRESS NOTES
"                                                    Physical Therapy Daily Note     Name: Earle Hoff  Clinic Number: [id number]  Diagnosis: No diagnosis found.  Physician: Adama Howard, *  Precautions: none  Visit #: 24of 12  PTA Visit #: 0  Time In: 09:30  Time Out: 10:15    Subjective     Pt reports: pain began last night   States he was doing well after his last visit   Pain Scale: Earle rates pain on a scale of 0-10 to be 7 currently.    Objective     Earle received individual therapeutic exercises to develop strength, endurance, ROM and flexibility for 45minutes including:  SAQ x 50 3 #  TKE x 30 3#  SLR x 25  Heel slides on SB  Bridges with SB  Heel cord stretches  Toe raises  Toe taps  Step ups 4" x 30  PROM flexion with strap    np  Manual therapy, MYOFASCIAL RELEASE  Scar massage, joint mobs, patellar mobs x 15 min     Written Home Exercises Provided:  Quad sets, slr,ankle pumps  Pt demo good understanding of the education provided. Earle demonstrated good return demonstration of activities.     Education provided re:  Earle verbalized good understanding of education provided.   No spiritual or educational barriers to learning provided    Assessment     Patient tolerated therapeutic exercises  This is a 62 y.o. male referred to outpatient physical therapy and presents with a medical diagnosis of R TKA and demonstrates limitations as described in the problem list. Pt prognosis is Good. Pt will continue to benefit from skilled outpatient physical therapy to address the deficits listed in the problem list, provide pt/family education and to maximize pt's level of independence in the home and community environment.   95 deg flexion   Full extension   Goals as follows:  To restore to PLF with ind community gait with no assistive device pain free     Plan     Continue with established Plan of Care towards PT goals.    Therapist: Kadie Nolan, PT  5/26/2022      "

## 2022-05-31 ENCOUNTER — CLINICAL SUPPORT (OUTPATIENT)
Dept: REHABILITATION | Facility: HOSPITAL | Age: 62
End: 2022-05-31
Payer: MEDICARE

## 2022-05-31 DIAGNOSIS — R26.2 DIFFICULTY WALKING: Primary | ICD-10-CM

## 2022-05-31 PROCEDURE — 97110 THERAPEUTIC EXERCISES: CPT | Mod: 97

## 2022-05-31 PROCEDURE — 97140 MANUAL THERAPY 1/> REGIONS: CPT | Mod: 97

## 2022-05-31 NOTE — PROGRESS NOTES
"                                                    Physical Therapy Daily Note     Name: Earle Hoff  Clinic Number: [id number]  Diagnosis: No diagnosis found.  Physician: Adama Howard, *  Precautions: none  Visit #: 5 of 12  PTA Visit #: 0  Time In: 08:42  Time Out: 9:30    Subjective     Pt reports: he is walking better and feels ready to start using his cane    Pain Scale: Earle rates pain on a scale of 0-10 to be 5 currently.    Objective     Earle received individual therapeutic exercises to develop strength, endurance, ROM and flexibility for 38 minutes including:  SAQ x 50 3 #  TKE x 30 3#  SLR x 25  Heel slides on SB  Bridges with SB  Heel cord stretches  Toe raises  Toe taps  Step ups 4" x 30  PROM flexion with strap    Manual therapy, MYOFASCIAL RELEASE  Scar massage, joint mobs, patellar mobs, PROM x 8 min     Gait training with SPC on level surface in clinic throughout treatment session, initial verbal cueing required for correct side and sequencing      Written Home Exercises Provided:  Patient to continue with current HEP  Pt demo good understanding of the education provided. Earle demonstrated good return demonstration of activities.     Education provided re:  Earle verbalized good understanding of education provided.   No spiritual or educational barriers to learning provided    Assessment     Patient tolerated therapeutic exercises. Patient instructed to continue using RW in community environment while practicing with SPC in clinic.  This is a 62 y.o. male referred to outpatient physical therapy and presents with a medical diagnosis of R TKA and demonstrates limitations as described in the problem list. Pt prognosis is Good. Pt will continue to benefit from skilled outpatient physical therapy to address the deficits listed in the problem list, provide pt/family education and to maximize pt's level of independence in the home and community environment.   97 deg flexion   Full " extension   Goals as follows:  To restore to PLF with ind community gait with no assistive device pain free     Plan     Continue with established Plan of Care towards PT goals.    Therapist: Faisal Weems, PT  5/31/2022

## 2022-06-07 ENCOUNTER — OFFICE VISIT (OUTPATIENT)
Dept: ORTHOPEDICS | Facility: CLINIC | Age: 62
End: 2022-06-07
Payer: COMMERCIAL

## 2022-06-07 VITALS — BODY MASS INDEX: 42.95 KG/M2 | HEIGHT: 69 IN | WEIGHT: 290 LBS

## 2022-06-07 DIAGNOSIS — Z96.651 S/P TOTAL KNEE ARTHROPLASTY, RIGHT: Primary | ICD-10-CM

## 2022-06-07 PROCEDURE — 1160F PR REVIEW ALL MEDS BY PRESCRIBER/CLIN PHARMACIST DOCUMENTED: ICD-10-PCS | Mod: CPTII,S$GLB,, | Performed by: ORTHOPAEDIC SURGERY

## 2022-06-07 PROCEDURE — 99024 PR POST-OP FOLLOW-UP VISIT: ICD-10-PCS | Mod: S$GLB,,, | Performed by: ORTHOPAEDIC SURGERY

## 2022-06-07 PROCEDURE — 3008F PR BODY MASS INDEX (BMI) DOCUMENTED: ICD-10-PCS | Mod: CPTII,S$GLB,, | Performed by: ORTHOPAEDIC SURGERY

## 2022-06-07 PROCEDURE — 3008F BODY MASS INDEX DOCD: CPT | Mod: CPTII,S$GLB,, | Performed by: ORTHOPAEDIC SURGERY

## 2022-06-07 PROCEDURE — 99024 POSTOP FOLLOW-UP VISIT: CPT | Mod: S$GLB,,, | Performed by: ORTHOPAEDIC SURGERY

## 2022-06-07 PROCEDURE — 1160F RVW MEDS BY RX/DR IN RCRD: CPT | Mod: CPTII,S$GLB,, | Performed by: ORTHOPAEDIC SURGERY

## 2022-06-07 PROCEDURE — 1159F MED LIST DOCD IN RCRD: CPT | Mod: CPTII,S$GLB,, | Performed by: ORTHOPAEDIC SURGERY

## 2022-06-07 PROCEDURE — 1159F PR MEDICATION LIST DOCUMENTED IN MEDICAL RECORD: ICD-10-PCS | Mod: CPTII,S$GLB,, | Performed by: ORTHOPAEDIC SURGERY

## 2022-06-07 RX ORDER — OXYCODONE AND ACETAMINOPHEN 7.5; 325 MG/1; MG/1
1 TABLET ORAL EVERY 6 HOURS PRN
Qty: 28 TABLET | Refills: 0 | Status: SHIPPED | OUTPATIENT
Start: 2022-06-07 | End: 2022-06-14

## 2022-06-07 RX ORDER — OXYCODONE AND ACETAMINOPHEN 7.5; 325 MG/1; MG/1
1 TABLET ORAL
COMMUNITY
End: 2022-06-07 | Stop reason: SDUPTHER

## 2022-06-07 NOTE — PROGRESS NOTES
[facility] ORTHOPEDICS POST-OP NOTE    Subjective:           Chief Complaint:   Chief Complaint   Patient presents with    Right Knee - Post-op Evaluation     Pt is here for a PO f/up Right TKA 4/25/22 & PT Status, PT is helping, need refill on pain medication.        Past Medical History:   Diagnosis Date    Arthritis     Diabetes mellitus     Edema, lower extremity     Gout     Hyperlipidemia     Stasis ulcer of lower extremity, left        Past Surgical History:   Procedure Laterality Date    KNEE ARTHROSCOPY W/ MENISCAL REPAIR      left knee meniscus repair      left shoulder rotator cuff repair      ROBOTIC ARTHROPLASTY, KNEE Right 4/25/2022    Procedure: ROBOTIC ARTHROPLASTY, KNEE, TOTAL;  Surgeon: Raghav Ortiz MD;  Location: [facility];  Service: Orthopedics;  Laterality: Right;    SHOULDER ARTHROSCOPY      TONSILLECTOMY         Current Outpatient Medications   Medication Sig    aspirin 81 MG Chew Take 1 tablet (81 mg total) by mouth 2 (two) times a day. for 21 days (Patient taking differently: Take 81 mg by mouth once.)    atorvastatin (LIPITOR) 20 MG tablet 20 mg.    EUCRISA 2 % Oint Apply 8 g topically once daily.    folic acid (FOLVITE) 1 MG tablet     furosemide (LASIX) 20 MG tablet Take 1 tablet (20 mg total) by mouth daily as needed (Leg swelling).    hydrOXYzine HCl (ATARAX) 25 MG tablet     metFORMIN (GLUCOPHAGE) 1000 MG tablet Take 1,000 mg by mouth once.    oxyCODONE-acetaminophen (PERCOCET) 7.5-325 mg per tablet Take 1 tablet by mouth as needed for Pain (Takes prior to Therapy appointments).    traMADoL (ULTRAM) 50 mg tablet Take 1 tablet (50 mg total) by mouth every 6 (six) hours as needed for Pain.    TRUE METRIX GLUCOSE TEST STRIP Strp     TRUEPLUS LANCETS 33 gauge Misc     pregabalin (LYRICA) 75 MG capsule Take 1 capsule (75 mg total) by mouth 3 (three) times daily.     No current facility-administered medications for this visit.       Review of patient's allergies  indicates:   Allergen Reactions    Pcn [penicillins]        Family History   Problem Relation Age of Onset    No Known Problems Mother     Diabetes Mellitus Father     No Known Problems Sister     No Known Problems Brother     No Known Problems Daughter     No Known Problems Son     No Known Problems Maternal Aunt     No Known Problems Maternal Uncle     No Known Problems Paternal Aunt     No Known Problems Paternal Uncle     No Known Problems Maternal Grandmother     No Known Problems Maternal Grandfather     No Known Problems Paternal Grandmother     No Known Problems Paternal Grandfather        Social History     Socioeconomic History    Marital status:    Tobacco Use    Smoking status: Current Some Day Smoker     Packs/day: 0.50     Years: 40.00     Pack years: 20.00     Types: Cigarettes     Last attempt to quit: 12/31/2018     Years since quitting: 3.4    Smokeless tobacco: Never Used   Substance and Sexual Activity    Alcohol use: No    Drug use: No    Sexual activity: Yes       History of present illness:  Patient comes in today for the right knee.  He is doing very well.  Not really having a lot issues      Review of Systems:    Musculoskeletal:  See HPI      Objective:        Physical Examination:    Vital Signs:  There were no vitals filed for this visit.    Body mass index is 42.83 kg/m².    This a well-developed, well nourished patient in no acute distress.  They are alert and oriented and cooperative to examination.        Range of motion is 0-95 degrees.  His calf is soft.  Homans sign is negative.  Pertinent New Results:    XRAY Report / Interpretation:   Three views of the right knee demonstrate his right total knee to be in ideal position.  No change in position from prior films    Assessment/Plan:      Stable following right total knee.  Doing very well.  Follow-up in 3 months.    This note was created using Dragon voice recognition software that occasionally  misinterpreted phrases or words.

## 2022-06-08 ENCOUNTER — CLINICAL SUPPORT (OUTPATIENT)
Dept: REHABILITATION | Facility: HOSPITAL | Age: 62
End: 2022-06-08
Payer: MEDICARE

## 2022-06-08 DIAGNOSIS — R26.2 DIFFICULTY WALKING: Primary | ICD-10-CM

## 2022-06-08 PROCEDURE — 97110 THERAPEUTIC EXERCISES: CPT

## 2022-06-08 NOTE — PROGRESS NOTES
"                                                    Physical Therapy Daily Note     Name: Earle Hoff  Clinic Number: [id number]  Diagnosis:   Encounter Diagnosis   Name Primary?    Difficulty walking Yes     Physician: Adama Howard, *  Precautions: none  Visit #: 6 of 12  PTA Visit #: 0  Time In: 08:40  Time Out: 9:25    Subjective     Pt reports:states his MD gave him a good report, states he is using his cane for gait    Pain Scale: Earle rates pain on a scale of 0-10 to be 5 currently.    Objective     Earle received individual therapeutic exercises to develop strength, endurance, ROM and flexibility for 38 minutes including:  SAQ x 50 5#  TKE x 30 5#  SLR x 25  Heel slides on SB  Bridges with SB  Heel cord stretches  Toe raises  Toe taps  Step ups 4" x 30  PROM flexion with strap  PROM right knee     Manual therapy, MYOFASCIAL RELEASE  Scar massage, joint mobs, patellar mobs, PROM x 8 min  np    Gait training with SPC on level surface in clinic throughout treatment session, initial verbal cueing required for correct side and sequencing      Written Home Exercises Provided:  Patient to continue with current HEP  Pt demo good understanding of the education provided. Earle demonstrated good return demonstration of activities.     Education provided re:  Earle verbalized good understanding of education provided.   No spiritual or educational barriers to learning provided    Assessment     Patient tolerated therapeutic exercises. Patient instructed to continue using RW in community environment while practicing with SPC in clinic.  This is a 62 y.o. male referred to outpatient physical therapy and presents with a medical diagnosis of R TKA and demonstrates limitations as described in the problem list. Pt prognosis is Good. Pt will continue to benefit from skilled outpatient physical therapy to address the deficits listed in the problem list, provide pt/family education and to maximize pt's level of " independence in the home and community environment.     105 deg flexion   Full extension   Goals as follows:    To restore to PLF with ind community gait with no assistive device pain free     Plan     Continue with established Plan of Care towards PT goals.    Therapist: Kadie Nolan, PT  6/8/2022

## 2022-06-10 ENCOUNTER — CLINICAL SUPPORT (OUTPATIENT)
Dept: REHABILITATION | Facility: HOSPITAL | Age: 62
End: 2022-06-10
Payer: MEDICARE

## 2022-06-10 DIAGNOSIS — R26.2 DIFFICULTY WALKING: Primary | ICD-10-CM

## 2022-06-10 NOTE — PROGRESS NOTES
"                                                    Physical Therapy Daily Note     Name: Earle Hoff  Clinic Number: [id number]  Diagnosis:   Encounter Diagnosis   Name Primary?    Difficulty walking Yes     Physician: Adama Howard, *  Precautions: none  Visit #: 7 of 12  PTA Visit #: 0  Time In: 0930  Time Out: 1015    Subjective     Pt reports:states his MD gave him a good report, states he is using his cane for gait    Pain Scale: Earle rates pain on a scale of 0-10 to be 5 currently.    Objective     Earle received individual therapeutic exercises to develop strength, endurance, ROM and flexibility for 38 minutes including:\  Nustep x 10  SAQ x 50 5#  TKE x 30 5#  SLR x 25  Heel slides on SB  Bridges with SB  Heel cord stretches  Toe raises  Toe taps  Step ups 4" x 30  PROM flexion with strap  PROM right knee     Manual therapy, MYOFASCIAL RELEASE  Scar massage, joint mobs, patellar mobs, PROM x 8 min  np    Gait training with SPC on level surface in clinic throughout treatment session, initial verbal cueing required for correct side and sequencing      Written Home Exercises Provided:  Patient to continue with current HEP  Pt demo good understanding of the education provided. Earle demonstrated good return demonstration of activities.     Education provided re:  Earle verbalized good understanding of education provided.   No spiritual or educational barriers to learning provided    Assessment     Patient tolerated therapeutic exercises. Patient instructed to continue using RW in community environment while practicing with SPC in clinic.  This is a 62 y.o. male referred to outpatient physical therapy and presents with a medical diagnosis of R TKA and demonstrates limitations as described in the problem list. Pt prognosis is Good. Pt will continue to benefit from skilled outpatient physical therapy to address the deficits listed in the problem list, provide pt/family education and to maximize " pt's level of independence in the home and community environment.     105 deg flexion   Full extension   Goals as follows:    To restore to PLF with ind community gait with no assistive device pain free     Plan     Continue with established Plan of Care towards PT goals.    Therapist: Kadie Nloan, PT  6/10/2022

## 2022-06-13 ENCOUNTER — CLINICAL SUPPORT (OUTPATIENT)
Dept: REHABILITATION | Facility: HOSPITAL | Age: 62
End: 2022-06-13
Payer: MEDICARE

## 2022-06-13 PROCEDURE — 97110 THERAPEUTIC EXERCISES: CPT

## 2022-06-13 NOTE — PROGRESS NOTES
"                                                    Physical Therapy Daily Note     Name: Earle Hoff  Clinic Number: [id number]  Diagnosis:   No diagnosis found.  Physician: Adama Howard, *  Precautions: none  Visit #: 7 of 12  PTA Visit #: 0  Time In: 845  Time Out: 930    Subjective     Pt reports:has some knee cap pain today     Pain Scale: Earle rates pain on a scale of 0-10 to be 5 currently.    Objective     Earle received individual therapeutic exercises to develop strength, endurance, ROM and flexibility for 38 minutes including:\  Nustep x 10  SAQ x 50 5#  TKE x 30 5#  SLR x 25  Heel slides on SB  Bridges with SB  Heel cord stretches  Toe raises  Toe taps  Step ups 4" x 30  PROM flexion with strap  PROM right knee   SLS 30 sec x 5  Manual therapy, MYOFASCIAL RELEASE  Scar massage, joint mobs, patellar mobs, PROM x 8 min  np    Gait training with SPC on level surface in clinic throughout treatment session, initial verbal cueing required for correct side and sequencing      Written Home Exercises Provided:  Patient to continue with current HEP  Pt demo good understanding of the education provided. Earle demonstrated good return demonstration of activities.     Education provided re:  Earle verbalized good understanding of education provided.   No spiritual or educational barriers to learning provided    Assessment     Patient tolerated therapeutic exercises. Patient instructed to continue using RW in community environment while practicing with SPC in clinic.  This is a 62 y.o. male referred to outpatient physical therapy and presents with a medical diagnosis of R TKA and demonstrates limitations as described in the problem list. Pt prognosis is Good. Pt will continue to benefit from skilled outpatient physical therapy to address the deficits listed in the problem list, provide pt/family education and to maximize pt's level of independence in the home and community environment.     110 deg " flexion   Full extension   Goals as follows:    To restore to PLF with ind community gait with no assistive device pain free     Plan     Continue with established Plan of Care towards PT goals.    Therapist: Kadie Nolan, PT  6/13/2022

## 2022-06-15 ENCOUNTER — CLINICAL SUPPORT (OUTPATIENT)
Dept: REHABILITATION | Facility: HOSPITAL | Age: 62
End: 2022-06-15
Payer: MEDICARE

## 2022-06-15 DIAGNOSIS — R26.2 DIFFICULTY WALKING: Primary | ICD-10-CM

## 2022-06-15 PROCEDURE — 97110 THERAPEUTIC EXERCISES: CPT

## 2022-06-15 NOTE — PROGRESS NOTES
"                                                    Physical Therapy Daily Note     Name: Earle Hoff  Clinic Number: [id number]  Diagnosis:   Encounter Diagnosis   Name Primary?    Difficulty walking Yes     Physician: Adama Howard, *  Precautions: none  Visit #: 9 of 12  PTA Visit #: 0  Time In: 835  Time Out: 920    Subjective     Pt reports:has some knee cap pain today     Pain Scale: Earle rates pain on a scale of 0-10 to be 5 currently.    Objective     Earle received individual therapeutic exercises to develop strength, endurance, ROM and flexibility for 38 minutes including:\  Nustep x 10  SAQ x 50 5#  TKE x 30 5#  SLR x 25  Heel slides on SB  Bridges with SB  Heel cord stretches  Toe raises  Toe taps  Step ups 4" x 30  PROM flexion with strap  PROM right knee   SLS 30 sec x 5  Manual therapy, MYOFASCIAL RELEASE  Scar massage, joint mobs, patellar mobs, PROM x 8 min  np    Gait training with SPC on level surface in clinic throughout treatment session, initial verbal cueing required for correct side and sequencing      Written Home Exercises Provided:  Patient to continue with current HEP  Pt demo good understanding of the education provided. Earle demonstrated good return demonstration of activities.     Education provided re:  Earle verbalized good understanding of education provided.   No spiritual or educational barriers to learning provided    Assessment     Patient tolerated therapeutic exercises.   This is a 62 y.o. male referred to outpatient physical therapy and presents with a medical diagnosis of R TKA and demonstrates limitations as described in the problem list. Pt prognosis is Good. Pt will continue to benefit from skilled outpatient physical therapy to address the deficits listed in the problem list, provide pt/family education and to maximize pt's level of independence in the home and community environment.     110 deg flexion   Full extension   Goals as follows:    To restore " to PLF with ind community gait with no assistive device pain free     Plan     Continue with established Plan of Care towards PT goals.    Therapist: Kadie Nolan, PT  6/15/2022

## 2022-06-20 ENCOUNTER — CLINICAL SUPPORT (OUTPATIENT)
Dept: REHABILITATION | Facility: HOSPITAL | Age: 62
End: 2022-06-20
Payer: MEDICARE

## 2022-06-20 DIAGNOSIS — R26.2 DIFFICULTY WALKING: Primary | ICD-10-CM

## 2022-06-20 PROCEDURE — 97110 THERAPEUTIC EXERCISES: CPT

## 2022-06-20 NOTE — PROGRESS NOTES
"                                                    Physical Therapy Daily Note     Name: Earle Hoff  Clinic Number: [id number]  Diagnosis:   Encounter Diagnosis   Name Primary?    Difficulty walking Yes     Physician: Adama Howard, *  Precautions: none  Visit #: 10 of 12  PTA Visit #: 0  Time In: 905  Time Out: 950  Subjective     Pt reports:has some knee cap pain today     Pain Scale: Earle rates pain on a scale of 0-10 to be 5 currently.    Objective     Earle received individual therapeutic exercises to develop strength, endurance, ROM and flexibility for 38 minutes including:\  Nustep x 10  SAQ x 50 5#  TKE x 30 5#  SLR x 25  Heel slides on SB  Bridges with SB  Heel cord stretches  Toe raises  Toe taps  Step ups 4" x 30  PROM flexion with strap  PROM right knee   SLS 30 sec x 5  Manual therapy, MYOFASCIAL RELEASE  Scar massage, joint mobs, patellar mobs, PROM x 8 min  np    Gait training with SPC on level surface in clinic throughout treatment session, initial verbal cueing required for correct side and sequencing      Written Home Exercises Provided:  Patient to continue with current HEP  Pt demo good understanding of the education provided. Earle demonstrated good return demonstration of activities.     Education provided re:  Earle verbalized good understanding of education provided.   No spiritual or educational barriers to learning provided    Assessment     Patient tolerated therapeutic exercises.   This is a 62 y.o. male referred to outpatient physical therapy and presents with a medical diagnosis of R TKA and demonstrates limitations as described in the problem list. Pt prognosis is Good. Pt will continue to benefit from skilled outpatient physical therapy to address the deficits listed in the problem list, provide pt/family education and to maximize pt's level of independence in the home and community environment.     110 deg flexion   Full extension   Goals as follows:    To restore " to PLF with ind community gait with no assistive device pain free     Plan     Continue with established Plan of Care towards PT goals.    Therapist: Kadie Nolan, PT  6/20/2022

## 2022-06-22 ENCOUNTER — CLINICAL SUPPORT (OUTPATIENT)
Dept: REHABILITATION | Facility: HOSPITAL | Age: 62
End: 2022-06-22
Payer: MEDICARE

## 2022-06-22 DIAGNOSIS — R26.2 DIFFICULTY WALKING: Primary | ICD-10-CM

## 2022-06-22 PROCEDURE — 97110 THERAPEUTIC EXERCISES: CPT

## 2022-06-22 NOTE — PROGRESS NOTES
"                                                    Physical Therapy Daily Note     Name: Earle Hoff  Clinic Number: [id number]  Diagnosis:   Encounter Diagnosis   Name Primary?    Difficulty walking Yes     Physician: Adama Howard, *  Precautions: none  Visit #: 12 of 12  PTA Visit #: 0  Time In: 1015  Time Out: 1100  Subjective     Pt reports  Reports he is IND with adls with no AD  Pain Scale: Earle rates pain on a scale of 0-10 to be 3 currently.    Objective     Earle received individual therapeutic exercises to develop strength, endurance, ROM and flexibility for 38 minutes including:\  Nustep x 10  SAQ x 50 5#  TKE x 30 5#  SLR x 25  Heel slides on SB  Bridges with SB  Heel cord stretches  Toe raises  Toe taps  Step ups 4" x 30  PROM flexion with strap  PROM right knee   SLS 30 sec x 5  Leg press 70# x 30  Manual therapy, MYOFASCIAL RELEASE  Scar massage, joint mobs, patellar mobs, PROM x 8 min  np    Gait training with SPC on level surface in clinic throughout treatment session, initial verbal cueing required for correct side and sequencing      Written Home Exercises Provided:  Patient to continue with current HEP  Pt demo good understanding of the education provided. Earle demonstrated good return demonstration of activities.     Education provided re:  Earle verbalized good understanding of education provided.   No spiritual or educational barriers to learning provided    Assessment     Patient tolerated therapeutic exercises.   This is a 62 y.o. male referred to outpatient physical therapy and presents with a medical diagnosis of R TKA and demonstrates limitations as described in the problem list. Pt prognosis is Good. Pt will continue to benefit from skilled outpatient physical therapy to address the deficits listed in the problem list, provide pt/family education and to maximize pt's level of independence in the home and community environment.     112 deg flexion   Full extension "   Goals as follows:    To restore to PLF with ind community gait with no assistive device pain free Basically Met   continues to have 3/10 pain   Plan     Discharge to IND HEP Therapist: Kadie Nolan, PT  6/22/2022

## 2022-07-15 ENCOUNTER — TELEPHONE (OUTPATIENT)
Dept: ORTHOPEDICS | Facility: CLINIC | Age: 62
End: 2022-07-15

## 2022-07-15 RX ORDER — HYDROCODONE BITARTRATE AND ACETAMINOPHEN 5; 325 MG/1; MG/1
1 TABLET ORAL EVERY 8 HOURS PRN
Qty: 21 TABLET | Refills: 0 | Status: SHIPPED | OUTPATIENT
Start: 2022-07-15 | End: 2022-07-22

## 2022-07-15 NOTE — TELEPHONE ENCOUNTER
Electronically prescribed a prescription for Norco 5 mg with instructions to take 1 tablet by mouth every 8 hours as needed for pain.  I prescribed quantity 21.  Patient is approximately 11 weeks status post total knee arthroplasty.    In keeping with clinic policy, we are tapering from Percocet 7.5 mg to Norco 5 mg.    ----- Message from Argelia Josue sent at 7/15/2022  9:18 AM CDT -----  Regarding: RX Request  Phone #: [phone]  Patient is requesting a refill of Percocet  Pharmacy:   [facility] [address] [place] [id number]  Phone: [phone] Fax: [phone]

## 2022-09-06 ENCOUNTER — OFFICE VISIT (OUTPATIENT)
Dept: ORTHOPEDICS | Facility: CLINIC | Age: 62
End: 2022-09-06
Payer: COMMERCIAL

## 2022-09-06 VITALS
HEIGHT: 69 IN | SYSTOLIC BLOOD PRESSURE: 160 MMHG | WEIGHT: 290 LBS | BODY MASS INDEX: 42.95 KG/M2 | DIASTOLIC BLOOD PRESSURE: 88 MMHG

## 2022-09-06 DIAGNOSIS — Z96.651 HISTORY OF TOTAL KNEE REPLACEMENT, RIGHT: Primary | ICD-10-CM

## 2022-09-06 PROCEDURE — 1160F PR REVIEW ALL MEDS BY PRESCRIBER/CLIN PHARMACIST DOCUMENTED: ICD-10-PCS | Mod: CPTII,S$GLB,, | Performed by: ORTHOPAEDIC SURGERY

## 2022-09-06 PROCEDURE — 3077F SYST BP >= 140 MM HG: CPT | Mod: CPTII,S$GLB,, | Performed by: ORTHOPAEDIC SURGERY

## 2022-09-06 PROCEDURE — 3008F BODY MASS INDEX DOCD: CPT | Mod: CPTII,S$GLB,, | Performed by: ORTHOPAEDIC SURGERY

## 2022-09-06 PROCEDURE — 99213 PR OFFICE/OUTPT VISIT, EST, LEVL III, 20-29 MIN: ICD-10-PCS | Mod: S$GLB,,, | Performed by: ORTHOPAEDIC SURGERY

## 2022-09-06 PROCEDURE — 3077F PR MOST RECENT SYSTOLIC BLOOD PRESSURE >= 140 MM HG: ICD-10-PCS | Mod: CPTII,S$GLB,, | Performed by: ORTHOPAEDIC SURGERY

## 2022-09-06 PROCEDURE — 1159F MED LIST DOCD IN RCRD: CPT | Mod: CPTII,S$GLB,, | Performed by: ORTHOPAEDIC SURGERY

## 2022-09-06 PROCEDURE — 99213 OFFICE O/P EST LOW 20 MIN: CPT | Mod: S$GLB,,, | Performed by: ORTHOPAEDIC SURGERY

## 2022-09-06 PROCEDURE — 3079F PR MOST RECENT DIASTOLIC BLOOD PRESSURE 80-89 MM HG: ICD-10-PCS | Mod: CPTII,S$GLB,, | Performed by: ORTHOPAEDIC SURGERY

## 2022-09-06 PROCEDURE — 1160F RVW MEDS BY RX/DR IN RCRD: CPT | Mod: CPTII,S$GLB,, | Performed by: ORTHOPAEDIC SURGERY

## 2022-09-06 PROCEDURE — 3008F PR BODY MASS INDEX (BMI) DOCUMENTED: ICD-10-PCS | Mod: CPTII,S$GLB,, | Performed by: ORTHOPAEDIC SURGERY

## 2022-09-06 PROCEDURE — 1159F PR MEDICATION LIST DOCUMENTED IN MEDICAL RECORD: ICD-10-PCS | Mod: CPTII,S$GLB,, | Performed by: ORTHOPAEDIC SURGERY

## 2022-09-06 PROCEDURE — 3079F DIAST BP 80-89 MM HG: CPT | Mod: CPTII,S$GLB,, | Performed by: ORTHOPAEDIC SURGERY

## 2022-09-06 RX ORDER — EZETIMIBE 10 MG/1
TABLET ORAL
COMMUNITY
Start: 2022-08-30

## 2022-09-06 RX ORDER — GLIMEPIRIDE 4 MG/1
TABLET ORAL
COMMUNITY
Start: 2022-06-17

## 2022-09-06 NOTE — PROGRESS NOTES
[facility] ORTHOPEDICS    Subjective:     Chief Complaint:   Chief Complaint   Patient presents with    Right Knee - Post-op Evaluation     S/P Rt TKA 04/25/22. States that he is doing very well.        Past Medical History:   Diagnosis Date    Arthritis     Diabetes mellitus     Edema, lower extremity     Gout     Hyperlipidemia     Stasis ulcer of lower extremity, left        Past Surgical History:   Procedure Laterality Date    KNEE ARTHROSCOPY W/ MENISCAL REPAIR      left knee meniscus repair      left shoulder rotator cuff repair      ROBOTIC ARTHROPLASTY, KNEE Right 4/25/2022    Procedure: ROBOTIC ARTHROPLASTY, KNEE, TOTAL;  Surgeon: Raghav Ortiz MD;  Location: [facility];  Service: Orthopedics;  Laterality: Right;    SHOULDER ARTHROSCOPY      TONSILLECTOMY         Current Outpatient Medications   Medication Sig    atorvastatin (LIPITOR) 20 MG tablet 20 mg.    EUCRISA 2 % Oint Apply 8 g topically once daily.    folic acid (FOLVITE) 1 MG tablet     furosemide (LASIX) 20 MG tablet Take 1 tablet (20 mg total) by mouth daily as needed (Leg swelling).    hydrOXYzine HCl (ATARAX) 25 MG tablet     metFORMIN (GLUCOPHAGE) 1000 MG tablet Take 1,000 mg by mouth once.    pregabalin (LYRICA) 75 MG capsule Take 1 capsule (75 mg total) by mouth 3 (three) times daily.    TRUE METRIX GLUCOSE TEST STRIP Strp     TRUEPLUS LANCETS 33 gauge Misc     aspirin 81 MG Chew Take 1 tablet (81 mg total) by mouth 2 (two) times a day. for 21 days (Patient taking differently: Take 81 mg by mouth once.)    ezetimibe (ZETIA) 10 mg tablet     glimepiride (AMARYL) 4 MG tablet      No current facility-administered medications for this visit.       Review of patient's allergies indicates:   Allergen Reactions    Pcn [penicillins]        Family History   Problem Relation Age of Onset    No Known Problems Mother     Diabetes Mellitus Father     No Known Problems Sister     No Known Problems Brother     No Known Problems Daughter     No Known Problems  Son     No Known Problems Maternal Aunt     No Known Problems Maternal Uncle     No Known Problems Paternal Aunt     No Known Problems Paternal Uncle     No Known Problems Maternal Grandmother     No Known Problems Maternal Grandfather     No Known Problems Paternal Grandmother     No Known Problems Paternal Grandfather        Social History     Socioeconomic History    Marital status:    Tobacco Use    Smoking status: Some Days     Packs/day: 0.50     Years: 40.00     Pack years: 20.00     Types: Cigarettes     Last attempt to quit: 12/31/2018     Years since quitting: 3.6    Smokeless tobacco: Never   Substance and Sexual Activity    Alcohol use: No    Drug use: No    Sexual activity: Yes       History of present illness:  60-year-old male returns clinic today status post right total knee arthroplasty done April 25th.  He has intermittent pains.  A little sensitivity to the skin around the surgical incision.  Overall he is doing well.  Pain is better than his preoperative pain.      Review of Systems:    Constitution: Negative for chills, fever, and sweats.  Negative for unexplained weight loss.    HENT:  Negative for headaches and blurry vision.    Cardiovascular:Negative for chest pain or irregular heart beat. Negative for hypertension.    Respiratory:  Negative for cough and shortness of breath.    Gastrointestinal: Negative for abdominal pain, heartburn, melena, nausea, and vomitting.    Genitourinary:  Negative bladder incontinence and dysuria.    Musculoskeletal:  See HPI for details.     Neurological: Negative for numbness.    Psychiatric/Behavioral: Negative for depression.  The patient is not nervous/anxious.      Endocrine: Negative for polyuria    Hematologic/Lymphatic: Negative for bleeding problem.  Does not bruise/bleed easily.    Skin: Negative for poor would healing and rash    Objective:      Physical Examination:    Vital Signs:    Vitals:    09/06/22 0901   BP: (!) 160/88       Body mass  "index is 42.83 kg/m².    This a well-developed, well nourished patient in no acute distress.  They are alert and oriented and cooperative to examination.        Examination of the right knee, demonstrates a well-healed midline surgical incision consistent with history of right total knee arthroplasty.  No evidence of wound failure dehiscence or infection.  No edema erythema or ecchymosis is noted.  Range of motion 0-110 degrees.  Stable anterior-posterior varus and valgus stress.  Calf soft nontender, straight leg raise negative.    Pertinent New Results:    XRAY Report / Interpretation:   AP lateral sunrise views of the right knee taken today in the office demonstrate a right total knee arthroplasty to be in appropriate position without evidence of hardware failure or loosening.    Assessment/Plan:      Stable status post right total knee arthroplasty, follow-up in 3 months.    Baltazar Diaz, Physician Assistant, served in the capacity as a "scribe" for this patient encounter.  A "face-to-face" encounter occurred with Dr. Raghav Ortiz on this date.  The treatment plan and medical decision-making is outlined above. Patient was seen and examined with a chaperone.       This note was created using Dragon voice recognition software that occasionally misinterpreted phrases or words.          "

## 2023-07-25 ENCOUNTER — HOSPITAL ENCOUNTER (OUTPATIENT)
Dept: RADIOLOGY | Facility: HOSPITAL | Age: 63
Discharge: HOME | End: 2023-07-25
Attending: FAMILY MEDICINE
Payer: MEDICARE

## 2023-07-25 DIAGNOSIS — M54.50 LOWER BACK PAIN: Primary | ICD-10-CM

## 2023-07-25 DIAGNOSIS — M54.50 LOWER BACK PAIN: ICD-10-CM

## 2023-07-25 PROCEDURE — 72110 X-RAY EXAM L-2 SPINE 4/>VWS: CPT | Mod: 26,,, | Performed by: RADIOLOGY

## 2023-07-25 PROCEDURE — 72110 XR LUMBAR SPINE COMPLETE 5 VIEW: ICD-10-PCS | Mod: 26,,, | Performed by: RADIOLOGY

## 2023-07-25 PROCEDURE — 72110 X-RAY EXAM L-2 SPINE 4/>VWS: CPT | Mod: TC

## 2023-12-27 ENCOUNTER — HOSPITAL ENCOUNTER (OUTPATIENT)
Dept: RADIOLOGY | Facility: HOSPITAL | Age: 63
Discharge: HOME | End: 2023-12-27
Attending: FAMILY MEDICINE
Payer: MEDICARE

## 2023-12-27 DIAGNOSIS — M25.521 RIGHT ELBOW PAIN: ICD-10-CM

## 2023-12-27 DIAGNOSIS — M25.521 RIGHT ELBOW PAIN: Primary | ICD-10-CM

## 2023-12-27 PROCEDURE — 73080 XR ELBOW COMPLETE 3 VIEW RIGHT: ICD-10-PCS | Mod: 26,RT,, | Performed by: RADIOLOGY

## 2023-12-27 PROCEDURE — 73080 X-RAY EXAM OF ELBOW: CPT | Mod: 26,RT,, | Performed by: RADIOLOGY

## 2023-12-27 PROCEDURE — 73080 X-RAY EXAM OF ELBOW: CPT | Mod: TC,RT

## 2024-08-06 ENCOUNTER — OFFICE VISIT (OUTPATIENT)
Dept: ORTHOPEDICS | Facility: CLINIC | Age: 64
End: 2024-08-06
Payer: MEDICARE

## 2024-08-06 ENCOUNTER — HOSPITAL ENCOUNTER (OUTPATIENT)
Dept: RADIOLOGY | Facility: HOSPITAL | Age: 64
Discharge: HOME | End: 2024-08-06
Attending: ORTHOPAEDIC SURGERY
Payer: MEDICARE

## 2024-08-06 VITALS — HEIGHT: 69 IN | BODY MASS INDEX: 34.8 KG/M2 | WEIGHT: 235 LBS

## 2024-08-06 DIAGNOSIS — Z96.651 HISTORY OF TOTAL KNEE REPLACEMENT, RIGHT: Primary | ICD-10-CM

## 2024-08-06 PROCEDURE — 99999 PR PBB SHADOW E&M-EST. PATIENT-LVL III: CPT | Mod: PBBFAC,,, | Performed by: ORTHOPAEDIC SURGERY

## 2024-08-06 PROCEDURE — 73562 X-RAY EXAM OF KNEE 3: CPT | Mod: TC,PN,RT

## 2024-08-06 PROCEDURE — 1160F RVW MEDS BY RX/DR IN RCRD: CPT | Mod: CPTII,S$GLB,, | Performed by: ORTHOPAEDIC SURGERY

## 2024-08-06 PROCEDURE — 3008F BODY MASS INDEX DOCD: CPT | Mod: CPTII,S$GLB,, | Performed by: ORTHOPAEDIC SURGERY

## 2024-08-06 PROCEDURE — 73562 X-RAY EXAM OF KNEE 3: CPT | Mod: 26,RT,, | Performed by: RADIOLOGY

## 2024-08-06 PROCEDURE — 99213 OFFICE O/P EST LOW 20 MIN: CPT | Mod: S$GLB,,, | Performed by: ORTHOPAEDIC SURGERY

## 2024-08-06 PROCEDURE — 1159F MED LIST DOCD IN RCRD: CPT | Mod: CPTII,S$GLB,, | Performed by: ORTHOPAEDIC SURGERY

## 2024-08-06 RX ORDER — EMPAGLIFLOZIN, METFORMIN HYDROCHLORIDE 12.5; 1 MG/1; MG/1
TABLET, EXTENDED RELEASE ORAL
COMMUNITY
Start: 2024-07-09

## 2025-01-10 ENCOUNTER — HOSPITAL ENCOUNTER (OUTPATIENT)
Dept: RADIOLOGY | Facility: HOSPITAL | Age: 65
Discharge: HOME | End: 2025-01-10
Attending: FAMILY MEDICINE
Payer: MEDICARE

## 2025-01-10 DIAGNOSIS — M25.559 HIP PAIN: ICD-10-CM
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
## 2025-01-10 DIAGNOSIS — M25.559 HIP PAIN: Primary | ICD-10-CM

## 2025-01-10 PROCEDURE — 73502 X-RAY EXAM HIP UNI 2-3 VIEWS: CPT | Mod: TC,LT

## 2025-01-13 DIAGNOSIS — M25.552 LEFT HIP PAIN: Primary | ICD-10-CM

## 2025-01-25 ENCOUNTER — OFFICE VISIT (OUTPATIENT)
Dept: ORTHOPEDICS | Facility: CLINIC | Age: 65
End: 2025-01-25
Payer: MEDICARE

## 2025-01-25 VITALS — BODY MASS INDEX: 34.8 KG/M2 | HEIGHT: 69 IN | WEIGHT: 235 LBS
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
## 2025-01-25 DIAGNOSIS — M70.62 GREATER TROCHANTERIC BURSITIS, LEFT: ICD-10-CM

## 2025-01-25 DIAGNOSIS — M16.12 PRIMARY OSTEOARTHRITIS OF LEFT HIP: ICD-10-CM

## 2025-01-25 PROCEDURE — 20610 DRAIN/INJ JOINT/BURSA W/O US: CPT | Mod: LT,S$GLB,, | Performed by: PHYSICIAN ASSISTANT

## 2025-01-25 PROCEDURE — 99999 PR PBB SHADOW E&M-EST. PATIENT-LVL III: CPT | Mod: PBBFAC,,, | Performed by: PHYSICIAN ASSISTANT

## 2025-01-25 PROCEDURE — 99213 OFFICE O/P EST LOW 20 MIN: CPT | Mod: 25,S$GLB,, | Performed by: PHYSICIAN ASSISTANT

## 2025-01-25 RX ORDER — TRIAMCINOLONE ACETONIDE 40 MG/ML
40 INJECTION, SUSPENSION INTRA-ARTICULAR; INTRAMUSCULAR
Status: DISCONTINUED | OUTPATIENT
Start: 2025-01-25 | End: 2025-01-25 | Stop reason: HOSPADM

## 2025-01-25 RX ADMIN — TRIAMCINOLONE ACETONIDE 40 MG: 40 INJECTION, SUSPENSION INTRA-ARTICULAR; INTRAMUSCULAR at 08:01

## 2025-01-25 NOTE — PROCEDURES
Large Joint Aspiration/Injection: L greater trochanteric bursa    Date/Time: 1/25/2025 8:45 AM    Performed by: Baltazar Diaz PA  Authorized by: Baltazar Diaz PA    Consent Done?:  Yes (Verbal)  Indications:  Pain  Site marked: the procedure site was marked    Timeout: prior to procedure the correct patient, procedure, and site was verified    Prep: patient was prepped and draped in usual sterile fashion      Local anesthesia used?: Yes    Local anesthetic:  Lidocaine 1% without epinephrine    Details:  Needle Size:  22 G  Ultrasonic Guidance for needle placement?: No    Location:  Hip  Site:  L greater trochanteric bursa  Medications:  40 mg triamcinolone acetonide 40 mg/mL  Patient tolerance:  Patient tolerated the procedure well with no immediate complications
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
## 2025-02-11 ENCOUNTER — HOSPITAL ENCOUNTER (OUTPATIENT)
Dept: RADIOLOGY | Facility: HOSPITAL | Age: 65
Discharge: HOME | End: 2025-02-11
Attending: PHYSICIAN ASSISTANT
Payer: MEDICARE

## 2025-02-11 DIAGNOSIS — M16.12 PRIMARY OSTEOARTHRITIS OF LEFT HIP: ICD-10-CM

## 2025-02-11 PROCEDURE — 25500020 PHARM REV CODE 255

## 2025-02-11 PROCEDURE — 63600175 PHARM REV CODE 636 W HCPCS

## 2025-02-11 PROCEDURE — 77002 NEEDLE LOCALIZATION BY XRAY: CPT | Mod: 26,,, | Performed by: RADIOLOGY

## 2025-02-11 PROCEDURE — 20610 DRAIN/INJ JOINT/BURSA W/O US: CPT | Mod: LT,,, | Performed by: RADIOLOGY

## 2025-02-11 PROCEDURE — 20610 DRAIN/INJ JOINT/BURSA W/O US: CPT | Mod: LT

## 2025-02-11 RX ORDER — LIDOCAINE HYDROCHLORIDE 10 MG/ML
INJECTION, SOLUTION EPIDURAL; INFILTRATION; INTRACAUDAL; PERINEURAL
Status: COMPLETED
Start: 2025-02-11 | End: 2025-02-11

## 2025-02-11 RX ORDER — BUPIVACAINE HYDROCHLORIDE 2.5 MG/ML
INJECTION, SOLUTION EPIDURAL; INFILTRATION; INTRACAUDAL
Status: COMPLETED
Start: 2025-02-11 | End: 2025-02-11

## 2025-02-11 RX ORDER — METHYLPREDNISOLONE ACETATE 80 MG/ML
INJECTION, SUSPENSION INTRA-ARTICULAR; INTRALESIONAL; INTRAMUSCULAR; SOFT TISSUE
Status: COMPLETED
Start: 2025-02-11 | End: 2025-02-11

## 2025-02-11 RX ADMIN — BUPIVACAINE HYDROCHLORIDE 5 MG: 2.5 INJECTION, SOLUTION EPIDURAL; INFILTRATION; INTRACAUDAL at 10:02

## 2025-02-11 RX ADMIN — METHYLPREDNISOLONE ACETATE 80 MG: 80 INJECTION, SUSPENSION INTRA-ARTICULAR; INTRALESIONAL; INTRAMUSCULAR; SOFT TISSUE at 10:02

## 2025-02-11 RX ADMIN — LIDOCAINE HYDROCHLORIDE 50 MG: 10 INJECTION, SOLUTION EPIDURAL; INFILTRATION; INTRACAUDAL; PERINEURAL at 10:02

## 2025-02-11 RX ADMIN — IOHEXOL 5 ML: 350 INJECTION, SOLUTION INTRAVENOUS at 10:02

## 2025-02-11 RX ADMIN — LIDOCAINE HYDROCHLORIDE 30 MG: 10 INJECTION, SOLUTION EPIDURAL; INFILTRATION; INTRACAUDAL; PERINEURAL at 10:02

## 2025-03-10 ENCOUNTER — OFFICE VISIT (OUTPATIENT)
Dept: ORTHOPEDICS | Facility: CLINIC | Age: 65
End: 2025-03-10
Payer: MEDICARE

## 2025-03-10 VITALS — HEIGHT: 69 IN | BODY MASS INDEX: 34.8 KG/M2 | WEIGHT: 235 LBS
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
## 2025-03-10 DIAGNOSIS — M16.12 PRIMARY OSTEOARTHRITIS OF LEFT HIP: Primary | ICD-10-CM

## 2025-03-10 DIAGNOSIS — M70.62 GREATER TROCHANTERIC BURSITIS, LEFT: ICD-10-CM

## 2025-03-10 PROCEDURE — 99213 OFFICE O/P EST LOW 20 MIN: CPT | Mod: S$GLB,,, | Performed by: PHYSICIAN ASSISTANT

## 2025-03-10 PROCEDURE — 99999 PR PBB SHADOW E&M-EST. PATIENT-LVL III: CPT | Mod: PBBFAC,,, | Performed by: PHYSICIAN ASSISTANT
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
## 2025-04-30 DIAGNOSIS — N52.9 ERECTILE DYSFUNCTION: ICD-10-CM

## 2025-04-30 DIAGNOSIS — R32 URINE INCONTINENCE: Primary | ICD-10-CM

## 2025-06-17 ENCOUNTER — RESULTS FOLLOW-UP (OUTPATIENT)
Dept: UROLOGY | Facility: CLINIC | Age: 65
End: 2025-06-17

## 2025-06-17 ENCOUNTER — OFFICE VISIT (OUTPATIENT)
Dept: UROLOGY | Facility: CLINIC | Age: 65
End: 2025-06-17
Payer: MEDICARE

## 2025-06-17 VITALS — BODY MASS INDEX: 34.8 KG/M2 | HEIGHT: 69 IN | WEIGHT: 235 LBS

## 2025-06-17 DIAGNOSIS — N40.1 BPH WITH OBSTRUCTION/LOWER URINARY TRACT SYMPTOMS: Primary | ICD-10-CM

## 2025-06-17 DIAGNOSIS — N13.8 BPH WITH OBSTRUCTION/LOWER URINARY TRACT SYMPTOMS: Primary | ICD-10-CM

## 2025-06-17 DIAGNOSIS — Z12.5 SCREENING FOR PROSTATE CANCER: ICD-10-CM

## 2025-06-17 LAB
AMM URATE CRY URNS QL MICRO: NORMAL
AMORPH CRY URNS QL MICRO: NORMAL
BACTERIA #/AREA URNS HPF: NORMAL /HPF
BILIRUB UR QL STRIP.AUTO: NEGATIVE
CA CARBONATE CRY URNS QL MICRO: NORMAL
CA PHOS CRY URNS QL MICRO: NORMAL
CAOX CRY URNS QL MICRO: NORMAL
CLARITY UR: CLEAR
COLOR UR AUTO: YELLOW
EPITH CASTS #/AREA URNS LPF: 0 /LPF (ref ?–0)
FATTY CASTS #/AREA URNS LPF: 0 /LPF (ref ?–0)
GLUCOSE UR QL STRIP: ABNORMAL
GRAN CASTS #/AREA URNS LPF: 0 /LPF (ref ?–0)
HGB UR QL STRIP: NEGATIVE
HOLD SPECIMEN: NORMAL
HYALINE CASTS #/AREA URNS LPF: 0 /LPF (ref 0–1)
KETONES UR QL STRIP: NEGATIVE
LEUKOCYTE ESTERASE UR QL STRIP: NEGATIVE
MICROSCOPIC COMMENT: NORMAL
NITRITE UR QL STRIP: NEGATIVE
NON-SQ EPI CELLS #/AREA URNS HPF: 0 /HPF
OTHER ELEMENTS URNS MICRO: NORMAL
PH UR STRIP: 6 [PH]
POC RESIDUAL URINE VOLUME: 60 ML (ref 0–100)
PROT UR QL STRIP: NEGATIVE
RBC #/AREA URNS HPF: 2 /HPF (ref 0–4)
RBC CASTS #/AREA URNS LPF: 0 /LPF (ref ?–0)
SP GR UR STRIP: 1.01
SQUAMOUS #/AREA URNS HPF: 0 /HPF
TRI-PHOS CRY URNS QL MICRO: NORMAL
TRICHOMONAS UR QL MICRO: NORMAL /HPF
UNIDENT CRYS URNS QL MICRO: NORMAL
URATE CRY URNS QL MICRO: NORMAL
UROBILINOGEN UR STRIP-ACNC: NEGATIVE EU/DL
WAXY CASTS #/AREA URNS LPF: 0 /LPF (ref ?–0)
WBC #/AREA URNS HPF: 2 /HPF (ref 0–5)
WBC CASTS #/AREA URNS LPF: 0 /LPF (ref ?–0)
WBC CLUMPS URNS QL MICRO: NORMAL
YEAST URNS QL MICRO: NORMAL /HPF

## 2025-06-17 PROCEDURE — 51798 US URINE CAPACITY MEASURE: CPT | Mod: S$GLB,,, | Performed by: NURSE PRACTITIONER

## 2025-06-17 PROCEDURE — 81003 URINALYSIS AUTO W/O SCOPE: CPT | Performed by: NURSE PRACTITIONER

## 2025-06-17 PROCEDURE — 99204 OFFICE O/P NEW MOD 45 MIN: CPT | Mod: S$GLB,,, | Performed by: NURSE PRACTITIONER

## 2025-06-17 PROCEDURE — 99999 PR PBB SHADOW E&M-EST. PATIENT-LVL IV: CPT | Mod: PBBFAC,,, | Performed by: NURSE PRACTITIONER

## 2025-06-17 NOTE — PROGRESS NOTES
Procedure Order to Urology [[id number]]    Electronically signed by: Patricia Plascencia NP on 06/17/25 0900 Status: Active   Ordering user: Patricia Plascencia NP 06/17/25 0900 Authorized by: Patricia Plascencia NP   Ordering mode: Standard   Frequency:  06/17/25 -     Diagnoses  BPH with obstruction/lower urinary tract symptoms [N40.1, N13.8]   Questionnaire    Question Answer   Procedure Cystoscopy Comment - 7/3/25 Tommy  TRUS/Trans Rectal Ultrasound   Facility Name: [facility], Please order Urine POCT without micro . Local sedation (no urine Dr Moncada or Dr marie) /trus

## 2025-06-17 NOTE — H&P (VIEW-ONLY)
"[facility]  Group: [place]/Michelle/Tommy/Antwan  NPs: Shayna Horton/Patricia Plascencia    Note today written by:Patricia Plascencia  Date of Service: 2025    CHIEF COMPLAINT: BPH/LUTS    PRESENTING ILLNESS:    Earle Hoff is a 65 y.o. male who presents for BPH/LUTS. This is his initial clinic visit    25  Pt presents for BPH/LUTS  Taking flomax 0.8 mg x 6 months  AUA SS: Feeling of incomplete Emptying: 3, Frequency: 3, Intermittency: 2, Urgency: 2, Weak Stream: 3, Straining: 3, Sleepin, Total SS: 18 , Quality of Life: 5  + UUI and post void dribbling  LUTS has been ongoing x 1 year  Denies dysuria, gross hematuria or flank pain  PVR 60 ml      History of kidney stones: yes, last passed stone 5 years ago  History of recurrent UTI: denies  Personal or family hx of  malignancy: denies  History of  trauma: denies  Smoking history: some days    Urine cultures:   No results found for: "LABURIN"      REVIEW OF SYSTEMS:  as stated above in hpi    PATIENT HISTORY:    Past Medical History:   Diagnosis Date    Arthritis     Diabetes mellitus     Edema, lower extremity     Gout     Hyperlipidemia     Stasis ulcer of lower extremity, left        Past Surgical History:   Procedure Laterality Date    KNEE ARTHROSCOPY W/ MENISCAL REPAIR      left knee meniscus repair      left shoulder rotator cuff repair      ROBOTIC ARTHROPLASTY, KNEE Right 2022    Procedure: ROBOTIC ARTHROPLASTY, KNEE, TOTAL;  Surgeon: Raghav Ortiz MD;  Location: [facility];  Service: Orthopedics;  Laterality: Right;    SHOULDER ARTHROSCOPY      TONSILLECTOMY           PHYSICAL EXAMINATION:  Constitutional: He is oriented to person, place, and time. He appears well-developed and well-nourished.  He is in no apparent distress.  Abdominal:  He exhibits no distension.  There is no CVA tenderness.   Neurological: He is alert and oriented to person, place, and time.   Psych: Cooperative " with normal affect.      Physical Exam      LABS:    Lab Results   Component Value Date    PSA 0.37 03/19/2020     Lab Results   Component Value Date    CREATININE 1.2 04/27/2022       IMPRESSION:    Encounter Diagnoses   Name Primary?    BPH with obstruction/lower urinary tract symptoms Yes    Screening for prostate cancer        PLAN:  -PSA scheduled. Last PSA was 5 years ago per pt    -Urine sent for UA reflex. Will call with results    -Discussed BPH/LUTS  Continue flomax 0.8 mg as ordered, no refills needed  Discussed further work up with cysto/TRUS for BPH since not controlled with flomax 0.8 mg  Pt agrees to work up  Order placed for 7/3/25 with Dr Sanders for cysto/TRUS in [place]  -Conservative measures to control urgency and frequency including   1. Avoiding/minimizing bladder irritants (see below), especially in afternoon and evening hours  Discussed bladder irritants include coffee (even decaf), tea, alcohol, soda, spicy foods, acidic juices (orange, tomato), vinegar, and artificial sweeteners/sugary beverages.  2. timed voiding - empty on a schedule (approx ~2-3 hours) in spite of need to urinate, to get ahead of urge  3. dont postponing voiding - dont hold it on purpose   4. bowel regimen as distended bowel has extrinsic compressive effect on bladder.   - any or all of the following in any combination, titrate to soft daily bowel movement without pushing or straining  - colace/stool softener capsule - once to twice daily  - miralax - 1 capful daily to start, can increase to 2x daily (or decrease to 1/2 cap daily)  - increase dietary fibery  - fiber supplements, such as metamucil  - prunes, prune juice  5. INCREASE water intake  6. Stop fluids 2 hours before bed, and urinate just before bed    -F/u for cysto/TRUS 7/3/25        I encouraged him or any of his family members to call or email me with questions and/or concerns.      45 minutes of total time spent on the encounter, which includes face to face  time and non-face to face time preparing to see the patient (eg, review of tests), Obtaining and/or reviewing separately obtained history, Documenting clinical information in the electronic or other health record, Independently interpreting results (not separately reported) and communicating results to the patient/family/caregiver, or Care coordination (not separately reported).

## 2025-06-17 NOTE — PROGRESS NOTES
"[facility]  Group: Eveertt/Michelle/Tommy/Antwan  NPs: Shayna Horton/Patricia Plascencia    Note today written by:Patricia Plascencia  Date of Service: 2025    CHIEF COMPLAINT: BPH/LUTS    PRESENTING ILLNESS:    Earle Hoff is a 65 y.o. male who presents for BPH/LUTS. This is his initial clinic visit    25  Pt presents for BPH/LUTS  Taking flomax 0.8 mg x 6 months  AUA SS: Feeling of incomplete Emptying: 3, Frequency: 3, Intermittency: 2, Urgency: 2, Weak Stream: 3, Straining: 3, Sleepin, Total SS: 18 , Quality of Life: 5  + UUI and post void dribbling  LUTS has been ongoing x 1 year  Denies dysuria, gross hematuria or flank pain  PVR 60 ml      History of kidney stones: yes, last passed stone 5 years ago  History of recurrent UTI: denies  Personal or family hx of  malignancy: denies  History of  trauma: denies  Smoking history: some days    Urine cultures:   No results found for: "LABURIN"      REVIEW OF SYSTEMS:  as stated above in hpi    PATIENT HISTORY:    Past Medical History:   Diagnosis Date    Arthritis     Diabetes mellitus     Edema, lower extremity     Gout     Hyperlipidemia     Stasis ulcer of lower extremity, left        Past Surgical History:   Procedure Laterality Date    KNEE ARTHROSCOPY W/ MENISCAL REPAIR      left knee meniscus repair      left shoulder rotator cuff repair      ROBOTIC ARTHROPLASTY, KNEE Right 2022    Procedure: ROBOTIC ARTHROPLASTY, KNEE, TOTAL;  Surgeon: Raghav Ortiz MD;  Location: [facility];  Service: Orthopedics;  Laterality: Right;    SHOULDER ARTHROSCOPY      TONSILLECTOMY           PHYSICAL EXAMINATION:  Constitutional: He is oriented to person, place, and time. He appears well-developed and well-nourished.  He is in no apparent distress.  Abdominal:  He exhibits no distension.  There is no CVA tenderness.   Neurological: He is alert and oriented to person, place, and time.   Psych: Cooperative " with normal affect.      Physical Exam      LABS:    Lab Results   Component Value Date    PSA 0.37 03/19/2020     Lab Results   Component Value Date    CREATININE 1.2 04/27/2022       IMPRESSION:    Encounter Diagnoses   Name Primary?    BPH with obstruction/lower urinary tract symptoms Yes    Screening for prostate cancer        PLAN:  -PSA scheduled. Last PSA was 5 years ago per pt    -Urine sent for UA reflex. Will call with results    -Discussed BPH/LUTS  Continue flomax 0.8 mg as ordered, no refills needed  Discussed further work up with cysto/TRUS for BPH since not controlled with flomax 0.8 mg  Pt agrees to work up  Order placed for 7/3/25 with Dr Sanders for cysto/TRUS in [place]  -Conservative measures to control urgency and frequency including   1. Avoiding/minimizing bladder irritants (see below), especially in afternoon and evening hours  Discussed bladder irritants include coffee (even decaf), tea, alcohol, soda, spicy foods, acidic juices (orange, tomato), vinegar, and artificial sweeteners/sugary beverages.  2. timed voiding - empty on a schedule (approx ~2-3 hours) in spite of need to urinate, to get ahead of urge  3. dont postponing voiding - dont hold it on purpose   4. bowel regimen as distended bowel has extrinsic compressive effect on bladder.   - any or all of the following in any combination, titrate to soft daily bowel movement without pushing or straining  - colace/stool softener capsule - once to twice daily  - miralax - 1 capful daily to start, can increase to 2x daily (or decrease to 1/2 cap daily)  - increase dietary fibery  - fiber supplements, such as metamucil  - prunes, prune juice  5. INCREASE water intake  6. Stop fluids 2 hours before bed, and urinate just before bed    -F/u for cysto/TRUS 7/3/25        I encouraged him or any of his family members to call or email me with questions and/or concerns.      45 minutes of total time spent on the encounter, which includes face to face  time and non-face to face time preparing to see the patient (eg, review of tests), Obtaining and/or reviewing separately obtained history, Documenting clinical information in the electronic or other health record, Independently interpreting results (not separately reported) and communicating results to the patient/family/caregiver, or Care coordination (not separately reported).

## 2025-06-18 ENCOUNTER — HOSPITAL ENCOUNTER (OUTPATIENT)
Dept: RADIOLOGY | Facility: HOSPITAL | Age: 65
Discharge: HOME | End: 2025-06-18
Attending: FAMILY MEDICINE
Payer: MEDICARE

## 2025-06-18 DIAGNOSIS — M54.9 BACK PAIN: Primary | ICD-10-CM

## 2025-06-18 DIAGNOSIS — M54.9 BACK PAIN: ICD-10-CM
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
## 2025-06-18 PROCEDURE — 72050 X-RAY EXAM NECK SPINE 4/5VWS: CPT | Mod: 26,,, | Performed by: RADIOLOGY

## 2025-06-18 PROCEDURE — 72050 X-RAY EXAM NECK SPINE 4/5VWS: CPT | Mod: TC

## 2025-06-18 PROCEDURE — 72070 X-RAY EXAM THORAC SPINE 2VWS: CPT | Mod: TC

## 2025-06-18 PROCEDURE — 72070 X-RAY EXAM THORAC SPINE 2VWS: CPT | Mod: 26,,, | Performed by: RADIOLOGY

## 2025-06-20 ENCOUNTER — LAB VISIT (OUTPATIENT)
Dept: LAB | Facility: HOSPITAL | Age: 65
End: 2025-06-20
Attending: NURSE PRACTITIONER
Payer: MEDICARE

## 2025-06-20 DIAGNOSIS — Z12.5 SCREENING FOR PROSTATE CANCER: ICD-10-CM

## 2025-06-20 LAB — PSA SERPL-MCNC: 0.46 NG/ML

## 2025-06-20 PROCEDURE — 84153 ASSAY OF PSA TOTAL: CPT

## 2025-06-20 PROCEDURE — 36415 COLL VENOUS BLD VENIPUNCTURE: CPT

## 2025-07-03 ENCOUNTER — HOSPITAL ENCOUNTER (OUTPATIENT)
Facility: HOSPITAL | Age: 65
Discharge: HOME | End: 2025-07-03
Attending: STUDENT IN AN ORGANIZED HEALTH CARE EDUCATION/TRAINING PROGRAM | Admitting: STUDENT IN AN ORGANIZED HEALTH CARE EDUCATION/TRAINING PROGRAM
Payer: MEDICARE

## 2025-07-03 DIAGNOSIS — N40.1 BPH WITH OBSTRUCTION/LOWER URINARY TRACT SYMPTOMS: Primary | ICD-10-CM

## 2025-07-03 DIAGNOSIS — N13.8 BPH WITH OBSTRUCTION/LOWER URINARY TRACT SYMPTOMS: Primary | ICD-10-CM

## 2025-07-03 PROCEDURE — 76872 US TRANSRECTAL: CPT | Performed by: STUDENT IN AN ORGANIZED HEALTH CARE EDUCATION/TRAINING PROGRAM

## 2025-07-03 PROCEDURE — 25000003 PHARM REV CODE 250: Performed by: STUDENT IN AN ORGANIZED HEALTH CARE EDUCATION/TRAINING PROGRAM

## 2025-07-03 PROCEDURE — 52000 CYSTOURETHROSCOPY: CPT | Mod: ,,, | Performed by: STUDENT IN AN ORGANIZED HEALTH CARE EDUCATION/TRAINING PROGRAM

## 2025-07-03 PROCEDURE — A4217 STERILE WATER/SALINE, 500 ML: HCPCS | Performed by: STUDENT IN AN ORGANIZED HEALTH CARE EDUCATION/TRAINING PROGRAM

## 2025-07-03 PROCEDURE — 76872 US TRANSRECTAL: CPT | Mod: 26,,, | Performed by: STUDENT IN AN ORGANIZED HEALTH CARE EDUCATION/TRAINING PROGRAM

## 2025-07-03 PROCEDURE — 52000 CYSTOURETHROSCOPY: CPT | Performed by: STUDENT IN AN ORGANIZED HEALTH CARE EDUCATION/TRAINING PROGRAM

## 2025-07-03 RX ORDER — WATER 1 ML/ML
INJECTION IRRIGATION
Status: DISCONTINUED | OUTPATIENT
Start: 2025-07-03 | End: 2025-07-03 | Stop reason: HOSPADM

## 2025-07-03 RX ORDER — SULFAMETHOXAZOLE AND TRIMETHOPRIM 800; 160 MG/1; MG/1
1 TABLET ORAL 2 TIMES DAILY
Qty: 2 TABLET | Refills: 0 | Status: SHIPPED | OUTPATIENT
Start: 2025-07-03 | End: 2025-07-04

## 2025-07-03 RX ORDER — SOLIFENACIN SUCCINATE 5 MG/1
10 TABLET, FILM COATED ORAL DAILY
Qty: 30 TABLET | Refills: 11 | Status: SHIPPED | OUTPATIENT
Start: 2025-07-03 | End: 2026-07-03

## 2025-07-03 RX ORDER — LIDOCAINE HYDROCHLORIDE 20 MG/ML
JELLY TOPICAL
Status: DISCONTINUED | OUTPATIENT
Start: 2025-07-03 | End: 2025-07-03 | Stop reason: HOSPADM

## 2025-07-03 NOTE — DISCHARGE SUMMARY
[facility]  Discharge Note  Short Stay    Admit Date: 7/3/2025    Discharge Date and Time: 07/03/2025 9:04 AM      Attending Physician: Katia Sanders MD     Discharge Provider: Katia Sanders    Diagnoses:  Active Hospital Problems    Diagnosis  POA    *BPH with obstruction/lower urinary tract symptoms [N40.1, N13.8]  Yes      Resolved Hospital Problems   No resolved problems to display.       Discharged Condition: good    Hospital Course: Patient was admitted for outpatient procedure and tolerated the procedure well with no complications. The patient was discharged home in good condition on the same day.       Final Diagnoses: Same as principal problem.    Disposition: Home or Self Care    Follow up/Patient Instructions:    Medications:  Reconciled Home Medications:   Current Discharge Medication List        START taking these medications    Details   solifenacin (VESICARE) 5 MG tablet Take 2 tablets (10 mg total) by mouth once daily.  Qty: 30 tablet, Refills: 11      sulfamethoxazole-trimethoprim 800-160mg (BACTRIM DS) 800-160 mg Tab Take 1 tablet by mouth 2 (two) times daily. for 1 day  Qty: 2 tablet, Refills: 0           CONTINUE these medications which have NOT CHANGED    Details   atorvastatin (LIPITOR) 20 MG tablet 20 mg.      ezetimibe (ZETIA) 10 mg tablet       folic acid (FOLVITE) 1 MG tablet       furosemide (LASIX) 20 MG tablet Take 1 tablet (20 mg total) by mouth daily as needed (Leg swelling).    Comments: Do not take for five days then resume taking as you usually would      pregabalin (LYRICA) 75 MG capsule Take 1 capsule (75 mg total) by mouth 3 (three) times daily.  Qty: 90 capsule, Refills: 2      SYNJARDY XR 12.5-1,000 mg TBph 1 tab, Oral, BID, # 180 tab, 3 Refill(s), Pharmacy: [facility], 175, cm, 03/26/24 8:11:00 CDT, Height/Length Measured, 121.5, kg, 03/26/24 8:11:00 CDT, Weight Dosing      aspirin 81 MG Chew Take 1 tablet (81 mg total) by mouth 2  (two) times a day. for 21 days  Qty: 42 tablet, Refills: 0    Comments: Take twice a day for 21 days then resume taking once a day      EUCRISA 2 % Oint Apply 8 g topically once daily.  Qty: 60 g, Refills: 4      glimepiride (AMARYL) 4 MG tablet       hydrOXYzine HCl (ATARAX) 25 MG tablet       tamsulosin HCl (FLOMAX ORAL) Take by mouth.      TRUE METRIX GLUCOSE TEST STRIP Strp       TRUEPLUS LANCETS 33 gauge Misc            Discharge Procedure Orders   Call MD for:  temperature >100.4     Activity as tolerated      Follow-up Information       Katia Sanders MD Follow up in 3 month(s).    Specialty: Urology  Why: assess symptoms  Contact information:  [address] [place] [address] [address]IVE  [address]  [facility] [address] [phone]                               Katia Sanders MD  Urology Department

## 2025-07-03 NOTE — OP NOTE
[facility]  Operative Note    Date: 07/03/2025    Pre-Op Diagnosis:   - BPH with UUI    Post-Op Diagnosis: same    Procedure(s) Performed:   1.  Cystoscopy   2.  TRUS    Specimen(s): none    Staff Surgeon: Katia Sanders MD    Anesthesia: Local anesthesia topical 2% lidocaine gel    Indications: Earle Hoff is a 65 y.o. male with hx of BPH and UUI that has been refractory to medical therapy.     Findings:   - prostate with minor bilobar obstruction, no high bladder neck or median lobe  - bladder appeared healthy with no significant trabeculations  - no tumors or lesions concerning for malignancy  - prostate measured 39.6 g    Estimated Blood Loss: min    Procedure in Detail:  After risks, benefits and possible complications of cystoscopy were explained, the patient elected to undergo the procedure and informed consent was obtained. All questions were answered in the javon-operative area. The patient was transferred to the cystoscopy suite and placed in the dorsal lithotomy position and prepped and draped in the usual sterile fashion.  Time out was performed.     A flexible cystoscope was introduced into the bladder per urethra. This passed easily.  The entire urethra was visualized which showed no masses or strictures.  The right and left ureteral orifices were identified in the normal anatomic position and were seen effluxing clear urine.  Formal cystoscopy was performed which revealed no masses or lesions suspicious for malignancy, no trabeculations, no bladder stones and no bladder diverticuli.       Patient was placed in the lateral decubitus position and transrectal ultrasound was performed in standard fashion showing a prostate volume of 39.6 g.    The patient tolerated the procedure well and was transferred to recovery in stable condition.    Disposition:  He does take Synjardy for his DM. This may be contributing to his urgency and urge incontinence.  Advised that he discussed with  his endocrinologist doing a trial of a different medication to see if this improves his symptoms.  In the meantime we will also start him on VESIcare 10 mg help with urgency as his prostate does not appear to be causing a significant obstruction.  If this is not effective then we can discuss outlet procedure.  We will have him follow up in 3 months.    Katia Sanders MD

## 2025-07-03 NOTE — PLAN OF CARE
Pt DC with spouse, Dr Sanders spoke with pt and pt's wife in PACU prior to DC about meds and plan of care.

## 2025-07-07 VITALS
TEMPERATURE: 98 F | BODY MASS INDEX: 34.8 KG/M2 | WEIGHT: 235 LBS | DIASTOLIC BLOOD PRESSURE: 70 MMHG | HEIGHT: 69 IN | SYSTOLIC BLOOD PRESSURE: 128 MMHG | HEART RATE: 58 BPM | RESPIRATION RATE: 18 BRPM | OXYGEN SATURATION: 97 %

## 2025-07-11 ENCOUNTER — HOSPITAL ENCOUNTER (OUTPATIENT)
Dept: RADIOLOGY | Facility: HOSPITAL | Age: 65
Discharge: HOME | End: 2025-07-11
Attending: FAMILY MEDICINE
Payer: MEDICARE

## 2025-07-11 ENCOUNTER — OFFICE VISIT (OUTPATIENT)
Dept: FAMILY MEDICINE | Facility: CLINIC | Age: 65
End: 2025-07-11
Payer: MEDICARE

## 2025-07-11 VITALS
HEIGHT: 69 IN | BODY MASS INDEX: 36.58 KG/M2 | OXYGEN SATURATION: 99 % | RESPIRATION RATE: 18 BRPM | WEIGHT: 247 LBS | DIASTOLIC BLOOD PRESSURE: 68 MMHG | HEART RATE: 69 BPM | SYSTOLIC BLOOD PRESSURE: 126 MMHG

## 2025-07-11 DIAGNOSIS — E11.65 TYPE 2 DIABETES MELLITUS WITH HYPERGLYCEMIA, WITHOUT LONG-TERM CURRENT USE OF INSULIN: Primary | ICD-10-CM

## 2025-07-11 DIAGNOSIS — Z87.891 PERSONAL HISTORY OF TOBACCO USE, PRESENTING HAZARDS TO HEALTH: ICD-10-CM

## 2025-07-11 DIAGNOSIS — E66.812 OBESITY, CLASS II, BMI 35-39.9: ICD-10-CM

## 2025-07-11 DIAGNOSIS — N18.31 STAGE 3A CHRONIC KIDNEY DISEASE: ICD-10-CM

## 2025-07-11 DIAGNOSIS — E78.2 MIXED HYPERLIPIDEMIA: ICD-10-CM

## 2025-07-11 PROBLEM — L25.9 CONTACT DERMATITIS: Status: RESOLVED | Noted: 2019-01-02 | Resolved: 2025-07-11

## 2025-07-11 PROCEDURE — G0296 VISIT TO DETERM LDCT ELIG: HCPCS | Mod: S$GLB,,, | Performed by: FAMILY MEDICINE

## 2025-07-11 PROCEDURE — 99407 BEHAV CHNG SMOKING > 10 MIN: CPT | Mod: S$GLB,,, | Performed by: FAMILY MEDICINE

## 2025-07-11 PROCEDURE — 71271 CT THORAX LUNG CANCER SCR C-: CPT | Mod: 26,,, | Performed by: RADIOLOGY

## 2025-07-11 PROCEDURE — 99204 OFFICE O/P NEW MOD 45 MIN: CPT | Mod: 25,S$GLB,, | Performed by: FAMILY MEDICINE

## 2025-07-11 PROCEDURE — 99999 PR PBB SHADOW E&M-EST. PATIENT-LVL V: CPT | Mod: PBBFAC,,, | Performed by: FAMILY MEDICINE

## 2025-07-11 PROCEDURE — 71271 CT THORAX LUNG CANCER SCR C-: CPT | Mod: TC

## 2025-07-11 PROCEDURE — G2211 COMPLEX E/M VISIT ADD ON: HCPCS | Mod: S$GLB,,, | Performed by: FAMILY MEDICINE

## 2025-07-11 NOTE — PROGRESS NOTES
[facility] 2nd Floor [facility]      Subjective         Chief Complaint   Patient presents with    Establish Care     Pt states he is looking to establish care with MD Moncada since his PCP Flor is retiring.      Pleasant 65-year-old  male presents to establish/PCP.  Patient has known HLD, HEENT, T2 dm non-insulin-requiring, BPH, RAFAEL with CPAP, nicotine dependence times 49 years.  Follows with Urologist Dr. Sanders, Cardiologist Dr. Rodriguez, Pulmonology for CPAP/supplies, Dr. Charles optometry.  Takes medications as prescribed.  Checks blood sugars at home occasionally.  Fasting a.m. blood sugars typically less than 110 mg/dL.  Last A1c unknown.  Mood stable: PHQ 2: 1    Past Medical History:   Diagnosis Date    Arthritis     Contact dermatitis 01/02/2019    Diabetes mellitus     Edema, lower extremity     Gout     Hyperlipidemia     Stasis ulcer of lower extremity, left     Status post total right knee replacement 04/26/2022        Past Surgical History:   Procedure Laterality Date    CYSTOSCOPY N/A 7/3/2025    Procedure: CYSTOSCOPY;  Surgeon: Katia Sanders MD;  Location: [facility] OR;  Service: Urology;  Laterality: N/A;    KNEE ARTHROSCOPY W/ MENISCAL REPAIR      left knee meniscus repair      left shoulder rotator cuff repair      ROBOTIC ARTHROPLASTY, KNEE Right 4/25/2022    Procedure: ROBOTIC ARTHROPLASTY, KNEE, TOTAL;  Surgeon: Raghav Ortiz MD;  Location: [facility] OR;  Service: Orthopedics;  Laterality: Right;    SHOULDER ARTHROSCOPY      TONSILLECTOMY      TRANSRECTAL ULTRASOUND EXAMINATION N/A 7/3/2025    Procedure: ULTRASOUND, RECTAL APPROACH;  Surgeon: Katia Sanders MD;  Location: [facility] OR;  Service: Urology;  Laterality: N/A;        Review of patient's allergies indicates:   Allergen Reactions    Pcn [penicillins]         Review of Systems   Constitutional: Negative.    HENT: Negative.     Eyes: Negative.    Respiratory: Negative.     Cardiovascular: Negative.   "  Gastrointestinal: Negative.    Genitourinary: Negative.    Musculoskeletal: Negative.    Skin: Negative.    Neurological: Negative.    Endo/Heme/Allergies: Negative.    Psychiatric/Behavioral: Negative.                 Objective      Vitals:    07/11/25 1428   BP: 126/68   BP Location: Left arm   Patient Position: Sitting   Pulse: 69   Resp: 18   SpO2: 99%   Weight: 112 kg (247 lb)   Height: 5' 9.02" (1.753 m)        Physical Exam  Vitals reviewed.   Constitutional:       Appearance: Normal appearance. He is not ill-appearing.   HENT:      Head: Normocephalic and atraumatic.      Right Ear: Tympanic membrane and ear canal normal.      Left Ear: Tympanic membrane and ear canal normal.      Ears:      Comments: Nonobstructive cerumen bilaterally     Mouth/Throat:      Mouth: Mucous membranes are moist.      Pharynx: Oropharynx is clear.   Eyes:      Extraocular Movements: Extraocular movements intact.      Conjunctiva/sclera: Conjunctivae normal.      Comments: No scleral icterus,   Cardiovascular:      Rate and Rhythm: Normal rate and regular rhythm.      Pulses: Normal pulses.   Pulmonary:      Effort: Pulmonary effort is normal.      Breath sounds: Normal breath sounds.      Comments: Scattered rhonchi bases bilaterally  Abdominal:      General: Abdomen is flat.      Palpations: Abdomen is soft.      Tenderness: There is no abdominal tenderness.   Musculoskeletal:         General: Normal range of motion.      Cervical back: Neck supple. No tenderness.      Comments: Trace edema ankles bilateral   Lymphadenopathy:      Cervical: No cervical adenopathy.   Skin:     General: Skin is warm and dry.      Capillary Refill: Capillary refill takes less than 2 seconds.   Neurological:      General: No focal deficit present.      Mental Status: He is alert and oriented to person, place, and time.   Psychiatric:         Mood and Affect: Mood normal.         Lab Results   Component Value Date    TSH 0.878 12/08/2020      "   Lab Results   Component Value Date    HGBA1C 6.0 (H) 07/11/2025      Lab Results   Component Value Date    HGBA1C 6.0 (H) 07/11/2025     CMP  Sodium   Date Value Ref Range Status   04/27/2022 140 136 - 145 mmol/L Final   04/26/2022 138 136 - 145 mmol/L Final   04/26/2022 135 (L) 136 - 145 mmol/L Final     Potassium   Date Value Ref Range Status   04/27/2022 4.5 3.5 - 5.1 mmol/L Final   04/26/2022 4.6 3.5 - 5.1 mmol/L Final   04/26/2022 5.1 3.5 - 5.1 mmol/L Final     Chloride   Date Value Ref Range Status   04/27/2022 109 95 - 110 mmol/L Final   04/26/2022 105 95 - 110 mmol/L Final   04/26/2022 103 95 - 110 mmol/L Final     CO2   Date Value Ref Range Status   04/27/2022 21 (L) 23 - 29 mmol/L Final   04/26/2022 23 23 - 29 mmol/L Final   04/26/2022 20 (L) 23 - 29 mmol/L Final     Glucose   Date Value Ref Range Status   04/27/2022 150 (H) 70 - 110 mg/dL Final   04/26/2022 263 (H) 70 - 110 mg/dL Final   04/26/2022 319 (H) 70 - 110 mg/dL Final     BUN   Date Value Ref Range Status   04/27/2022 21 8 - 23 mg/dL Final   04/26/2022 20 8 - 23 mg/dL Final   04/26/2022 21 8 - 23 mg/dL Final     Creatinine   Date Value Ref Range Status   04/27/2022 1.2 0.5 - 1.4 mg/dL Final   04/26/2022 1.5 (H) 0.5 - 1.4 mg/dL Final   04/26/2022 1.5 (H) 0.5 - 1.4 mg/dL Final     Calcium   Date Value Ref Range Status   04/27/2022 8.4 (L) 8.7 - 10.5 mg/dL Final   04/26/2022 9.0 8.7 - 10.5 mg/dL Final   04/26/2022 8.8 8.7 - 10.5 mg/dL Final     Total Protein   Date Value Ref Range Status   04/11/2022 7.0 6.0 - 8.4 g/dL Final   12/08/2020 6.9 6.0 - 8.4 g/dL Final   03/19/2020 7.6 6.0 - 8.4 g/dL Final     Albumin   Date Value Ref Range Status   04/11/2022 3.5 3.5 - 5.2 g/dL Final   12/08/2020 3.7 3.5 - 5.2 g/dL Final   03/19/2020 3.8 3.5 - 5.2 g/dL Final     Total Bilirubin   Date Value Ref Range Status   04/11/2022 0.8 0.1 - 1.0 mg/dL Final     Comment:     For infants and newborns, interpretation of results should be based  on gestational age,  weight and in agreement with clinical  observations.    Premature Infant recommended reference ranges:  Up to 24 hours.............<8.0 mg/dL  Up to 48 hours............<12.0 mg/dL  3-5 days..................<15.0 mg/dL  6-29 days.................<15.0 mg/dL     12/08/2020 1.1 (H) 0.1 - 1.0 mg/dL Final     Comment:     For infants and newborns, interpretation of results should be based  on gestational age, weight and in agreement with clinical  observations.  Premature Infant recommended reference ranges:  Up to 24 hours.............<8.0 mg/dL  Up to 48 hours............<12.0 mg/dL  3-5 days..................<15.0 mg/dL  6-29 days.................<15.0 mg/dL     03/19/2020 0.8 0.1 - 1.0 mg/dL Final     Comment:     For infants and newborns, interpretation of results should be based  on gestational age, weight and in agreement with clinical  observations.  Premature Infant recommended reference ranges:  Up to 24 hours.............<8.0 mg/dL  Up to 48 hours............<12.0 mg/dL  3-5 days..................<15.0 mg/dL  6-29 days.................<15.0 mg/dL       Alkaline Phosphatase   Date Value Ref Range Status   04/11/2022 88 55 - 135 U/L Final   12/08/2020 94 55 - 135 U/L Final   03/19/2020 72 55 - 135 U/L Final     AST   Date Value Ref Range Status   04/11/2022 27 10 - 40 U/L Final   12/08/2020 24 10 - 40 U/L Final   03/19/2020 27 10 - 40 U/L Final     ALT   Date Value Ref Range Status   04/11/2022 30 10 - 44 U/L Final   12/08/2020 36 10 - 44 U/L Final   03/19/2020 36 10 - 44 U/L Final     Anion Gap   Date Value Ref Range Status   04/27/2022 10 8 - 16 mmol/L Final   04/26/2022 10 8 - 16 mmol/L Final   04/26/2022 12 8 - 16 mmol/L Final     eGFR if    Date Value Ref Range Status   04/27/2022 >60 >60 mL/min/1.73 m^2 Final   04/26/2022 57 (A) >60 mL/min/1.73 m^2 Final   04/26/2022 57 (A) >60 mL/min/1.73 m^2 Final     eGFR if non    Date Value Ref Range Status   04/27/2022 >60 >60  "mL/min/1.73 m^2 Final     Comment:     Calculation used to obtain the estimated glomerular filtration  rate (eGFR) is the CKD-EPI equation.      04/26/2022 49 (A) >60 mL/min/1.73 m^2 Final     Comment:     Calculation used to obtain the estimated glomerular filtration  rate (eGFR) is the CKD-EPI equation.      04/26/2022 49 (A) >60 mL/min/1.73 m^2 Final     Comment:     Calculation used to obtain the estimated glomerular filtration  rate (eGFR) is the CKD-EPI equation.       No results found for: "CHOL"  No results found for: "HDL"  No results found for: "LDLCALC"  No results found for: "TRIG"  No results found for: "CHOLHDL"       Medication List with Changes/Refills   Current Medications    ASPIRIN 81 MG CHEW    Take 1 tablet (81 mg total) by mouth 2 (two) times a day. for 21 days    EUCRISA 2 % OINT    Apply 8 g topically once daily.    EZETIMIBE (ZETIA) 10 MG TABLET        FOLIC ACID (FOLVITE) 1 MG TABLET        FUROSEMIDE (LASIX) 20 MG TABLET    Take 1 tablet (20 mg total) by mouth daily as needed (Leg swelling).    GLIMEPIRIDE (AMARYL) 4 MG TABLET        HYDROXYZINE HCL (ATARAX) 25 MG TABLET        PREGABALIN (LYRICA) 75 MG CAPSULE    Take 1 capsule (75 mg total) by mouth 3 (three) times daily.    SOLIFENACIN (VESICARE) 5 MG TABLET    Take 2 tablets (10 mg total) by mouth once daily.    SYNJARDY XR 12.5-1,000 MG TBPH    1 tab, Oral, BID, # 180 tab, 3 Refill(s), Pharmacy: [facility], 175, cm, 03/26/24 8:11:00 CDT, Height/Length Measured, 121.5, kg, 03/26/24 8:11:00 CDT, Weight Dosing    TAMSULOSIN HCL (FLOMAX ORAL)    Take by mouth.    TRUE METRIX GLUCOSE TEST STRIP STRP        TRUEPLUS LANCETS 33 GAUGE MISC       Changed and/or Refilled Medications    Modified Medication Previous Medication    ATORVASTATIN (LIPITOR) 20 MG TABLET atorvastatin (LIPITOR) 20 MG tablet       Take 1 tablet (20 mg total) by mouth every evening.    20 mg.           Assessment & Plan     Assessment & Plan  Type 2 " diabetes mellitus with hyperglycemia, without long-term current use of insulin  We will assess A1c level today.  Continue home monitoring glucose target blood sugar less than 120 mg/dL, fasting.  Target A1c less than 8.0%.  Recommended dilated eye exam annually along with foot exam.    Orders:    Hemoglobin A1C; Future    Mixed hyperlipidemia  We will need fasting lipid assessment.  Continue Atorvastatin 20 mg q.h.s..  A low-cholesterol diet reviewed.         Personal history of tobacco use, presenting hazards to health  Explored with the patient, various  and common methodologies, both pharmacologic and non pharmacologic, to quit smoking.  Barriers and benefits of cessation  were reviewed with the patient..  Discussed with the patient previous attempts to quit smoking.  Explored patient's desire/need to quit, common cessation techniques, and resources.  Patient competent and understands methodologies to quit smoking.  Patient encouraged to set a quit date and make an appointment with this provider approximately 3 weeks prior to that quit date.  Common risks including, but not limited to, continuing to smoke were reviewed including:  lung cancer, emphysema, chronic bronchitis, pneumonia, vascular disease, etc..  At this time the patient elects to contemplate quitting and contact this office for an appointment when ready or have additional questions.  Printed smoking cessation information handed to the patient.  An opportunity to ask questions was given, all questions answered, patient understands and elects to proceed. Patient referred to 1-800-QUIT-NOW  for additional resources.  Total time spent in counselin minutes       Orders:    CT Chest Lung Screening Low Dose; Future    Stage 3a chronic kidney disease  Per historical review.  We will need aggressive control of HTN, HDL, blood sugar.  Encouraged weight loss, increase water intake.  We will reassess BUN/creatinine, urinary a/c ratio, EGFR next visit.          Obesity, Class II, BMI 35-39.9  Encouraged weight loss.  Health risks associated with obesity reviewed.  Mediterranean diet reviewed.  Recommended Mediterranean Diet  Eating Heart-Healthy Food: Using the DASH Plan  Eating for your heart doesn't have to be hard or boring. You just need to know how to make healthier choices. The DASH eating plan has been developed to help you do just that. DASH stands for Dietary Approaches to Stop Hypertension. It is a plan that has been proven to be healthier for your heart and to lower your risk for high blood pressure. It can also help lower your risk for cancer, heart disease, osteoporosis, and diabetes.  Choosing from Each Food Group  Choose foods from each of the food groups below each day. Try to get the recommended number of servings for each food group. The serving numbers are based on a diet of 2,000 calories a day. Talk to your doctor if you're unsure about your calorie needs.  Grains   Servings: 7-8 a day  A serving is:  1 slice bread  1 ounce dry cereal  half a cup cooked rice or pasta  Best choices: Whole grains and any grains high in fiber.  Vegetables   Servings: 4-5 a day  A serving is:  1 cup raw leafy vegetable  Half a cup cooked vegetable  Three-quarter cup vegetable juice  Best choices: Fresh or frozen vegetable prepared without too much added salt or fat.    Fruits   Servings: 4-5 a day  A serving is:  Three-quarter cup fruit juice  1 medium fruit  One-quarter cup dried fruit  One-half cup fresh, frozen, or canned fruit  Best choices: A variety of fresh fruits of different colors. Whole fruits are a much better choice than fruit juices.  Low-fat or Fat Free Dairy   Servings: 2-3 a day  A serving is:  8 ounces milk  1 cup yogurt  One and a half ounces cheese  Best choices: Skim or 1% milk, low-fat or fat free yogurt or buttermilk, and low-fat cheeses.       Meat, Poultry, Fish   Servings: 2 or fewer a day  A serving is:  3 ounces cooked meat, poultry, or  fish  Best choices: Lean meats and fish. Trim away visible fat. Broil, roast, or boil instead of frying. Remove skin from poultry before eating.  Nuts, Seeds, Beans   Servings: 4-5 a week  A serving is:  One third cup nuts (or one and a half ounces)  2 tablespoons sunflower seeds  Half a cup cooked beans  Best choices: Dry roasted nuts with no salt added, lentils, kidney beans, garbanzo beans, and whole ontiveros beans.    Fats and Oils   Servings: 2 a day  A serving is:  1 teaspoon vegetable oil  1 teaspoon soft margarine  1 tablespoon low-fat mayonnaise  1 teaspoon regular mayonnaise  2 tablespoons light salad dressing  1 tablespoon regular salad dressing  Best choices: Monounsaturated and polyunsaturated fats such as olive, canola, or safflower oil.  Sweets   Servings: 5 a week or fewer  A serving is:  1 tablespoon sugar, maple syrup, or honey  1 tablespoon jam or jelly  1 half-ounce jelly beans (about 15)  8 ounces lemonade  Best choices: Dried fruit can be a satisfying sweet. Choose low-fat sweets when possible. And watch your serving sizes!       Aerobic Exercise for a Healthy Heart  Exercise is a lot more than an energy booster and a stress reliever. It also strengthens your heart muscle, lowers your blood pressure and blood cholesterol, and burns calories.          All questions answered.  Medical histories reviewed.  Patient exam complete.  Follow up October 2025 blood work included.  Patient understands and elects to proceed.     Tom Moncada MD  [facility] 2nd Floor [facility]  [address] [place] [address],[id number]  [phone]

## 2025-07-14 ENCOUNTER — PATIENT OUTREACH (OUTPATIENT)
Dept: ADMINISTRATIVE | Facility: HOSPITAL | Age: 65
End: 2025-07-14
Payer: MEDICARE

## 2025-07-14 PROBLEM — Z96.651 STATUS POST TOTAL RIGHT KNEE REPLACEMENT: Status: RESOLVED | Noted: 2022-04-26 | Resolved: 2025-07-14

## 2025-07-14 PROBLEM — E78.2 MIXED HYPERLIPIDEMIA: Status: ACTIVE | Noted: 2025-07-14

## 2025-07-14 PROBLEM — Z87.891 PERSONAL HISTORY OF TOBACCO USE, PRESENTING HAZARDS TO HEALTH: Status: ACTIVE | Noted: 2025-07-14

## 2025-07-14 PROBLEM — N18.31 STAGE 3A CHRONIC KIDNEY DISEASE: Status: ACTIVE | Noted: 2025-07-14

## 2025-07-14 PROBLEM — E11.65 TYPE 2 DIABETES MELLITUS WITH HYPERGLYCEMIA, WITHOUT LONG-TERM CURRENT USE OF INSULIN: Status: ACTIVE | Noted: 2025-07-14

## 2025-07-14 PROBLEM — R26.2 DIFFICULTY WALKING: Status: RESOLVED | Noted: 2022-05-17 | Resolved: 2025-07-14

## 2025-07-14 RX ORDER — ATORVASTATIN CALCIUM 20 MG/1
20 TABLET, FILM COATED ORAL NIGHTLY
Qty: 90 TABLET | Refills: 1 | Status: SHIPPED | OUTPATIENT
Start: 2025-07-14 | End: 2028-11-13

## 2025-07-14 NOTE — ASSESSMENT & PLAN NOTE
We will need fasting lipid assessment.  Continue Atorvastatin 20 mg q.h.s..  A low-cholesterol diet reviewed.

## 2025-07-14 NOTE — ASSESSMENT & PLAN NOTE
Explored with the patient, various  and common methodologies, both pharmacologic and non pharmacologic, to quit smoking.  Barriers and benefits of cessation  were reviewed with the patient..  Discussed with the patient previous attempts to quit smoking.  Explored patient's desire/need to quit, common cessation techniques, and resources.  Patient competent and understands methodologies to quit smoking.  Patient encouraged to set a quit date and make an appointment with this provider approximately 3 weeks prior to that quit date.  Common risks including, but not limited to, continuing to smoke were reviewed including:  lung cancer, emphysema, chronic bronchitis, pneumonia, vascular disease, etc..  At this time the patient elects to contemplate quitting and contact this office for an appointment when ready or have additional questions.  Printed smoking cessation information handed to the patient.  An opportunity to ask questions was given, all questions answered, patient understands and elects to proceed. Patient referred to 1-800-QUIT-NOW  for additional resources.  Total time spent in counselin minutes       Orders:    CT Chest Lung Screening Low Dose; Future

## 2025-07-14 NOTE — ASSESSMENT & PLAN NOTE
We will assess A1c level today.  Continue home monitoring glucose target blood sugar less than 120 mg/dL, fasting.  Target A1c less than 8.0%.  Recommended dilated eye exam annually along with foot exam.    Orders:    Hemoglobin A1C; Future

## 2025-07-14 NOTE — ASSESSMENT & PLAN NOTE
Per historical review.  We will need aggressive control of HTN, HDL, blood sugar.  Encouraged weight loss, increase water intake.  We will reassess BUN/creatinine, urinary a/c ratio, EGFR next visit.

## 2025-07-22 ENCOUNTER — TELEPHONE (OUTPATIENT)
Dept: FAMILY MEDICINE | Facility: CLINIC | Age: 65
End: 2025-07-22
Payer: MEDICARE

## 2025-07-22 NOTE — TELEPHONE ENCOUNTER
Copied from CRM #[id number]. Topic: General Inquiry - Patient Advice  >> Jul 22, 2025  1:49 PM Sona wrote:  Type:  Needs Medical Advice    Who Called: tenzin from [facility]  Symptoms (please be specific): caller would like to  know if office received fax regarding a PA form, only second date has to be signed and dated    Would the patient rather a call back or a response via [facility]? call  Best Call Back Number:   Additional Information: please advise and thank  you

## 2025-08-20 DIAGNOSIS — N18.31 STAGE 3A CHRONIC KIDNEY DISEASE: ICD-10-CM

## 2025-08-21 ENCOUNTER — LAB VISIT (OUTPATIENT)
Dept: LAB | Facility: HOSPITAL | Age: 65
End: 2025-08-21
Attending: FAMILY MEDICINE
Payer: MEDICARE

## 2025-08-21 ENCOUNTER — OFFICE VISIT (OUTPATIENT)
Dept: FAMILY MEDICINE | Facility: CLINIC | Age: 65
End: 2025-08-21
Payer: MEDICARE

## 2025-08-21 VITALS
HEIGHT: 69 IN | WEIGHT: 248 LBS | OXYGEN SATURATION: 96 % | SYSTOLIC BLOOD PRESSURE: 112 MMHG | HEART RATE: 67 BPM | DIASTOLIC BLOOD PRESSURE: 64 MMHG | BODY MASS INDEX: 36.73 KG/M2

## 2025-08-21 DIAGNOSIS — N18.31 STAGE 3A CHRONIC KIDNEY DISEASE: ICD-10-CM

## 2025-08-21 DIAGNOSIS — L30.9 ECZEMA, UNSPECIFIED TYPE: Primary | ICD-10-CM

## 2025-08-21 LAB
ALBUMIN SERPL BCP-MCNC: 3.8 G/DL (ref 3.5–5.2)
ALP SERPL-CCNC: 92 UNIT/L (ref 40–150)
ALT SERPL W/O P-5'-P-CCNC: 19 UNIT/L (ref 10–44)
ANION GAP (OHS): 14 MMOL/L (ref 8–16)
AST SERPL-CCNC: 27 UNIT/L (ref 11–45)
BILIRUB SERPL-MCNC: 0.8 MG/DL (ref 0.1–1)
BUN SERPL-MCNC: 22 MG/DL (ref 8–23)
CALCIUM SERPL-MCNC: 9.2 MG/DL (ref 8.7–10.5)
CHLORIDE SERPL-SCNC: 109 MMOL/L (ref 95–110)
CO2 SERPL-SCNC: 23 MMOL/L (ref 23–29)
CREAT SERPL-MCNC: 1.4 MG/DL (ref 0.5–1.4)
GFR SERPLBLD CREATININE-BSD FMLA CKD-EPI: 56 ML/MIN/1.73/M2
GLUCOSE SERPL-MCNC: 95 MG/DL (ref 70–110)
POTASSIUM SERPL-SCNC: 4.2 MMOL/L (ref 3.5–5.1)
PROT SERPL-MCNC: 7 GM/DL (ref 6–8.4)
SODIUM SERPL-SCNC: 146 MMOL/L (ref 136–145)

## 2025-08-21 PROCEDURE — 82947 ASSAY GLUCOSE BLOOD QUANT: CPT

## 2025-08-21 PROCEDURE — 36415 COLL VENOUS BLD VENIPUNCTURE: CPT

## 2025-08-21 PROCEDURE — 99999 PR PBB SHADOW E&M-EST. PATIENT-LVL IV: CPT | Mod: PBBFAC,,, | Performed by: FAMILY MEDICINE

## 2025-08-21 RX ORDER — FLUOCINONIDE CREAM (EMULSIFIED BASE) 0.5 MG/G
CREAM TOPICAL
Qty: 60 G | Refills: 0 | Status: SHIPPED | OUTPATIENT
Start: 2025-08-21

## 2025-08-22 PROBLEM — L30.9 ECZEMA: Status: ACTIVE | Noted: 2025-08-22

## (undated) DEVICE — BANDAGE ESMARK ELASTIC ST 6X9

## (undated) DEVICE — PACK CUSTOM UNIV BASIN SLI

## (undated) DEVICE — SET DECANTER MEDICHOICE

## (undated) DEVICE — ADHESIVE MASTISOL VIAL 48/BX

## (undated) DEVICE — BANDAGE MATRIX HK LOOP 6IN 5YD

## (undated) DEVICE — SOL 9P NACL IRR PIC IL

## (undated) DEVICE — CATH URETHRAL 16FR RED

## (undated) DEVICE — PADDING WYTEX UNDRCST 6INX4YD

## (undated) DEVICE — PIN BONE 3.2X110MM
Type: IMPLANTABLE DEVICE | Site: KNEE | Status: NON-FUNCTIONAL
Removed: 2022-04-25

## (undated) DEVICE — LINER SUCTION 3000CC

## (undated) DEVICE — STRAP OR TABLE 5IN X 72IN

## (undated) DEVICE — BNDG COFLEX FOAM LF2 ST 6X5YD

## (undated) DEVICE — WRAP DEMAYO LEG STERILE

## (undated) DEVICE — SLEEVE SCD EXPRESS KNEE MEDIUM

## (undated) DEVICE — KIT DRAPE RIO ONE PIECE W/POCK

## (undated) DEVICE — KIT ENDOKIT EGD/COLON/ERCP

## (undated) DEVICE — INTERPULSE SET

## (undated) DEVICE — SEE MEDLINE ITEM 157131

## (undated) DEVICE — PIN BONE 3.2X140MM
Type: IMPLANTABLE DEVICE | Site: KNEE | Status: NON-FUNCTIONAL
Removed: 2022-04-25

## (undated) DEVICE — KIT TRIATHLON CR FEM PREP SZ4

## (undated) DEVICE — CUBE COLD THERAPY POLAR CARE

## (undated) DEVICE — ALCOHOL 70% ISOP RUBBING 4OZ

## (undated) DEVICE — KIT TRIATHLON TIBIAL SIZER

## (undated) DEVICE — GLOVE SURG ULTRA TOUCH 8.5

## (undated) DEVICE — SET CYSTO IRR DRP CHMBR 84IN

## (undated) DEVICE — NDL SPINAL 18GX3.5 SPINOCAN

## (undated) DEVICE — SYR 50CC LL

## (undated) DEVICE — MIXER BONE CEMENT

## (undated) DEVICE — SOL IRR NACL .9% 3000ML

## (undated) DEVICE — SEE MEDLINE ITEM 157166

## (undated) DEVICE — PACK LOWER EXTREMITY

## (undated) DEVICE — TOWEL OR DISP STRL BLUE 4/PK

## (undated) DEVICE — DRAPE STERI-DRAPE 1000 17X11IN

## (undated) DEVICE — CLOSURE SKIN STERI STRIP 1/2X4

## (undated) DEVICE — GLOVE SENSICARE PI ALOE 6.5

## (undated) DEVICE — PACK BASIC

## (undated) DEVICE — ELECTRODE REM PLYHSV RETURN 9

## (undated) DEVICE — KIT CHECKPOINT TIBIAL

## (undated) DEVICE — SUT MONOCRYL 3-0 PS-1

## (undated) DEVICE — TUBE SUCTION YANKAUER HI CAP

## (undated) DEVICE — BLADE MAKO STANDARD

## (undated) DEVICE — TAPE SILK 3IN

## (undated) DEVICE — TOURNIQUET SB QC DP 34X4IN

## (undated) DEVICE — SUT STRATAFIX PDS 1 CTX 18IN

## (undated) DEVICE — GOWN TOGA SYS PEELWY ZIP 2 XL

## (undated) DEVICE — SEE MEDLINE ITEM 107746

## (undated) DEVICE — SUT 2-0 VICRYL / SH (J417)

## (undated) DEVICE — BLADE SURG CARBON STEEL #10

## (undated) DEVICE — SUT FIBERWIRE 2 38 IN TAPER

## (undated) DEVICE — DRAPE INCISE IOBAN 2 23X17IN

## (undated) DEVICE — TOGA FLYTE PEEL AWAY XLARGE

## (undated) DEVICE — DRAPE STERI INSTRUMENT 1018

## (undated) DEVICE — SPONGE BULKEE II ABSRB 6X6.75

## (undated) DEVICE — KIT TRIATHLON CR TIB PREP SZ4

## (undated) DEVICE — MANIFOLD 4 PORT

## (undated) DEVICE — APPLICATOR CHLORAPREP ORN 26ML

## (undated) DEVICE — UNDERGLOVES BIOGEL PI SIZE 8.5

## (undated) DEVICE — KIT VIZADISC KNEE TRACKING

## (undated) DEVICE — BLADE SAW SGTL 21.2X85.5X1.2

## (undated) DEVICE — SUT STRATAFIX SPIRAL VIOLET